# Patient Record
Sex: MALE | Race: WHITE | URBAN - NONMETROPOLITAN AREA
[De-identification: names, ages, dates, MRNs, and addresses within clinical notes are randomized per-mention and may not be internally consistent; named-entity substitution may affect disease eponyms.]

---

## 2017-01-05 ENCOUNTER — OFFICE VISIT (OUTPATIENT)
Dept: FAMILY MEDICINE CLINIC | Age: 37
End: 2017-01-05

## 2017-01-05 ENCOUNTER — TELEPHONE (OUTPATIENT)
Dept: FAMILY MEDICINE CLINIC | Age: 37
End: 2017-01-05

## 2017-01-05 VITALS
DIASTOLIC BLOOD PRESSURE: 84 MMHG | WEIGHT: 191.6 LBS | HEART RATE: 104 BPM | SYSTOLIC BLOOD PRESSURE: 136 MMHG | OXYGEN SATURATION: 97 % | HEIGHT: 68 IN | BODY MASS INDEX: 29.04 KG/M2

## 2017-01-05 DIAGNOSIS — Q07.00 ARNOLD-CHIARI MALFORMATION (HCC): ICD-10-CM

## 2017-01-05 DIAGNOSIS — Z00.00 WELL ADULT EXAM: Primary | ICD-10-CM

## 2017-01-05 DIAGNOSIS — M91.11: ICD-10-CM

## 2017-01-05 PROCEDURE — 99395 PREV VISIT EST AGE 18-39: CPT | Performed by: FAMILY MEDICINE

## 2017-01-05 ASSESSMENT — PATIENT HEALTH QUESTIONNAIRE - PHQ9
1. LITTLE INTEREST OR PLEASURE IN DOING THINGS: 0
2. FEELING DOWN, DEPRESSED OR HOPELESS: 0
SUM OF ALL RESPONSES TO PHQ9 QUESTIONS 1 & 2: 0
SUM OF ALL RESPONSES TO PHQ QUESTIONS 1-9: 0

## 2017-01-05 ASSESSMENT — ENCOUNTER SYMPTOMS
CONSTIPATION: 0
SHORTNESS OF BREATH: 0
DIARRHEA: 0

## 2017-01-20 ENCOUNTER — TELEPHONE (OUTPATIENT)
Dept: FAMILY MEDICINE CLINIC | Age: 37
End: 2017-01-20

## 2017-03-28 DIAGNOSIS — E03.9 UNSPECIFIED HYPOTHYROIDISM: ICD-10-CM

## 2017-03-28 RX ORDER — LEVOTHYROXINE SODIUM 0.07 MG/1
75 TABLET ORAL DAILY
Qty: 30 TABLET | Refills: 3 | Status: SHIPPED | OUTPATIENT
Start: 2017-03-28 | End: 2017-04-13 | Stop reason: DRUGHIGH

## 2017-03-31 RX ORDER — MELOXICAM 15 MG/1
15 TABLET ORAL DAILY
Qty: 30 TABLET | Refills: 3 | Status: SHIPPED | OUTPATIENT
Start: 2017-03-31 | End: 2017-05-24 | Stop reason: SINTOL

## 2017-04-13 ENCOUNTER — OFFICE VISIT (OUTPATIENT)
Dept: FAMILY MEDICINE CLINIC | Age: 37
End: 2017-04-13

## 2017-04-13 VITALS
SYSTOLIC BLOOD PRESSURE: 140 MMHG | HEIGHT: 68 IN | WEIGHT: 199.2 LBS | HEART RATE: 98 BPM | DIASTOLIC BLOOD PRESSURE: 86 MMHG | BODY MASS INDEX: 30.19 KG/M2 | OXYGEN SATURATION: 97 %

## 2017-04-13 DIAGNOSIS — Q07.00 ARNOLD-CHIARI MALFORMATION (HCC): ICD-10-CM

## 2017-04-13 DIAGNOSIS — M91.11: ICD-10-CM

## 2017-04-13 DIAGNOSIS — E03.9 UNSPECIFIED HYPOTHYROIDISM: Primary | ICD-10-CM

## 2017-04-13 PROCEDURE — 99213 OFFICE O/P EST LOW 20 MIN: CPT | Performed by: FAMILY MEDICINE

## 2017-04-13 RX ORDER — LEVOTHYROXINE SODIUM 0.1 MG/1
100 TABLET ORAL DAILY
Qty: 30 TABLET | Refills: 0 | Status: SHIPPED | OUTPATIENT
Start: 2017-04-13 | End: 2017-05-24 | Stop reason: ALTCHOICE

## 2017-04-13 RX ORDER — LEVOTHYROXINE SODIUM 112 UG/1
112 TABLET ORAL DAILY
Qty: 30 TABLET | Refills: 3 | Status: SHIPPED | OUTPATIENT
Start: 2017-04-13 | End: 2018-01-07 | Stop reason: SDUPTHER

## 2017-04-13 ASSESSMENT — ENCOUNTER SYMPTOMS: SHORTNESS OF BREATH: 0

## 2017-05-22 ENCOUNTER — TELEPHONE (OUTPATIENT)
Dept: FAMILY MEDICINE CLINIC | Age: 37
End: 2017-05-22

## 2017-05-24 ENCOUNTER — TELEPHONE (OUTPATIENT)
Dept: FAMILY MEDICINE CLINIC | Age: 37
End: 2017-05-24

## 2017-05-25 ENCOUNTER — OFFICE VISIT (OUTPATIENT)
Dept: FAMILY MEDICINE CLINIC | Age: 37
End: 2017-05-25

## 2017-05-25 VITALS
HEART RATE: 89 BPM | DIASTOLIC BLOOD PRESSURE: 84 MMHG | WEIGHT: 199 LBS | OXYGEN SATURATION: 98 % | BODY MASS INDEX: 30.26 KG/M2 | SYSTOLIC BLOOD PRESSURE: 130 MMHG

## 2017-05-25 DIAGNOSIS — K21.00 GASTROESOPHAGEAL REFLUX DISEASE WITH ESOPHAGITIS: Primary | ICD-10-CM

## 2017-05-25 DIAGNOSIS — K92.0 HEMATEMESIS, PRESENCE OF NAUSEA NOT SPECIFIED: ICD-10-CM

## 2017-05-25 DIAGNOSIS — R13.10 DYSPHAGIA, UNSPECIFIED TYPE: ICD-10-CM

## 2017-05-25 DIAGNOSIS — M91.11: ICD-10-CM

## 2017-05-25 PROCEDURE — 99214 OFFICE O/P EST MOD 30 MIN: CPT | Performed by: FAMILY MEDICINE

## 2017-05-25 RX ORDER — DEXLANSOPRAZOLE 60 MG/1
60 CAPSULE, DELAYED RELEASE ORAL DAILY
Qty: 20 CAPSULE | Refills: 0 | COMMUNITY
Start: 2017-05-25 | End: 2018-01-25 | Stop reason: ALTCHOICE

## 2017-05-25 ASSESSMENT — ENCOUNTER SYMPTOMS
NAUSEA: 1
HEARTBURN: 1
ABDOMINAL PAIN: 0
TROUBLE SWALLOWING: 1
GLOBUS SENSATION: 1
VOMITING: 1

## 2017-06-06 ENCOUNTER — TELEPHONE (OUTPATIENT)
Dept: FAMILY MEDICINE CLINIC | Age: 37
End: 2017-06-06

## 2017-06-07 RX ORDER — RANITIDINE 150 MG/1
150 TABLET ORAL 2 TIMES DAILY
Qty: 60 TABLET | Refills: 3 | Status: SHIPPED | OUTPATIENT
Start: 2017-06-07 | End: 2018-01-25 | Stop reason: ALTCHOICE

## 2017-06-12 ENCOUNTER — HOSPITAL ENCOUNTER (OUTPATIENT)
Dept: OTHER | Age: 37
Discharge: HOME | End: 2017-06-12
Attending: INTERNAL MEDICINE | Admitting: INTERNAL MEDICINE

## 2017-06-12 VITALS
HEIGHT: 68 IN | WEIGHT: 176 LBS | OXYGEN SATURATION: 98 % | RESPIRATION RATE: 16 BRPM | BODY MASS INDEX: 26.67 KG/M2 | DIASTOLIC BLOOD PRESSURE: 73 MMHG | TEMPERATURE: 97.9 F | SYSTOLIC BLOOD PRESSURE: 137 MMHG | HEART RATE: 73 BPM

## 2017-06-12 RX ORDER — SODIUM CHLORIDE, SODIUM LACTATE, POTASSIUM CHLORIDE, CALCIUM CHLORIDE 600; 310; 30; 20 MG/100ML; MG/100ML; MG/100ML; MG/100ML
INJECTION, SOLUTION INTRAVENOUS CONTINUOUS
Status: DISCONTINUED | OUTPATIENT
Start: 2017-06-12 | End: 2017-06-13 | Stop reason: HOSPADM

## 2017-06-12 RX ADMIN — SODIUM CHLORIDE, SODIUM LACTATE, POTASSIUM CHLORIDE, CALCIUM CHLORIDE: 600; 310; 30; 20 INJECTION, SOLUTION INTRAVENOUS at 12:54

## 2017-06-12 ASSESSMENT — PAIN - FUNCTIONAL ASSESSMENT: PAIN_FUNCTIONAL_ASSESSMENT: 0-10

## 2017-10-03 ENCOUNTER — TELEPHONE (OUTPATIENT)
Dept: FAMILY MEDICINE CLINIC | Age: 37
End: 2017-10-03

## 2017-10-03 NOTE — TELEPHONE ENCOUNTER
Pt calling requesting something to break up sinus congestion, he had dental work so its extra irritating to him, also wanting amoxicillin, uses wmwu, sxs x3 days

## 2017-10-04 RX ORDER — AMOXICILLIN 500 MG/1
500 CAPSULE ORAL 3 TIMES DAILY
Qty: 30 CAPSULE | Refills: 0 | Status: SHIPPED | OUTPATIENT
Start: 2017-10-04 | End: 2017-10-14

## 2018-01-07 DIAGNOSIS — E03.9 HYPOTHYROIDISM: ICD-10-CM

## 2018-01-08 RX ORDER — LEVOTHYROXINE SODIUM 112 UG/1
TABLET ORAL
Qty: 30 TABLET | Refills: 3 | Status: SHIPPED | OUTPATIENT
Start: 2018-01-08 | End: 2018-01-26 | Stop reason: DRUGHIGH

## 2018-01-09 DIAGNOSIS — K21.00 GASTROESOPHAGEAL REFLUX DISEASE WITH ESOPHAGITIS: ICD-10-CM

## 2018-01-09 RX ORDER — DEXLANSOPRAZOLE 60 MG/1
60 CAPSULE, DELAYED RELEASE ORAL DAILY
Qty: 20 CAPSULE | Refills: 0 | Status: CANCELLED | OUTPATIENT
Start: 2018-01-09

## 2018-01-09 RX ORDER — PANTOPRAZOLE SODIUM 40 MG/1
40 TABLET, DELAYED RELEASE ORAL 2 TIMES DAILY
Qty: 60 TABLET | Refills: 3 | Status: SHIPPED | OUTPATIENT
Start: 2018-01-09 | End: 2018-01-25 | Stop reason: ALTCHOICE

## 2018-01-18 ENCOUNTER — TELEPHONE (OUTPATIENT)
Dept: FAMILY MEDICINE CLINIC | Age: 38
End: 2018-01-18

## 2018-01-18 PROBLEM — E03.9 HYPOTHYROIDISM: Status: ACTIVE | Noted: 2018-01-18

## 2018-01-18 NOTE — TELEPHONE ENCOUNTER
Attempted to contact patient on 1/18/2018. Result: left message on the patient's voicemail asking patient to return my call. Pre-Visit planning not completed.        Cora Mcnally  Patient Services Specialist

## 2018-01-25 ENCOUNTER — OFFICE VISIT (OUTPATIENT)
Dept: FAMILY MEDICINE CLINIC | Age: 38
End: 2018-01-25

## 2018-01-25 VITALS
SYSTOLIC BLOOD PRESSURE: 136 MMHG | BODY MASS INDEX: 30.49 KG/M2 | HEIGHT: 68 IN | HEART RATE: 96 BPM | DIASTOLIC BLOOD PRESSURE: 88 MMHG | OXYGEN SATURATION: 97 % | WEIGHT: 201.2 LBS

## 2018-01-25 DIAGNOSIS — E03.9 HYPOTHYROIDISM, UNSPECIFIED TYPE: ICD-10-CM

## 2018-01-25 DIAGNOSIS — K21.00 GASTROESOPHAGEAL REFLUX DISEASE WITH ESOPHAGITIS: Primary | ICD-10-CM

## 2018-01-25 PROCEDURE — 36415 COLL VENOUS BLD VENIPUNCTURE: CPT | Performed by: FAMILY MEDICINE

## 2018-01-25 PROCEDURE — 99214 OFFICE O/P EST MOD 30 MIN: CPT | Performed by: FAMILY MEDICINE

## 2018-01-25 RX ORDER — DEXLANSOPRAZOLE 60 MG/1
60 CAPSULE, DELAYED RELEASE ORAL DAILY
Qty: 15 CAPSULE | Refills: 0 | COMMUNITY
Start: 2018-01-25 | End: 2018-02-06 | Stop reason: ALTCHOICE

## 2018-01-25 ASSESSMENT — ENCOUNTER SYMPTOMS
DIARRHEA: 0
COUGH: 0
CONSTIPATION: 0
NAUSEA: 1
HEARTBURN: 1
SORE THROAT: 1

## 2018-01-26 LAB
T4 FREE: 1.1 NG/DL (ref 0.9–1.8)
TSH SERPL DL<=0.05 MIU/L-ACNC: 7.54 UIU/ML (ref 0.27–4.2)

## 2018-01-26 RX ORDER — LEVOTHYROXINE SODIUM 0.12 MG/1
125 TABLET ORAL DAILY
Qty: 30 TABLET | Refills: 2 | Status: SHIPPED | OUTPATIENT
Start: 2018-01-26 | End: 2018-07-09 | Stop reason: SDUPTHER

## 2018-02-06 ENCOUNTER — TELEPHONE (OUTPATIENT)
Dept: FAMILY MEDICINE CLINIC | Age: 38
End: 2018-02-06

## 2018-02-06 RX ORDER — PANTOPRAZOLE SODIUM 40 MG/1
40 TABLET, DELAYED RELEASE ORAL DAILY
Qty: 30 TABLET | Refills: 3 | Status: SHIPPED | OUTPATIENT
Start: 2018-02-06 | End: 2019-01-25 | Stop reason: SDUPTHER

## 2018-02-06 NOTE — TELEPHONE ENCOUNTER
Since patient started the Solavei, he has had S/E of cough,congestion,fever ie: cold symptoms. Those are all Dexilant side effects. He is wanting to switch back to the Protonix and see if his sxs subside. He knows the Protonix wasn't working that well, but he doesn't want the S/E that he's having either b/c he has to work.     /MARCOS

## 2018-06-04 ENCOUNTER — TELEPHONE (OUTPATIENT)
Dept: FAMILY MEDICINE CLINIC | Age: 38
End: 2018-06-04

## 2018-06-04 RX ORDER — BENZONATATE 200 MG/1
200 CAPSULE ORAL 3 TIMES DAILY PRN
Qty: 30 CAPSULE | Refills: 0 | Status: SHIPPED | OUTPATIENT
Start: 2018-06-04 | End: 2019-01-25

## 2018-06-04 RX ORDER — AMOXICILLIN AND CLAVULANATE POTASSIUM 875; 125 MG/1; MG/1
1 TABLET, FILM COATED ORAL 2 TIMES DAILY
Qty: 20 TABLET | Refills: 0 | Status: SHIPPED | OUTPATIENT
Start: 2018-06-04 | End: 2018-06-06

## 2018-06-06 ENCOUNTER — TELEPHONE (OUTPATIENT)
Dept: FAMILY MEDICINE CLINIC | Age: 38
End: 2018-06-06

## 2018-06-06 RX ORDER — METHYLPREDNISOLONE 4 MG/1
TABLET ORAL
Qty: 1 KIT | Refills: 0 | Status: SHIPPED | OUTPATIENT
Start: 2018-06-06 | End: 2018-06-12

## 2018-07-09 RX ORDER — LEVOTHYROXINE SODIUM 0.12 MG/1
TABLET ORAL
Qty: 30 TABLET | Refills: 2 | Status: SHIPPED | OUTPATIENT
Start: 2018-07-09 | End: 2018-10-08 | Stop reason: SDUPTHER

## 2018-09-04 ENCOUNTER — TELEPHONE (OUTPATIENT)
Dept: FAMILY MEDICINE CLINIC | Age: 38
End: 2018-09-04

## 2018-09-04 RX ORDER — RANITIDINE 150 MG/1
150 TABLET ORAL 2 TIMES DAILY
Qty: 60 TABLET | Refills: 1 | Status: SHIPPED | OUTPATIENT
Start: 2018-09-04 | End: 2019-01-25 | Stop reason: ALTCHOICE

## 2018-10-08 RX ORDER — LEVOTHYROXINE SODIUM 0.12 MG/1
125 TABLET ORAL DAILY
Qty: 90 TABLET | Refills: 2 | Status: SHIPPED | OUTPATIENT
Start: 2018-10-08 | End: 2019-01-25 | Stop reason: SDUPTHER

## 2019-01-25 ENCOUNTER — OFFICE VISIT (OUTPATIENT)
Dept: FAMILY MEDICINE CLINIC | Age: 39
End: 2019-01-25
Payer: COMMERCIAL

## 2019-01-25 VITALS
SYSTOLIC BLOOD PRESSURE: 132 MMHG | OXYGEN SATURATION: 98 % | WEIGHT: 192.4 LBS | DIASTOLIC BLOOD PRESSURE: 86 MMHG | BODY MASS INDEX: 29.25 KG/M2 | HEART RATE: 103 BPM

## 2019-01-25 DIAGNOSIS — M91.11 JUVENILE OSTEOCHONDROSIS OF HEAD OF RIGHT FEMUR: ICD-10-CM

## 2019-01-25 DIAGNOSIS — E03.9 HYPOTHYROIDISM, UNSPECIFIED TYPE: ICD-10-CM

## 2019-01-25 DIAGNOSIS — M25.551 RIGHT HIP PAIN: Primary | ICD-10-CM

## 2019-01-25 DIAGNOSIS — K21.9 GASTROESOPHAGEAL REFLUX DISEASE WITHOUT ESOPHAGITIS: ICD-10-CM

## 2019-01-25 DIAGNOSIS — M25.551 PAIN OF RIGHT HIP JOINT: Primary | ICD-10-CM

## 2019-01-25 PROCEDURE — 99214 OFFICE O/P EST MOD 30 MIN: CPT | Performed by: NURSE PRACTITIONER

## 2019-01-25 RX ORDER — PANTOPRAZOLE SODIUM 40 MG/1
40 TABLET, DELAYED RELEASE ORAL DAILY
Qty: 30 TABLET | Refills: 3 | Status: SHIPPED | OUTPATIENT
Start: 2019-01-25

## 2019-01-25 RX ORDER — LEVOTHYROXINE SODIUM 0.12 MG/1
125 TABLET ORAL DAILY
Qty: 90 TABLET | Refills: 2 | Status: SHIPPED | OUTPATIENT
Start: 2019-01-25 | End: 2019-04-25

## 2019-01-25 ASSESSMENT — ENCOUNTER SYMPTOMS
STRIDOR: 0
CHOKING: 0
ABDOMINAL PAIN: 0
EYE REDNESS: 0
DIARRHEA: 0
WHEEZING: 0
EYE PAIN: 0
CHEST TIGHTNESS: 0
BACK PAIN: 0
VOMITING: 0
EYE DISCHARGE: 0
SORE THROAT: 0
EYE ITCHING: 0
SINUS PRESSURE: 0
NAUSEA: 0
RHINORRHEA: 0
COLOR CHANGE: 0
SHORTNESS OF BREATH: 0
VOICE CHANGE: 0
CONSTIPATION: 0
TROUBLE SWALLOWING: 0
PHOTOPHOBIA: 0
SINUS PAIN: 0
BLOOD IN STOOL: 0
COUGH: 0

## 2019-01-25 ASSESSMENT — PATIENT HEALTH QUESTIONNAIRE - PHQ9
1. LITTLE INTEREST OR PLEASURE IN DOING THINGS: 0
2. FEELING DOWN, DEPRESSED OR HOPELESS: 1
SUM OF ALL RESPONSES TO PHQ QUESTIONS 1-9: 1
SUM OF ALL RESPONSES TO PHQ QUESTIONS 1-9: 1
SUM OF ALL RESPONSES TO PHQ9 QUESTIONS 1 & 2: 1

## 2019-01-28 ENCOUNTER — TRANSCRIBE ORDERS (OUTPATIENT)
Dept: ADMINISTRATIVE | Facility: HOSPITAL | Age: 39
End: 2019-01-28

## 2019-01-28 ENCOUNTER — HOSPITAL ENCOUNTER (OUTPATIENT)
Dept: GENERAL RADIOLOGY | Facility: HOSPITAL | Age: 39
Discharge: HOME | End: 2019-01-28
Admitting: NURSE PRACTITIONER

## 2019-01-28 DIAGNOSIS — Z86.79 PERSONAL HISTORY OF CARDIOVASCULAR DISORDER: Primary | ICD-10-CM

## 2019-01-28 DIAGNOSIS — Z86.79 PERSONAL HISTORY OF CARDIOVASCULAR DISORDER: ICD-10-CM

## 2019-01-28 PROCEDURE — 71046 X-RAY EXAM CHEST 2 VIEWS: CPT

## 2019-01-28 PROCEDURE — 71046 X-RAY EXAM CHEST 2 VIEWS: CPT | Performed by: RADIOLOGY

## 2019-02-01 ENCOUNTER — OFFICE VISIT (OUTPATIENT)
Dept: CARDIOLOGY | Facility: CLINIC | Age: 39
End: 2019-02-01

## 2019-02-01 VITALS
DIASTOLIC BLOOD PRESSURE: 80 MMHG | SYSTOLIC BLOOD PRESSURE: 113 MMHG | HEART RATE: 87 BPM | BODY MASS INDEX: 29.25 KG/M2 | RESPIRATION RATE: 16 BRPM | HEIGHT: 68 IN | WEIGHT: 193 LBS

## 2019-02-01 DIAGNOSIS — Z01.810 PREOPERATIVE CARDIOVASCULAR EXAMINATION: Primary | ICD-10-CM

## 2019-02-01 PROCEDURE — 99204 OFFICE O/P NEW MOD 45 MIN: CPT | Performed by: INTERNAL MEDICINE

## 2019-02-01 PROCEDURE — 93000 ELECTROCARDIOGRAM COMPLETE: CPT | Performed by: INTERNAL MEDICINE

## 2019-02-01 RX ORDER — IBUPROFEN 600 MG/1
600 TABLET ORAL EVERY 6 HOURS PRN
COMMUNITY
End: 2020-08-03

## 2019-02-01 RX ORDER — CETIRIZINE HYDROCHLORIDE 10 MG/1
10 TABLET ORAL DAILY
COMMUNITY
End: 2020-06-25 | Stop reason: ALTCHOICE

## 2019-02-01 RX ORDER — PANTOPRAZOLE SODIUM 20 MG/1
40 TABLET, DELAYED RELEASE ORAL DAILY
COMMUNITY
End: 2020-09-04 | Stop reason: SDUPTHER

## 2019-02-01 RX ORDER — LEVOTHYROXINE SODIUM 0.12 MG/1
125 TABLET ORAL DAILY
COMMUNITY
End: 2020-09-04 | Stop reason: SDUPTHER

## 2019-02-01 NOTE — PROGRESS NOTES
Jailyn Conroy, EMMETT  Nick Christian  1980 02/01/2019    Patient Active Problem List   Diagnosis   • Preoperative cardiovascular examination       Dear Jailyn Conroy, EMMETT:    Siomara Christian is a 38 y.o. male with the problems as listed above, presents    Chief complaint: Preoperative cardiovascular evaluation.    History of Present Illness: Mr. Crockett is a pleasant 38-year-old  male with known history of known coronary artery disease but has a remote history of heart failure in 2008 after viral illness.  At that time apparently he developed some cardiomyopathy which reportedly has improved and resolved over a period of time.  He has not had any more episodes of heart failure in the last few years.  He is now referred for preoperative cardiac evaluation and cardiac clearance prior to undergoing hip surgery.  He has history of Perthes disease.  He denies any recent history of chest pain or discomfort or shortness of breath, PND or orthopnea.  He denies any palpitations, dizziness or syncope.    Allergies   Allergen Reactions   • Morphine Mental Status Change   :      Current Outpatient Medications:   •  cetirizine (zyrTEC) 10 MG tablet, Take 10 mg by mouth Daily., Disp: , Rfl:   •  ibuprofen (ADVIL,MOTRIN) 600 MG tablet, Take 600 mg by mouth Every 6 (Six) Hours As Needed for Mild Pain ., Disp: , Rfl:   •  levothyroxine (SYNTHROID, LEVOTHROID) 125 MCG tablet, Take 125 mcg by mouth Daily., Disp: , Rfl:   •  pantoprazole (PROTONIX) 20 MG EC tablet, Take 40 mg by mouth Daily., Disp: , Rfl:     Past Medical History:   Diagnosis Date   • Arnold-Chiari deformity (CMS/HCC)    • Coronary artery disease    • Perthes disease    • Sleep apnea    • Thyroid disease      Past Surgical History:   Procedure Laterality Date   • GALLBLADDER SURGERY     • HERNIA REPAIR       Family History   Problem Relation Age of Onset   • Heart disease Maternal Grandfather    • Heart disease Paternal Grandfather   "    Social History     Tobacco Use   • Smoking status: Former Smoker     Packs/day: 1.00     Types: Cigarettes     Last attempt to quit: 2015     Years since quittin.0   • Smokeless tobacco: Former User     Types: Chew   Substance Use Topics   • Alcohol use: Yes     Comment: occas   • Drug use: No       Review of Systems   Constitution: Negative for chills, diaphoresis and fever.   Cardiovascular: Negative for chest pain, leg swelling, orthopnea, palpitations and paroxysmal nocturnal dyspnea.   Respiratory: Negative for cough, hemoptysis and shortness of breath.    Endocrine: Negative for cold intolerance and heat intolerance.   Hematologic/Lymphatic: Does not bruise/bleed easily.   Skin: Positive for dry skin and rash.   Musculoskeletal: Positive for back pain, joint pain, joint swelling, muscle cramps, muscle weakness and stiffness. Negative for myalgias.   Gastrointestinal: Negative for abdominal pain, constipation, diarrhea, nausea and vomiting.   Genitourinary: Negative for dysuria and hematuria.   Neurological: Positive for headaches, numbness and paresthesias. Negative for dizziness and focal weakness.        Stroke   Psychiatric/Behavioral: Positive for memory loss.       Objective   Blood pressure 113/80, pulse 87, resp. rate 16, height 172.7 cm (68\"), weight 87.5 kg (193 lb).  Body mass index is 29.35 kg/m².        Physical Exam   Constitutional: He is oriented to person, place, and time. He appears well-developed and well-nourished.   HENT:   Mouth/Throat: Oropharynx is clear and moist.   Eyes: EOM are normal. Pupils are equal, round, and reactive to light.   Neck: Neck supple. No JVD present. No tracheal deviation present. No thyromegaly present.   Cardiovascular: Normal rate, regular rhythm, S1 normal and S2 normal. Exam reveals no gallop and no friction rub.   No murmur heard.  Pulmonary/Chest: Effort normal and breath sounds normal.   Abdominal: Soft. Bowel sounds are normal. He exhibits no mass. " There is no tenderness.   Musculoskeletal: Normal range of motion. He exhibits no edema.   Has painful right hip movements.   Lymphadenopathy:     He has no cervical adenopathy.   Neurological: He is alert and oriented to person, place, and time.   Skin: Skin is warm and dry. No rash noted.   Psychiatric: He has a normal mood and affect.         ECG 12 Lead  Date/Time: 2/1/2019 12:13 PM  Performed by: Darrin Reyes MD  Authorized by: Darrin Reyes MD   Rhythm: sinus rhythm  BPM: 93  Conduction: conduction normal  ST Segments: ST segments normal  Other: no other findings  Clinical impression: normal ECG              Assessment/Plan :  1. Preoperative cardiovascular examination          Recommendations:  Since his cardiac status appears to be stable at this time, we'll go ahead and clear him for the hip surgery with an acceptable cardiac risk.    No Follow-up on file.    As always, Jailyn Conroy APRN  I appreciate very much the opportunity to participate in the cardiovascular care of your patients. Please do not hesitate to call me with any questions with regards to Nick Grooms evaluation and management.       With Best Regards,        Darrin Reyes MD, [facility]    Dragon disclaimer:  Much of this encounter note is an electronic transcription/translation of spoken language to printed text. The electronic translation of spoken language may permit erroneous, or at times, nonsensical words or phrases to be inadvertently transcribed; Although I have reviewed the note for such errors, some may still exist.

## 2019-02-13 ENCOUNTER — OFFICE VISIT (OUTPATIENT)
Dept: ORTHOPEDIC SURGERY | Facility: CLINIC | Age: 39
End: 2019-02-13

## 2019-02-13 VITALS
HEIGHT: 68 IN | WEIGHT: 191 LBS | HEART RATE: 91 BPM | DIASTOLIC BLOOD PRESSURE: 77 MMHG | SYSTOLIC BLOOD PRESSURE: 124 MMHG | BODY MASS INDEX: 28.95 KG/M2

## 2019-02-13 DIAGNOSIS — M91.11 LEGG-PERTHES DISEASE, RIGHT: Primary | ICD-10-CM

## 2019-02-13 DIAGNOSIS — M70.61 TROCHANTERIC BURSITIS OF RIGHT HIP: ICD-10-CM

## 2019-02-13 PROCEDURE — 20610 DRAIN/INJ JOINT/BURSA W/O US: CPT | Performed by: ORTHOPAEDIC SURGERY

## 2019-02-13 PROCEDURE — 99203 OFFICE O/P NEW LOW 30 MIN: CPT | Performed by: ORTHOPAEDIC SURGERY

## 2019-02-13 RX ADMIN — LIDOCAINE HYDROCHLORIDE 2 ML: 20 INJECTION, SOLUTION INFILTRATION; PERINEURAL at 12:00

## 2019-02-13 RX ADMIN — METHYLPREDNISOLONE ACETATE 40 MG: 40 INJECTION, SUSPENSION INTRA-ARTICULAR; INTRALESIONAL; INTRAMUSCULAR; SOFT TISSUE at 12:00

## 2019-02-13 NOTE — PROGRESS NOTES
New Patient Visit      Patient: Nick Christian  YOB: 1980  Date of Encounter: 02/13/2019        Chief Complaint:   Chief Complaint   Patient presents with   • Right Hip - Pain       HPI:   Nick Christian, 38 y.o. male, referred by Jailyn Conroy APRN presents today for evaluation of incapacitating right hip pain.  He has history of Perthes disease that he was not aware of untill his mid 20s.  He did wear a built-up shoe but discontinued this because it was too high.  He moved to [place] and he was employed at the [employer].  After working 3 months he developed significant limp and has progressed to the point that he is unable to sit or lie without significant pain.  As a [employer] employee, he was initially referred to [facility].  He was unable to make the trip for whatever reason and then his insurance was terminated.  He then visited a surgeon in [facility] and was scheduled for right total hip replacement.  He reports that he did not make that trip because he cannot stand to ride in a car that long.  He denies significant back pain, denies weakness or numbness in his leg.  He localizes pain to the posterior and lateral aspects of his hip and acknowledges very little pain in his groin.    Active Problem List:  Patient Active Problem List   Diagnosis   • Arnold-Chiari malformation (CMS/HCC)   • Hypothyroidism   • Juvenile osteochondrosis of head of femur   • Legg-Perthes disease, right       Past Medical History:  Past Medical History:   Diagnosis Date   • Arnold-Chiari deformity (CMS/HCC)    • Coronary artery disease    • Perthes disease    • Sleep apnea    • Thyroid disease        Past Surgical History:  Past Surgical History:   Procedure Laterality Date   • GALLBLADDER SURGERY     • HERNIA REPAIR         Family History:  Family History   Problem Relation Age of Onset   • Heart disease Maternal Grandfather    • Diabetes Maternal Grandfather    • Heart disease Paternal  Grandfather    • Rheumatologic disease Mother    • Diabetes Mother    • Hypertension Mother    • Diabetes Father    • Cancer Father    • Hypertension Maternal Grandmother        Social History:  Social History     Socioeconomic History   • Marital status: Single     Spouse name: Not on file   • Number of children: Not on file   • Years of education: Not on file   • Highest education level: Not on file   Social Needs   • Financial resource strain: Not on file   • Food insecurity - worry: Not on file   • Food insecurity - inability: Not on file   • Transportation needs - medical: Not on file   • Transportation needs - non-medical: Not on file   Occupational History   • Not on file   Tobacco Use   • Smoking status: Former Smoker     Packs/day: 1.00     Types: Cigarettes     Last attempt to quit: 2015     Years since quittin.1   • Smokeless tobacco: Former User     Types: Chew   Substance and Sexual Activity   • Alcohol use: Yes     Comment: occas   • Drug use: No   • Sexual activity: Defer   Other Topics Concern   • Not on file   Social History Narrative   • Not on file     Patient's Body mass index is 29.04 kg/m². BMI is above normal parameters. Recommendations include: educational material.      Medications:  Current Outpatient Medications   Medication Sig Dispense Refill   • cetirizine (zyrTEC) 10 MG tablet Take 10 mg by mouth Daily.     • ibuprofen (ADVIL,MOTRIN) 600 MG tablet Take 600 mg by mouth Every 6 (Six) Hours As Needed for Mild Pain .     • levothyroxine (SYNTHROID, LEVOTHROID) 125 MCG tablet Take 125 mcg by mouth Daily.     • pantoprazole (PROTONIX) 20 MG EC tablet Take 40 mg by mouth Daily.       No current facility-administered medications for this visit.        Allergies:  Allergies   Allergen Reactions   • Morphine Mental Status Change       Review of Systems:   Review of Systems   Constitutional: Positive for activity change and fatigue.   HENT: Positive for hearing loss and sinus pain.    Eyes:  "Negative.    Respiratory: Positive for apnea.    Cardiovascular: Negative.    Gastrointestinal: Negative.    Endocrine: Negative.    Genitourinary: Negative.    Musculoskeletal: Positive for arthralgias, back pain, joint swelling and myalgias.   Skin: Negative.    Allergic/Immunologic: Negative.    Neurological: Positive for numbness.   Hematological: Negative.    Psychiatric/Behavioral: Negative.        Physical Exam:   Physical Exam  GENERAL: 38 y.o. male, alert and oriented X 3 in no acute distress.   Visit Vitals  /77   Pulse 91   Ht 172.7 cm (68\")   Wt 86.6 kg (191 lb)   BMI 29.04 kg/m²     Musculoskeletal:   Right hip evaluation is very difficult due to disease. He sits with his hip flexed and lies on his left side, complains of significant pain with attempted motion.  Upon palpation he has exquisite tenderness over the greater trochanteric bursal region.  Neurovascular is intact to his right leg.     Radiology/Labs:     Radiographs reviewed of right hip show typical flattening of the femoral head with Perthes disease.  No other abnormality noted.  He also had available x-rays of his lumbar spine and those were reviewed and negative.    Assessment & Plan:   38 y.o. male presents with nearly incapacitating pain right hip that he localizes posterior and lateral right hip.  His presentation is somewhat consistent with bursitis.  Today he was given Depo-Medrol 40 mg lidocaine block directly over the right greater trochanteric bursa he reported very little relief.  With further questioning he states that his surgeon in [place] also treated him with steroid injection which did not help.  He is very interested in having his right hip replaced was initially referred to [facility] orthopedic [facility].  He reports there was a financial situation he was unable to attend but he now has Medicaid.  He was offered referral to the [facility].  He states that he would prefer to return to the [facility].  He is referred to Dr. Vásquez' for possible right hip replacement with Perthes disease.  We will follow-up here as needed.      ICD-10-CM ICD-9-CM   1. Legg-Perthes disease, right M91.11 732.1     Large Joint Arthrocentesis: R greater trochanteric bursa  Date/Time: 2/13/2019 12:00 PM  Consent given by: patient  Site marked: site marked  Supporting Documentation  Indications: pain   Procedure Details  Location: hip - R greater trochanteric bursa  Preparation: Patient was prepped and draped in the usual sterile fashion  Needle size: 25 G  Approach: lateral  Medications administered: 40 mg methylPREDNISolone acetate 40 MG/ML; 2 mL lidocaine 2%  Patient tolerance: patient tolerated the procedure well with no immediate complications                  Cc:   Jailyn Conroy APRN          Scribed for Rahat Salazar MD by Hazel Salazar RN.9:27 AM 02/13/2019

## 2019-02-15 PROBLEM — Z01.810 PREOPERATIVE CARDIOVASCULAR EXAMINATION: Status: RESOLVED | Noted: 2019-02-01 | Resolved: 2019-02-15

## 2019-02-15 PROBLEM — E03.9 HYPOTHYROIDISM: Status: ACTIVE | Noted: 2018-01-18

## 2019-02-15 PROBLEM — Q07.00 ARNOLD-CHIARI MALFORMATION: Status: ACTIVE | Noted: 2019-02-15

## 2019-02-15 PROBLEM — M91.10 JUVENILE OSTEOCHONDROSIS OF HEAD OF FEMUR: Status: ACTIVE | Noted: 2019-02-15

## 2019-02-19 ENCOUNTER — TELEPHONE (OUTPATIENT)
Dept: ORTHOPEDIC SURGERY | Facility: CLINIC | Age: 39
End: 2019-02-19

## 2019-02-19 NOTE — TELEPHONE ENCOUNTER
Patient was notified concerning referral he requested to Dr. Vásquez in [place], he states that he has already been taken care of and scheduled for surgery @ [facility] in [place].   Dr. Vásquez's office called and appointment cancelled 2-19-19.

## 2019-02-20 RX ORDER — METHYLPREDNISOLONE ACETATE 40 MG/ML
40 INJECTION, SUSPENSION INTRA-ARTICULAR; INTRALESIONAL; INTRAMUSCULAR; SOFT TISSUE
Status: COMPLETED | OUTPATIENT
Start: 2019-02-13 | End: 2019-02-13

## 2019-02-20 RX ORDER — LIDOCAINE HYDROCHLORIDE 20 MG/ML
2 INJECTION, SOLUTION INFILTRATION; PERINEURAL
Status: COMPLETED | OUTPATIENT
Start: 2019-02-13 | End: 2019-02-13

## 2020-04-07 ENCOUNTER — TRANSCRIBE ORDERS (OUTPATIENT)
Dept: ADMINISTRATIVE | Facility: HOSPITAL | Age: 40
End: 2020-04-07

## 2020-04-07 DIAGNOSIS — M25.551 PAIN OF RIGHT HIP JOINT: Primary | ICD-10-CM

## 2020-05-07 ENCOUNTER — HOSPITAL ENCOUNTER (OUTPATIENT)
Dept: NUCLEAR MEDICINE | Facility: HOSPITAL | Age: 40
Discharge: HOME | End: 2020-05-07

## 2020-05-07 DIAGNOSIS — M25.551 PAIN OF RIGHT HIP JOINT: ICD-10-CM

## 2020-05-07 PROCEDURE — 0 TECHNETIUM OXIDRONATE KIT: Performed by: ORTHOPAEDIC SURGERY

## 2020-05-07 PROCEDURE — A9561 TC99M OXIDRONATE: HCPCS | Performed by: ORTHOPAEDIC SURGERY

## 2020-05-07 PROCEDURE — 78315 BONE IMAGING 3 PHASE: CPT | Performed by: RADIOLOGY

## 2020-05-07 PROCEDURE — 78315 BONE IMAGING 3 PHASE: CPT

## 2020-05-07 RX ADMIN — TECHNETIUM TC 99M OXIDRONATE 1 DOSE: 3.15 INJECTION, POWDER, LYOPHILIZED, FOR SOLUTION INTRAVENOUS at 08:53

## 2020-06-22 ENCOUNTER — TRANSCRIBE ORDERS (OUTPATIENT)
Dept: ADMINISTRATIVE | Facility: HOSPITAL | Age: 40
End: 2020-06-22

## 2020-06-22 DIAGNOSIS — Q07.00 ARNOLD-CHIARI SYNDROME (HCC): Primary | ICD-10-CM

## 2020-06-24 ENCOUNTER — HOSPITAL ENCOUNTER (OUTPATIENT)
Dept: MRI IMAGING | Facility: HOSPITAL | Age: 40
Discharge: HOME | End: 2020-06-24
Admitting: FAMILY MEDICINE

## 2020-06-24 DIAGNOSIS — Q07.00 ARNOLD-CHIARI SYNDROME (HCC): ICD-10-CM

## 2020-06-24 PROCEDURE — 70551 MRI BRAIN STEM W/O DYE: CPT

## 2020-06-24 PROCEDURE — 70551 MRI BRAIN STEM W/O DYE: CPT | Performed by: RADIOLOGY

## 2020-06-25 ENCOUNTER — OFFICE VISIT (OUTPATIENT)
Dept: CARDIOLOGY | Facility: CLINIC | Age: 40
End: 2020-06-25

## 2020-06-25 VITALS
HEART RATE: 103 BPM | WEIGHT: 202.8 LBS | HEIGHT: 69 IN | SYSTOLIC BLOOD PRESSURE: 124 MMHG | DIASTOLIC BLOOD PRESSURE: 80 MMHG | BODY MASS INDEX: 30.04 KG/M2 | OXYGEN SATURATION: 98 % | TEMPERATURE: 99 F

## 2020-06-25 DIAGNOSIS — R06.09 DYSPNEA ON EXERTION: Primary | ICD-10-CM

## 2020-06-25 PROCEDURE — 93000 ELECTROCARDIOGRAM COMPLETE: CPT | Performed by: INTERNAL MEDICINE

## 2020-06-25 PROCEDURE — 99213 OFFICE O/P EST LOW 20 MIN: CPT | Performed by: INTERNAL MEDICINE

## 2020-06-25 RX ORDER — FENOFIBRATE 145 MG/1
145 TABLET, COATED ORAL DAILY
COMMUNITY
End: 2020-08-20

## 2020-06-25 NOTE — PROGRESS NOTES
Nita Goss DO  Nick Christian  1980 06/25/2020    Patient Active Problem List   Diagnosis   • Arnold-Chiari malformation (CMS/HCC)   • Hypothyroidism   • Juvenile osteochondrosis of head of femur   • Legg-Perthes disease, right       Dear Nita Goss DO:    Siomara Christian is a 40 y.o. male with the problems as listed above, presents    Chief Complaint: Shortness of breath for the last several months     History of Present Illness:  is a pleasant 40-year-old  male with a no history of known heart disease or coronary artery disease, nonhypertensive, nondiabetic, non-smoker, presents with complaints of mainly having shortness of breath over the last several months.  He says he gets short of breath sometimes just sitting down and sometimes with mild exertion.  He denies any PND, orthopnea pedal edema.  Denies any chest pains.  Denies any history of lung problems.  He also states that he was recently diagnosed to have hypertriglyceridemia.  I do not have the pertinent lipid panel report.      Allergies   Allergen Reactions   • Morphine Mental Status Change   :      Current Outpatient Medications:   •  fenofibrate (TRICOR) 145 MG tablet, Take 145 mg by mouth Daily., Disp: , Rfl:   •  ibuprofen (ADVIL,MOTRIN) 600 MG tablet, Take 600 mg by mouth Every 6 (Six) Hours As Needed for Mild Pain ., Disp: , Rfl:   •  levothyroxine (SYNTHROID, LEVOTHROID) 125 MCG tablet, Take 125 mcg by mouth Daily., Disp: , Rfl:   •  pantoprazole (PROTONIX) 20 MG EC tablet, Take 40 mg by mouth Daily., Disp: , Rfl:       The following portions of the patient's history were reviewed and updated as appropriate: allergies, current medications, past family history, past medical history, past social history, past surgical history and problem list.    Social History     Tobacco Use   • Smoking status: Former Smoker     Packs/day: 1.00     Types: Cigarettes     Last attempt to quit: 2015     Years since  "quittin.4   • Smokeless tobacco: Former User     Types: Chew   Substance Use Topics   • Alcohol use: Yes     Comment: occas   • Drug use: No       Review of Systems   Cardiovascular: Negative for chest pain, leg swelling and syncope.   Respiratory: Negative for shortness of breath.    Neurological: Negative for dizziness.       Objective   Vitals:    20 1305   BP: 124/80   BP Location: Right arm   Patient Position: Sitting   Cuff Size: Adult   Pulse: 103   Temp: 99 °F (37.2 °C)   TempSrc: Infrared   SpO2: 98%   Weight: 92 kg (202 lb 12.8 oz)   Height: 175.3 cm (69\")     Body mass index is 29.95 kg/m².        Physical Exam   Constitutional: He is oriented to person, place, and time. He appears well-developed and well-nourished.   HENT:   Mouth/Throat: Oropharynx is clear and moist.   Eyes: Pupils are equal, round, and reactive to light. EOM are normal.   Neck: Neck supple. No JVD present. No tracheal deviation present. No thyromegaly present.   Cardiovascular: Normal rate, regular rhythm, S1 normal and S2 normal. Exam reveals no gallop and no friction rub.   No murmur heard.  Pulmonary/Chest: Effort normal and breath sounds normal.   Abdominal: Soft. Bowel sounds are normal. He exhibits no mass. There is no tenderness.   Musculoskeletal: Normal range of motion. He exhibits no edema.   Lymphadenopathy:     He has no cervical adenopathy.   Neurological: He is alert and oriented to person, place, and time.   Skin: Skin is warm and dry. No rash noted.   Psychiatric: He has a normal mood and affect.         ECG 12 Lead  Date/Time: 2020 12:58 PM  Performed by: Darrin Reyes MD  Authorized by: Darrin Reyes MD   Comparison: compared with previous ECG from 2019  Similar to previous ECG  Rhythm: sinus rhythm  BPM: 90  Conduction: conduction normal  ST Segments: ST segments normal  T Waves: T waves normal    Clinical impression: normal ECG              Assessment/Plan :   Diagnosis Plan    Dyspnea on " exertion (NYHA class III) Adult Transthoracic Echo Complete       Recommendations:  We will obtain echo Doppler study to evaluate his LV function valve function and tailor further therapy accordingly.  We will get his recent lipid panel report from his PCPs office.    Return in about 5 weeks (around 7/30/2020).    As always, Dr. Goss  I appreciate very much the opportunity to participate in the cardiovascular care of your patients. Please do not hesitate to call me with any questions with regards to Nick Christian's evaluation and management.     With Best Regards,        Darrin Reyes MD, FACC    Please note that portions of this note were completed with a voice recognition program.

## 2020-06-26 ENCOUNTER — TELEPHONE (OUTPATIENT)
Dept: CARDIOLOGY | Facility: CLINIC | Age: 40
End: 2020-06-26

## 2020-07-07 ENCOUNTER — APPOINTMENT (OUTPATIENT)
Dept: CARDIOLOGY | Facility: HOSPITAL | Age: 40
End: 2020-07-07

## 2020-07-10 ENCOUNTER — TELEPHONE (OUTPATIENT)
Dept: FAMILY MEDICINE CLINIC | Age: 40
End: 2020-07-10

## 2020-07-15 ENCOUNTER — TRANSCRIBE ORDERS (OUTPATIENT)
Dept: ADMINISTRATIVE | Facility: HOSPITAL | Age: 40
End: 2020-07-15

## 2020-07-15 DIAGNOSIS — M25.551 RIGHT HIP PAIN: Primary | ICD-10-CM

## 2020-07-17 ENCOUNTER — HOSPITAL ENCOUNTER (OUTPATIENT)
Dept: CT IMAGING | Facility: HOSPITAL | Age: 40
Discharge: HOME | End: 2020-07-17
Admitting: ORTHOPAEDIC SURGERY

## 2020-07-17 DIAGNOSIS — M25.551 RIGHT HIP PAIN: ICD-10-CM

## 2020-07-17 PROCEDURE — 73700 CT LOWER EXTREMITY W/O DYE: CPT | Performed by: RADIOLOGY

## 2020-07-17 PROCEDURE — 73700 CT LOWER EXTREMITY W/O DYE: CPT

## 2020-08-03 ENCOUNTER — OFFICE VISIT (OUTPATIENT)
Dept: NEUROSURGERY | Facility: CLINIC | Age: 40
End: 2020-08-03

## 2020-08-03 VITALS
BODY MASS INDEX: 31.22 KG/M2 | TEMPERATURE: 98.9 F | DIASTOLIC BLOOD PRESSURE: 80 MMHG | SYSTOLIC BLOOD PRESSURE: 112 MMHG | WEIGHT: 206 LBS | HEIGHT: 68 IN

## 2020-08-03 DIAGNOSIS — G44.89 OTHER HEADACHE SYNDROME: ICD-10-CM

## 2020-08-03 DIAGNOSIS — Q07.00 ARNOLD-CHIARI MALFORMATION (HCC): Primary | ICD-10-CM

## 2020-08-03 PROCEDURE — 99203 OFFICE O/P NEW LOW 30 MIN: CPT | Performed by: NEUROLOGICAL SURGERY

## 2020-08-03 NOTE — PROGRESS NOTES
Nick Christian  1980  [id number]      Chief Complaint   Patient presents with   • Dizziness   • Blurred Vision   • Headache       HISTORY OF PRESENT ILLNESS: This a 40-year-old male has a long history of intermittent headache which are located primarily suboccipital rating to the vertex and bitemporal.  These are occasionally associated with blurred vision although he may have blurred vision without headaches.  In addition he has issues with hearing loss which also seemed to be unrelated to headache.  He has no issues with balance ataxia or swallowing.  He has had his symptoms for some time which have gotten progressively worse although they tend to fluctuate.  He has been known to have Arnold-Chiari and mild ventriculomegaly for years.  He has been to the [facility] and has been followed elsewhere.  He is now relocated from [place] to [place].  Cervical MRI was performed and is referred for neurosurgical consultation.    Past Medical History:   Diagnosis Date   • Arnold-Chiari deformity (CMS/HCC)    • Coronary artery disease    • Perthes disease    • Sleep apnea    • Thyroid disease        Past Surgical History:   Procedure Laterality Date   • GALLBLADDER SURGERY     • HERNIA REPAIR     • HIP BIPOLAR REPLACEMENT         Family History   Problem Relation Age of Onset   • Heart disease Maternal Grandfather    • Diabetes Maternal Grandfather    • Heart disease Paternal Grandfather    • Rheumatologic disease Mother    • Diabetes Mother    • Hypertension Mother    • Diabetes Father    • Cancer Father    • Hypertension Maternal Grandmother        Social History     Socioeconomic History   • Marital status:      Spouse name: Not on file   • Number of children: Not on file   • Years of education: Not on file   • Highest education level: Not on file   Tobacco Use   • Smoking status: Former Smoker     Packs/day: 1.00     Types: Cigarettes     Last attempt to quit: 2015     Years since quittin.5   •  Smokeless tobacco: Former User     Types: Chew   Substance and Sexual Activity   • Alcohol use: Yes     Comment: occas   • Drug use: No   • Sexual activity: Defer       Allergies   Allergen Reactions   • Morphine Mental Status Change         Current Outpatient Medications:   •  fenofibrate (TRICOR) 145 MG tablet, Take 145 mg by mouth Daily., Disp: , Rfl:   •  levothyroxine (SYNTHROID, LEVOTHROID) 125 MCG tablet, Take 125 mcg by mouth Daily., Disp: , Rfl:   •  pantoprazole (PROTONIX) 20 MG EC tablet, Take 40 mg by mouth Daily., Disp: , Rfl:     Review of Systems   Constitutional: Positive for appetite change, fatigue and unexpected weight change. Negative for chills, diaphoresis and fever.   HENT: Positive for hearing loss, sinus pressure and trouble swallowing. Negative for congestion, dental problem, drooling, ear discharge, ear pain, facial swelling, mouth sores, nosebleeds, postnasal drip, rhinorrhea, sneezing, sore throat, tinnitus and voice change.    Eyes: Positive for visual disturbance. Negative for photophobia, pain, discharge, redness and itching.   Respiratory: Positive for cough, choking, chest tightness, shortness of breath and wheezing. Negative for apnea and stridor.    Cardiovascular: Positive for chest pain. Negative for palpitations and leg swelling.   Gastrointestinal: Negative for abdominal distention, abdominal pain, anal bleeding, blood in stool, constipation, diarrhea, nausea, rectal pain and vomiting.   Endocrine: Negative for cold intolerance, heat intolerance, polydipsia, polyphagia and polyuria.   Genitourinary: Negative for decreased urine volume, difficulty urinating, dysuria, enuresis, flank pain, frequency, genital sores, hematuria and urgency.   Musculoskeletal: Positive for arthralgias and back pain. Negative for gait problem, joint swelling, myalgias, neck pain and neck stiffness.   Skin: Negative for color change, pallor, rash and wound.   Allergic/Immunologic: Positive for  "environmental allergies. Negative for food allergies and immunocompromised state.   Neurological: Positive for dizziness, speech difficulty, light-headedness, numbness and headaches. Negative for tremors, seizures, syncope, facial asymmetry and weakness.   Hematological: Negative for adenopathy. Does not bruise/bleed easily.   Psychiatric/Behavioral: Positive for confusion and sleep disturbance. Negative for agitation, behavioral problems, decreased concentration, dysphoric mood, hallucinations, self-injury and suicidal ideas. The patient is not nervous/anxious and is not hyperactive.    All other systems reviewed and are negative.      Vitals:    08/03/20 1541   BP: 112/80   BP Location: Right arm   Patient Position: Sitting   Temp: 98.9 °F (37.2 °C)   TempSrc: Infrared   Weight: 93.4 kg (206 lb)   Height: 172.7 cm (68\")       Neurological Examination:  Mental status/speech: The patient is alert and oriented.  Speech is clear without aphysia or dysarthria.  No overt cognitive deficits.    Cranial nerve examination:    Olfaction: Smell is intact.  Vision: Vision is intact without visual field abnormalities.  Funduscopic examination is normal.  No pupillary irregularity.  Ocular motor examination: The extraocular muscles are intact.  There is no diplopia.  The pupil is round and reactive to both light and accommodation.  There is no nystagmus.  Facial movement/sensation: There is no facial weakness.  Sensation is intact in the first, second, and third divisions of the trigeminal nerve.  The corneal reflex is intact.  Auditory: Hearing is intact to finger rub bilaterally.  Cranial nerves IX, X, XI, XII: Phonation is normal.  No dysphagia.  Tongue is protruded in the midline without atrophy.  The gag reflex is intact.  Shoulder shrug is normal.    Musculoligamentous ligamentous examination: BMI 31.3 weight 206 pounds.  He has full and active range of motion of the cervical and lumbar spine.  His strength is excellent " "without weakness sensory loss or reflex asymmetry.  There is no Babinski Amaya or clonus.  His gait is normal.    Medical Decision Making:     Diagnostic Data Set: MRI shows a type I Arnold-Chiari malformation with mild ventricular enlargement.  The fourth ventricle appears to be intact.  There is no significant crowding of the foramen of Flannery.  There is no evidence of a syrinx.  I have compared his recent studies to the previous studies he is had over the past several years it is been no significant change.      Assessment: Arnold-Chiari malformation          Recommendations: I am not convinced that the Arnold-Chiari malformation is the etiology of his symptoms thus it would be very reluctant to recommend decompression as a remedy.  I have asked him to see neurology for their input.  He has headaches which may well be muscle contraction or \"common\" migraine.  He has no signs or symptoms of increased cranial pressure.  The ventricular system has been stable.  At present time I am recommended that he be continued followed and no surgery.  I will continue to do so and keep you informed.        I greatly appreciate the opportunity to see and evaluate this individual.  If you have questions or concerns regarding issues that I may have overlooked please call me at any time: [phone].  Santos Neal M.D.  [facility]  [address]  [facility]  [id number]      "

## 2020-08-03 NOTE — PATIENT INSTRUCTIONS
Call Dr. Neal on a Monday or Tuesday (ask for Portia or Carmela) and leave a message.     Dr. Neal will call you back at the end of the day as soon as he can.     [phone] office     [phone] Carmela Howard    Call after see Dr Be and abisai pitt

## 2020-08-18 ENCOUNTER — OFFICE VISIT (OUTPATIENT)
Dept: NEUROLOGY | Facility: CLINIC | Age: 40
End: 2020-08-18

## 2020-08-18 VITALS
BODY MASS INDEX: 31.37 KG/M2 | HEIGHT: 68 IN | TEMPERATURE: 98 F | DIASTOLIC BLOOD PRESSURE: 84 MMHG | WEIGHT: 207 LBS | SYSTOLIC BLOOD PRESSURE: 138 MMHG | OXYGEN SATURATION: 98 % | HEART RATE: 73 BPM

## 2020-08-18 DIAGNOSIS — G43.C0 PERIODIC HEADACHE SYNDROME, NOT INTRACTABLE: Primary | ICD-10-CM

## 2020-08-18 PROCEDURE — 99244 OFF/OP CNSLTJ NEW/EST MOD 40: CPT | Performed by: PSYCHIATRY & NEUROLOGY

## 2020-08-18 RX ORDER — AMITRIPTYLINE HYDROCHLORIDE 10 MG/1
10-20 TABLET, FILM COATED ORAL NIGHTLY
Qty: 60 TABLET | Refills: 11 | Status: SHIPPED | OUTPATIENT
Start: 2020-08-18 | End: 2021-02-12

## 2020-08-18 NOTE — PROGRESS NOTES
Subjective   Patient ID: Nick Christian is a 40 y.o. male     Chief Complaint   Patient presents with   • Arnold-Chiari Malformation   • Headache        History of Present Illness    40 y.o. male referred by Dr Leobardo Neal for migraines.  Developed HA at 10 yoa.  Located in occiput.  Frequency is daily to qod.  Quality is tingling and stabbing pain.  Lasts for hours.  Worse at night.  No alleviating factors.  Vision is blurry.    Moderate intensity.      MRI Brain, my review of films, 20 cerebellar ectopy 1.1 cm    Reviewed medical records:    Pt has h/o occipital HA radiating to vertex and bitemporal.  Assoc with blurred vision.      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      Past Medical History:   Diagnosis Date   • Arnold-Chiari deformity (CMS/HCC)    • Coronary artery disease    • Perthes disease    • Sleep apnea    • Thyroid disease      Family History   Problem Relation Age of Onset   • Heart disease Maternal Grandfather    • Diabetes Maternal Grandfather    • Heart disease Paternal Grandfather    • Rheumatologic disease Mother    • Diabetes Mother    • Hypertension Mother    • Diabetes Father    • Cancer Father    • Hypertension Maternal Grandmother      Social History     Socioeconomic History   • Marital status:      Spouse name: Not on file   • Number of children: Not on file   • Years of education: Not on file   • Highest education level: Not on file   Tobacco Use   • Smoking status: Former Smoker     Packs/day: 1.00     Types: Cigarettes     Last attempt to quit:      Years since quittin.6   • Smokeless tobacco: Former User     Types: Chew   Substance and Sexual Activity   • Alcohol use: Yes     Comment: occas   • Drug use: No   • Sexual activity: Defer       Review of Systems   Constitutional: Positive for fatigue. Negative for activity change and unexpected weight change.   HENT: Negative for facial swelling,  "trouble swallowing and voice change.    Eyes: Negative for visual disturbance.   Respiratory: Negative for apnea and choking.    Cardiovascular: Negative for chest pain.   Gastrointestinal: Negative for constipation.   Endocrine: Negative for cold intolerance.   Genitourinary: Negative for difficulty urinating, frequency and urgency.   Musculoskeletal: Positive for arthralgias. Negative for gait problem, myalgias and neck stiffness.   Skin: Negative for rash.   Allergic/Immunologic: Negative for immunocompromised state.   Neurological: Positive for numbness and headaches. Negative for tremors, syncope, facial asymmetry, speech difficulty and light-headedness.   Hematological: Negative for adenopathy.   Psychiatric/Behavioral: Negative for confusion, decreased concentration, hallucinations and sleep disturbance. The patient is not nervous/anxious.        Objective     Vitals:    08/18/20 1105   BP: 138/84   Pulse: 73   Temp: 98 °F (36.7 °C)   SpO2: 98%   Weight: 93.9 kg (207 lb)   Height: 172.7 cm (68\")       Neurologic Exam     Mental Status   Oriented to person, place, and time.   Registration: recalls 3 of 3 objects. Recall at 5 minutes: recalls 3 of 3 objects. Follows 3 step commands.   Attention: normal. Concentration: normal.   Speech: speech is normal   Level of consciousness: alert  Knowledge: good and consistent with education.   Able to name object. Able to read. Able to repeat. Able to write. Normal comprehension.     Cranial Nerves     CN II   Visual fields full to confrontation.   Visual acuity: normal  Right visual field deficit: none  Left visual field deficit: none     CN III, IV, VI   Pupils are equal, round, and reactive to light.  Extraocular motions are normal.   Right pupil: Shape: regular. Reactivity: brisk. Consensual response: intact.   Left pupil: Shape: regular. Reactivity: brisk. Consensual response: intact.   Nystagmus: none   Diplopia: none  Ophthalmoparesis: none  Upgaze: " normal  Downgaze: normal  Conjugate gaze: present  Vestibulo-ocular reflex: present    CN V   Facial sensation intact.   Right corneal reflex: normal  Left corneal reflex: normal    CN VII   Right facial weakness: none  Left facial weakness: none    CN VIII   Hearing: intact    CN IX, X   Palate: symmetric  Right gag reflex: normal  Left gag reflex: normal    CN XI   Right sternocleidomastoid strength: normal  Left sternocleidomastoid strength: normal    CN XII   Tongue: not atrophic  Fasciculations: absent  Tongue deviation: none    Motor Exam   Muscle bulk: normal  Overall muscle tone: normal  Right arm tone: normal  Left arm tone: normal  Right leg tone: normal  Left leg tone: normal    Strength   Strength 5/5 throughout.     Sensory Exam   Light touch normal.   Vibration normal.   Proprioception normal.   Pinprick normal.     Gait, Coordination, and Reflexes     Gait  Gait: normal    Coordination   Romberg: negative  Finger to nose coordination: normal  Heel to shin coordination: normal  Tandem walking coordination: normal    Tremor   Resting tremor: absent  Intention tremor: absent  Action tremor: absent    Reflexes   Reflexes 2+ except as noted.       Physical Exam   Constitutional: He is oriented to person, place, and time. Vital signs are normal. He appears well-developed and well-nourished.   HENT:   Head: Normocephalic and atraumatic.   Eyes: Pupils are equal, round, and reactive to light. EOM and lids are normal.   Fundoscopic exam:       The right eye shows no exudate, no hemorrhage and no papilledema. The right eye shows venous pulsations.        The left eye shows no exudate, no hemorrhage and no papilledema. The left eye shows venous pulsations.   Neck: Normal range of motion and phonation normal. Normal carotid pulses present. Carotid bruit is not present. No thyroid mass and no thyromegaly present.   Cardiovascular: Normal rate, regular rhythm and normal heart sounds.   Pulmonary/Chest: Effort  normal.   Neurological: He is oriented to person, place, and time. He has normal strength. He has a normal Finger-Nose-Finger Test, a normal Heel to Shin Test, a normal Romberg Test and a normal Tandem Gait Test. Gait normal.   Skin: Skin is warm and dry.   Psychiatric: He has a normal mood and affect. His speech is normal.   Nursing note and vitals reviewed.      No results found for any previous visit.         Assessment/Plan     Problem List Items Addressed This Visit        Cardiovascular and Mediastinum    Periodic headache syndrome, not intractable - Primary    Current Assessment & Plan     Headaches are newly identified.  Medication changes per orders.     Elavil 10 - 20 mg qhs              Relevant Medications    amitriptyline (ELAVIL) 10 MG tablet             No follow-ups on file.

## 2020-08-20 ENCOUNTER — OFFICE VISIT (OUTPATIENT)
Dept: FAMILY MEDICINE CLINIC | Facility: CLINIC | Age: 40
End: 2020-08-20

## 2020-08-20 VITALS
HEART RATE: 76 BPM | SYSTOLIC BLOOD PRESSURE: 122 MMHG | BODY MASS INDEX: 30.51 KG/M2 | RESPIRATION RATE: 12 BRPM | WEIGHT: 206 LBS | TEMPERATURE: 96.9 F | DIASTOLIC BLOOD PRESSURE: 74 MMHG | HEIGHT: 69 IN | OXYGEN SATURATION: 96 %

## 2020-08-20 DIAGNOSIS — M91.11 LEGG-PERTHES DISEASE, RIGHT: ICD-10-CM

## 2020-08-20 DIAGNOSIS — E03.8 HYPOTHYROIDISM DUE TO HASHIMOTO'S THYROIDITIS: ICD-10-CM

## 2020-08-20 DIAGNOSIS — Q07.00 ARNOLD-CHIARI MALFORMATION (HCC): ICD-10-CM

## 2020-08-20 DIAGNOSIS — E06.3 HYPOTHYROIDISM DUE TO HASHIMOTO'S THYROIDITIS: ICD-10-CM

## 2020-08-20 DIAGNOSIS — E78.2 MIXED HYPERLIPIDEMIA: ICD-10-CM

## 2020-08-20 DIAGNOSIS — Z00.00 HEALTHCARE MAINTENANCE: Primary | ICD-10-CM

## 2020-08-20 DIAGNOSIS — R73.03 PREDIABETES: ICD-10-CM

## 2020-08-20 DIAGNOSIS — G47.33 OSA (OBSTRUCTIVE SLEEP APNEA): ICD-10-CM

## 2020-08-20 PROCEDURE — 99204 OFFICE O/P NEW MOD 45 MIN: CPT | Performed by: GENERAL PRACTICE

## 2020-08-20 NOTE — PROGRESS NOTES
Siomara Christian is a 40 y.o. male.     History of Present Illness     This 40-year-old man presents today to establish care.    Arnold-Chiari Malformation  Type I.  Diagnosed in childhood.  Currently followed by neurology.  He admits to intermittent headaches described as a sharp bilateral occipital ache.  Apart from mild bilateral visual blurring he denies any associated symptoms but admits to longstanding bilateral hearing loss along with numbness of both feet.  He denies any other visual disturbances and has had no problems with his speech, strength, coordination, or bowel/bladder control.    Krwq-Gyjié-Bkwytea Disease  S/P right THR.  He complains of persistent hip pain with weightbearing.  He denies any stiffness or swelling and despite the pain denies any significant limitation of his activities or sleep.    Gastroesophageal Reflux Disease  He is on pantoprazole and denies any heartburn but continues to have intermittent episodes of regurgitation with nocturnal choking.  He denies any difficulty swallowing, nausea, vomiting, or abdominal pain and has had no change in his bowel habits, hematochezia, or melena.  He admits to snoring and states that he has undergone a sleep study in the past with evidence of JEMMA.  He was prescribed CPAP but was unconvinced of the validity of the study and has yet to start on it.    Hypothyroidism  He is currently maintained on levothyroxine.  His daytime energy levels are good but he states he typically develops sudden severe fatigue nightly.  He denies any constipation or dryness of the skin.    Hyperlipidemia  He was started on fenofibrate earlier this year for an elevated triglyceride level.  This has been associated with a low HDL and normal LDL.  He apparently has a more than 10-year history of prediabetes.  He has a strong family history of type 2 diabetes mellitus.    The following portions of the patient's history were reviewed and updated as appropriate:  allergies, current medications, past family history, past medical history, past social history, past surgical history and problem list.    Review of Systems   Constitutional: Positive for fatigue. Negative for appetite change, chills, fever and unexpected weight change.   HENT: Positive for hearing loss. Negative for congestion, ear pain, rhinorrhea, sneezing, sore throat and voice change.    Eyes: Positive for visual disturbance.   Respiratory: Negative for cough, shortness of breath and wheezing.         Snoring   Cardiovascular: Negative for chest pain, palpitations and leg swelling.   Gastrointestinal: Negative for abdominal pain, blood in stool, constipation, diarrhea, nausea and vomiting.        Regurgitation   Endocrine: Negative for polydipsia and polyuria.   Genitourinary: Negative for difficulty urinating, dysuria, frequency, hematuria and urgency.   Musculoskeletal: Positive for arthralgias and back pain. Negative for joint swelling, myalgias and neck pain.   Skin: Negative for rash.   Neurological: Positive for numbness and headaches. Negative for tremors and weakness.   Psychiatric/Behavioral: Positive for sleep disturbance. Negative for dysphoric mood and suicidal ideas. The patient is not nervous/anxious.      Objective   Physical Exam   Constitutional: He is oriented to person, place, and time. He appears well-developed and well-nourished. He is cooperative. He does not have a sickly appearance. No distress.   Bright and in good spirits. No apparent distress. No pallor, jaundice, diaphoresis, or cyanosis.   HENT:   Head: Atraumatic.   Right Ear: External ear and ear canal normal. Tympanic membrane is scarred.   Left Ear: External ear and ear canal normal. Tympanic membrane is scarred.   Eyes: Pupils are equal, round, and reactive to light. EOM are normal. No scleral icterus.   Neck: No JVD present. Carotid bruit is not present. No tracheal deviation present. No thyromegaly present.   Cardiovascular:  Normal rate, regular rhythm, S1 normal, S2 normal, normal heart sounds and intact distal pulses. Exam reveals no gallop.   No murmur heard.  Pulmonary/Chest: Breath sounds normal. He has no wheezes. He has no rales.   Abdominal: Soft. Normal aorta and bowel sounds are normal. He exhibits no abdominal bruit and no mass. There is no hepatosplenomegaly. There is no tenderness. No hernia.   Musculoskeletal: He exhibits no tenderness or deformity.   Lymphadenopathy:        Head (right side): No submandibular adenopathy present.        Head (left side): No submandibular adenopathy present.     He has no cervical adenopathy.   Neurological: He is alert and oriented to person, place, and time. He has normal strength and normal reflexes. He displays normal reflexes. No cranial nerve deficit. He exhibits normal muscle tone. Coordination normal.   Skin: Skin is warm and dry. No rash noted. He is not diaphoretic. No pallor. Nails show no clubbing.   Psychiatric: He has a normal mood and affect. His behavior is normal.     Assessment/Plan   Problems Addressed this Visit        Cardiovascular and Mediastinum    Mixed hyperlipidemia  Encouraged to continue to work on his diet and exercise plan.  If his insurance allows will replace fenofibrate with vascepa  Scheduled for updated labs in 2 to 3 months    Relevant Orders    Comprehensive Metabolic Panel    Lipid Panel       Endocrine    Hypothyroidism due to Hashimoto's thyroiditis  Clinically and bio-chemically euthyroid.  Continue current medication.    Relevant Orders    TSH    Prediabetes  As above.  Reviewed the potential benefits and risks of metformin.  Patient will consider    Relevant Orders    Comprehensive Metabolic Panel    Hemoglobin A1c       Nervous and Auditory    Arnold-Chiari malformation (CMS/HCC)  Follow up with neurology        Musculoskeletal and Integument    Legg-Perthes disease, right  S/P right JULIUS  Appears to be doing well  Encouraged to report if this  should change.       Other    JEMMA  We will arrange a sleep medicine evaluation  Healthcare maintenance   Recommended a flu shot when available    Relevant Orders    CBC & Differential    Hepatitis C Antibody

## 2020-08-21 ENCOUNTER — PRIOR AUTHORIZATION (OUTPATIENT)
Dept: FAMILY MEDICINE CLINIC | Facility: CLINIC | Age: 40
End: 2020-08-21

## 2020-09-04 RX ORDER — LEVOTHYROXINE SODIUM 0.12 MG/1
125 TABLET ORAL DAILY
Qty: 30 TABLET | Refills: 5 | Status: SHIPPED | OUTPATIENT
Start: 2020-09-04 | End: 2020-12-31 | Stop reason: SDUPTHER

## 2020-09-04 RX ORDER — PANTOPRAZOLE SODIUM 20 MG/1
40 TABLET, DELAYED RELEASE ORAL DAILY
Qty: 30 TABLET | Refills: 5 | Status: SHIPPED | OUTPATIENT
Start: 2020-09-04 | End: 2020-09-08

## 2020-09-08 ENCOUNTER — OFFICE VISIT (OUTPATIENT)
Dept: FAMILY MEDICINE CLINIC | Facility: CLINIC | Age: 40
End: 2020-09-08

## 2020-09-08 ENCOUNTER — HOSPITAL ENCOUNTER (EMERGENCY)
Facility: HOSPITAL | Age: 40
Discharge: HOME | End: 2020-09-08
Attending: EMERGENCY MEDICINE | Admitting: EMERGENCY MEDICINE

## 2020-09-08 ENCOUNTER — APPOINTMENT (OUTPATIENT)
Dept: GENERAL RADIOLOGY | Facility: HOSPITAL | Age: 40
End: 2020-09-08

## 2020-09-08 VITALS
RESPIRATION RATE: 18 BRPM | WEIGHT: 195 LBS | HEIGHT: 69 IN | SYSTOLIC BLOOD PRESSURE: 118 MMHG | OXYGEN SATURATION: 98 % | TEMPERATURE: 98.5 F | BODY MASS INDEX: 28.88 KG/M2 | HEART RATE: 98 BPM | DIASTOLIC BLOOD PRESSURE: 76 MMHG

## 2020-09-08 VITALS — TEMPERATURE: 99 F

## 2020-09-08 DIAGNOSIS — J20.8 ACUTE BACTERIAL BRONCHITIS: Primary | ICD-10-CM

## 2020-09-08 DIAGNOSIS — R05.9 COUGH: ICD-10-CM

## 2020-09-08 DIAGNOSIS — B96.89 ACUTE BACTERIAL BRONCHITIS: Primary | ICD-10-CM

## 2020-09-08 DIAGNOSIS — K21.9 GASTROESOPHAGEAL REFLUX DISEASE WITHOUT ESOPHAGITIS: Primary | ICD-10-CM

## 2020-09-08 DIAGNOSIS — Z20.822 SHORTNESS OF BREATH WITH EXPOSURE TO COVID-19 VIRUS: Primary | ICD-10-CM

## 2020-09-08 DIAGNOSIS — R06.02 SHORTNESS OF BREATH WITH EXPOSURE TO COVID-19 VIRUS: Primary | ICD-10-CM

## 2020-09-08 LAB
BASOPHILS # BLD AUTO: 0.1 10*3/MM3 (ref 0–0.2)
BASOPHILS NFR BLD AUTO: 0.9 % (ref 0–1.5)
DEPRECATED RDW RBC AUTO: 39.8 FL (ref 37–54)
EOSINOPHIL # BLD AUTO: 0.31 10*3/MM3 (ref 0–0.4)
EOSINOPHIL NFR BLD AUTO: 2.9 % (ref 0.3–6.2)
ERYTHROCYTE [DISTWIDTH] IN BLOOD BY AUTOMATED COUNT: 12.9 % (ref 12.3–15.4)
HCT VFR BLD AUTO: 45.5 % (ref 37.5–51)
HGB BLD-MCNC: 15.5 G/DL (ref 13–17.7)
IMM GRANULOCYTES # BLD AUTO: 0.14 10*3/MM3 (ref 0–0.05)
IMM GRANULOCYTES NFR BLD AUTO: 1.3 % (ref 0–0.5)
LYMPHOCYTES # BLD AUTO: 3.86 10*3/MM3 (ref 0.7–3.1)
LYMPHOCYTES NFR BLD AUTO: 36.3 % (ref 19.6–45.3)
MCH RBC QN AUTO: 28.9 PG (ref 26.6–33)
MCHC RBC AUTO-ENTMCNC: 34.1 G/DL (ref 31.5–35.7)
MCV RBC AUTO: 84.9 FL (ref 79–97)
MONOCYTES # BLD AUTO: 0.82 10*3/MM3 (ref 0.1–0.9)
MONOCYTES NFR BLD AUTO: 7.7 % (ref 5–12)
NEUTROPHILS NFR BLD AUTO: 5.4 10*3/MM3 (ref 1.7–7)
NEUTROPHILS NFR BLD AUTO: 50.9 % (ref 42.7–76)
NRBC BLD AUTO-RTO: 0 /100 WBC (ref 0–0.2)
PLATELET # BLD AUTO: 344 10*3/MM3 (ref 140–450)
PMV BLD AUTO: 8.2 FL (ref 6–12)
RBC # BLD AUTO: 5.36 10*6/MM3 (ref 4.14–5.8)
SARS-COV-2 RDRP RESP QL NAA+PROBE: NOT DETECTED
WBC # BLD AUTO: 10.63 10*3/MM3 (ref 3.4–10.8)

## 2020-09-08 PROCEDURE — 99284 EMERGENCY DEPT VISIT MOD MDM: CPT

## 2020-09-08 PROCEDURE — 87635 SARS-COV-2 COVID-19 AMP PRB: CPT | Performed by: EMERGENCY MEDICINE

## 2020-09-08 PROCEDURE — 93010 ELECTROCARDIOGRAM REPORT: CPT | Performed by: INTERNAL MEDICINE

## 2020-09-08 PROCEDURE — 93005 ELECTROCARDIOGRAM TRACING: CPT | Performed by: EMERGENCY MEDICINE

## 2020-09-08 PROCEDURE — 71045 X-RAY EXAM CHEST 1 VIEW: CPT

## 2020-09-08 PROCEDURE — 99442 PR PHYS/QHP TELEPHONE EVALUATION 11-20 MIN: CPT | Performed by: NURSE PRACTITIONER

## 2020-09-08 PROCEDURE — 71045 X-RAY EXAM CHEST 1 VIEW: CPT | Performed by: RADIOLOGY

## 2020-09-08 PROCEDURE — 85025 COMPLETE CBC W/AUTO DIFF WBC: CPT | Performed by: EMERGENCY MEDICINE

## 2020-09-08 RX ORDER — FENOFIBRATE 160 MG/1
TABLET ORAL
COMMUNITY
Start: 2020-09-07 | End: 2020-12-31

## 2020-09-08 RX ORDER — SODIUM CHLORIDE 0.9 % (FLUSH) 0.9 %
10 SYRINGE (ML) INJECTION AS NEEDED
Status: DISCONTINUED | OUTPATIENT
Start: 2020-09-08 | End: 2020-09-08 | Stop reason: HOSPADM

## 2020-09-08 RX ORDER — AZITHROMYCIN 500 MG/1
TABLET, FILM COATED ORAL
COMMUNITY
Start: 2020-09-04 | End: 2020-09-09

## 2020-09-08 RX ORDER — PANTOPRAZOLE SODIUM 40 MG/1
40 TABLET, DELAYED RELEASE ORAL DAILY
Qty: 30 TABLET | Refills: 2 | Status: SHIPPED | OUTPATIENT
Start: 2020-09-08 | End: 2020-12-31 | Stop reason: SDUPTHER

## 2020-09-08 NOTE — ED PROVIDER NOTES
Subjective   Patient presents to ER with shortness of breath and cough. He works at [employer] and fears he may have Covid-19      Shortness of Breath   Severity:  Moderate  Onset quality:  Gradual  Duration:  4 days  Timing:  Constant  Progression:  Worsening  Chronicity:  New  Context: activity    Relieved by:  Rest  Worsened by:  Exertion and movement  Ineffective treatments:  None tried      Review of Systems   Constitutional: Positive for appetite change and fatigue.   HENT: Negative.    Eyes: Negative.    Respiratory: Positive for shortness of breath.    Cardiovascular: Negative.    Gastrointestinal: Negative.    Endocrine: Negative.    Genitourinary: Negative.    Musculoskeletal: Negative.    Skin: Negative.    Allergic/Immunologic: Negative.    Neurological: Negative.    Hematological: Negative.    Psychiatric/Behavioral: Negative.        Past Medical History:   Diagnosis Date   • Arnold-Chiari deformity (CMS/HCC)    • Coronary artery disease    • Perthes disease    • Sleep apnea    • Thyroid disease        Allergies   Allergen Reactions   • Morphine Mental Status Change       Past Surgical History:   Procedure Laterality Date   • GALLBLADDER SURGERY     • HERNIA REPAIR     • HIP BIPOLAR REPLACEMENT  2019       Family History   Problem Relation Age of Onset   • Heart disease Maternal Grandfather    • Diabetes Maternal Grandfather    • Heart disease Paternal Grandfather    • Rheumatologic disease Mother    • Diabetes Mother    • Hypertension Mother    • Diabetes Father    • Cancer Father    • Hypertension Maternal Grandmother        Social History     Socioeconomic History   • Marital status:      Spouse name: Not on file   • Number of children: Not on file   • Years of education: Not on file   • Highest education level: Not on file   Tobacco Use   • Smoking status: Former Smoker     Packs/day: 1.00     Types: Cigarettes     Last attempt to quit:      Years since quittin.6    • Smokeless tobacco: Former User     Types: Chew   Substance and Sexual Activity   • Alcohol use: Yes     Comment: occas   • Drug use: No   • Sexual activity: Defer           Objective   Physical Exam   Constitutional: He appears well-developed and well-nourished.   HENT:   Head: Normocephalic and atraumatic.   Mouth/Throat: Oropharynx is clear and moist.   Eyes: Pupils are equal, round, and reactive to light.   Neck: Normal range of motion.   Cardiovascular: Normal rate.   Pulmonary/Chest: No respiratory distress. He has decreased breath sounds in the right lower field and the left lower field. He has wheezes in the right upper field.   Abdominal: Soft.   Musculoskeletal: Normal range of motion.        Right lower leg: Normal.        Left lower leg: Normal.   Neurological: He is alert.   Skin: Skin is warm. Capillary refill takes less than 2 seconds.   Psychiatric: He has a normal mood and affect.   Nursing note and vitals reviewed.      Procedures           ED Course                                           MDM    Final diagnoses:   Acute bacterial bronchitis            Venkatesh Zhang MD  09/08/20 7202

## 2020-09-08 NOTE — PROGRESS NOTES
You have chosen to receive care through a telephone visit. Do you consent to use a telephone visit for your medical care today? Yes  Nick Christian is a 40 y.o. male who is c/o upper respiratory symptoms. Started a couple of weeks ago with cough, shortness of breath and wheezing. He did go to Urgent Care on 09/03/2020 and was tested for COVID which was negative. He was given an steroid and antibiotic injection. Started on Zithromax.He reports also his chest x ray showed he had food in his lungs.  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      URI    The current episode started 1 to 4 weeks ago. The problem has been gradually worsening. Maximum temperature: Intermittent fever. Associated symptoms include congestion, coughing, diarrhea, headaches, rhinorrhea, sinus pain and wheezing. Pertinent negatives include no chest pain, ear pain, nausea or sore throat. Treatments tried: Zithromax and steroid injection. The treatment provided mild relief.      The following portions of the patient's history were reviewed and updated as appropriate: allergies, current medications, past family history, past medical history, past social history, past surgical history and problem list.    Current Outpatient Medications:   •  amitriptyline (ELAVIL) 10 MG tablet, Take 1-2 tablets by mouth Every Night., Disp: 60 tablet, Rfl: 11  •  azithromycin (ZITHROMAX) 500 MG tablet, TK 1 T PO QD FOR 5 DAYS, Disp: , Rfl:   •  levothyroxine (SYNTHROID, LEVOTHROID) 125 MCG tablet, Take 1 tablet by mouth Daily., Disp: 30 tablet, Rfl: 5  •  pantoprazole (PROTONIX) 20 MG EC tablet, Take 2 tablets by mouth Daily., Disp: 30 tablet, Rfl: 5  •  fenofibrate 160 MG tablet, , Disp: , Rfl:     Allergies   Allergen Reactions   • Morphine Mental Status Change     Review of Systems   Constitutional: Positive for activity change, appetite change, chills, fatigue and fever.        Decrease  taste   HENT: Positive for congestion, postnasal drip, rhinorrhea, sinus pressure and sinus pain. Negative for ear pain and sore throat.    Eyes: Positive for pain and redness. Negative for discharge and itching.   Respiratory: Positive for cough, chest tightness, shortness of breath and wheezing.    Cardiovascular: Negative for chest pain, palpitations and leg swelling.   Gastrointestinal: Positive for diarrhea. Negative for nausea.   Neurological: Positive for headaches.   All other systems reviewed and are negative.    Visit Vitals  Temp 99 °F (37.2 °C) (Temporal)     Physical Exam   Constitutional: He is oriented to person, place, and time.   Neurological: He is alert and oriented to person, place, and time.   Psychiatric: His speech is normal and behavior is normal. His mood appears anxious.   Vitals reviewed.  Extremely short of breath.   Assessment/Plan   Diagnoses and all orders for this visit:    Shortness of breath with exposure to COVID-19 virus    Cough      Symptoms discussed with Nick and treatment options. Recommended he seek higher level of care at this time. He does agree to go to [facility] for further evaluation.  This visit has been scheduled as a phone visit to comply with patient safety concerns in accordance with CDC recommendations. Total time of discussion was 15 minutes.    This document has been electronically signed by EMMETT Tracy, FLORA PACE  September 8, 2020 11:07

## 2020-09-09 DIAGNOSIS — J40 BRONCHITIS: Primary | ICD-10-CM

## 2020-09-09 RX ORDER — ALBUTEROL SULFATE 2.5 MG/3ML
2.5 SOLUTION RESPIRATORY (INHALATION) EVERY 6 HOURS PRN
Qty: 150 ML | Refills: 2 | Status: SHIPPED | OUTPATIENT
Start: 2020-09-09 | End: 2021-01-14 | Stop reason: SDUPTHER

## 2020-09-09 RX ORDER — DOXYCYCLINE HYCLATE 100 MG/1
100 CAPSULE ORAL 2 TIMES DAILY
Qty: 20 CAPSULE | Refills: 0 | Status: SHIPPED | OUTPATIENT
Start: 2020-09-09 | End: 2020-09-19

## 2020-09-09 RX ORDER — PREDNISONE 20 MG/1
20 TABLET ORAL 2 TIMES DAILY
Qty: 10 TABLET | Refills: 0 | Status: SHIPPED | OUTPATIENT
Start: 2020-09-09 | End: 2020-09-14

## 2020-10-14 ENCOUNTER — OFFICE VISIT (OUTPATIENT)
Dept: NEUROLOGY | Facility: CLINIC | Age: 40
End: 2020-10-14

## 2020-10-14 VITALS
SYSTOLIC BLOOD PRESSURE: 124 MMHG | BODY MASS INDEX: 31.55 KG/M2 | DIASTOLIC BLOOD PRESSURE: 78 MMHG | TEMPERATURE: 97.1 F | HEART RATE: 85 BPM | WEIGHT: 213 LBS | HEIGHT: 69 IN | OXYGEN SATURATION: 98 %

## 2020-10-14 DIAGNOSIS — G43.C0 PERIODIC HEADACHE SYNDROME, NOT INTRACTABLE: Primary | ICD-10-CM

## 2020-10-14 PROCEDURE — 99213 OFFICE O/P EST LOW 20 MIN: CPT | Performed by: PSYCHIATRY & NEUROLOGY

## 2020-10-14 RX ORDER — TOPIRAMATE 25 MG/1
TABLET ORAL
Qty: 120 TABLET | Refills: 3 | Status: SHIPPED | OUTPATIENT
Start: 2020-10-14 | End: 2022-02-04

## 2020-10-14 NOTE — PROGRESS NOTES
Subjective   Patient ID: Nick Christian is a 40 y.o. male     Chief Complaint   Patient presents with   • Periodic Headache Syndrome     8 week follow up         History of Present Illness    40 y.o. male returns in follow up for migraines.  Last visit on 2020 started Elavil.      MRI Brain, my review of films, 20 cerebellar ectopy 1.1 cm    HA are daily.  Worse with movement.  Located in occiput radiates to sides and down back.  Tingling in fingers. Elavil of no benefit.  Moderate to severe intensity. Assoc sx of blurry vision.      Problem history:    Developed HA at 10 yoa.  Located in occiput.  Frequency is daily to qod.  Quality is tingling and stabbing pain.  Lasts for hours.  Worse at night.  No alleviating factors.  Vision is blurry.    Moderate intensity.      Reviewed medical records:    Pt has h/o occipital HA radiating to vertex and bitemporal.  Assoc with blurred vision.      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      Past Medical History:   Diagnosis Date   • Arnold-Chiari deformity (CMS/HCC)    • Coronary artery disease    • Perthes disease    • Sleep apnea    • Thyroid disease      Family History   Problem Relation Age of Onset   • Heart disease Maternal Grandfather    • Diabetes Maternal Grandfather    • Heart disease Paternal Grandfather    • Rheumatologic disease Mother    • Diabetes Mother    • Hypertension Mother    • Diabetes Father    • Cancer Father    • Hypertension Maternal Grandmother      Social History     Socioeconomic History   • Marital status:      Spouse name: Not on file   • Number of children: Not on file   • Years of education: Not on file   • Highest education level: Not on file   Tobacco Use   • Smoking status: Former Smoker     Packs/day: 1.00     Types: Cigarettes     Quit date:      Years since quittin.7   • Smokeless tobacco: Former User     Types: Chew   Substance and Sexual  "Activity   • Alcohol use: Yes     Comment: occas   • Drug use: No   • Sexual activity: Defer       Review of Systems   Constitutional: Positive for fatigue. Negative for activity change and unexpected weight change.   HENT: Negative for facial swelling, trouble swallowing and voice change.    Eyes: Negative for visual disturbance.   Respiratory: Negative for apnea and choking.    Cardiovascular: Negative for chest pain.   Gastrointestinal: Negative for constipation.   Endocrine: Negative for cold intolerance.   Genitourinary: Negative for difficulty urinating, frequency and urgency.   Musculoskeletal: Positive for arthralgias. Negative for gait problem, myalgias and neck stiffness.   Skin: Negative for rash.   Allergic/Immunologic: Negative for immunocompromised state.   Neurological: Positive for numbness and headaches. Negative for tremors, syncope, facial asymmetry, speech difficulty and light-headedness.   Hematological: Negative for adenopathy.   Psychiatric/Behavioral: Negative for confusion, decreased concentration, hallucinations and sleep disturbance. The patient is not nervous/anxious.        Objective     Vitals:    10/14/20 1115   BP: 124/78   Pulse: 85   Temp: 97.1 °F (36.2 °C)   SpO2: 98%   Weight: 96.6 kg (213 lb)   Height: 175.3 cm (69\")       Neurologic Exam     Mental Status   Registration: recalls 3 of 3 objects. Recall at 5 minutes: recalls 3 of 3 objects. Follows 3 step commands.   Attention: normal. Concentration: normal.   Level of consciousness: alert  Knowledge: good and consistent with education.   Able to name object. Able to read. Able to repeat. Able to write. Normal comprehension.     Cranial Nerves     CN II   Visual fields full to confrontation.   Visual acuity: normal  Right visual field deficit: none  Left visual field deficit: none     CN III, IV, VI   Right pupil: Shape: regular. Reactivity: brisk. Consensual response: intact.   Left pupil: Shape: regular. Reactivity: brisk. " Consensual response: intact.   Nystagmus: none   Diplopia: none  Ophthalmoparesis: none  Upgaze: normal  Downgaze: normal  Conjugate gaze: present  Vestibulo-ocular reflex: present    CN V   Facial sensation intact.   Right corneal reflex: normal  Left corneal reflex: normal    CN VII   Right facial weakness: none  Left facial weakness: none    CN VIII   Hearing: intact    CN IX, X   Palate: symmetric  Right gag reflex: normal  Left gag reflex: normal    CN XI   Right sternocleidomastoid strength: normal  Left sternocleidomastoid strength: normal    CN XII   Tongue: not atrophic  Fasciculations: absent  Tongue deviation: none    Motor Exam   Muscle bulk: normal  Overall muscle tone: normal  Right arm tone: normal  Left arm tone: normal  Right leg tone: normal  Left leg tone: normal    Sensory Exam   Light touch normal.   Vibration normal.   Proprioception normal.   Pinprick normal.     Gait, Coordination, and Reflexes     Tremor   Resting tremor: absent  Intention tremor: absent  Action tremor: absent    Reflexes   Reflexes 2+ except as noted.       Physical Exam   Abdominal: Normal appearance.   Neurological: He is alert.   Nursing note and vitals reviewed.      Admission on 09/08/2020, Discharged on 09/08/2020   Component Date Value Ref Range Status   • WBC 09/08/2020 10.63  3.40 - 10.80 10*3/mm3 Final   • RBC 09/08/2020 5.36  4.14 - 5.80 10*6/mm3 Final   • Hemoglobin 09/08/2020 15.5  13.0 - 17.7 g/dL Final   • Hematocrit 09/08/2020 45.5  37.5 - 51.0 % Final   • MCV 09/08/2020 84.9  79.0 - 97.0 fL Final   • MCH 09/08/2020 28.9  26.6 - 33.0 pg Final   • MCHC 09/08/2020 34.1  31.5 - 35.7 g/dL Final   • RDW 09/08/2020 12.9  12.3 - 15.4 % Final   • RDW-SD 09/08/2020 39.8  37.0 - 54.0 fl Final   • MPV 09/08/2020 8.2  6.0 - 12.0 fL Final   • Platelets 09/08/2020 344  140 - 450 10*3/mm3 Final   • Neutrophil % 09/08/2020 50.9  42.7 - 76.0 % Final   • Lymphocyte % 09/08/2020 36.3  19.6 - 45.3 % Final   • Monocyte %  09/08/2020 7.7  5.0 - 12.0 % Final   • Eosinophil % 09/08/2020 2.9  0.3 - 6.2 % Final   • Basophil % 09/08/2020 0.9  0.0 - 1.5 % Final   • Immature Grans % 09/08/2020 1.3* 0.0 - 0.5 % Final   • Neutrophils, Absolute 09/08/2020 5.40  1.70 - 7.00 10*3/mm3 Final   • Lymphocytes, Absolute 09/08/2020 3.86* 0.70 - 3.10 10*3/mm3 Final   • Monocytes, Absolute 09/08/2020 0.82  0.10 - 0.90 10*3/mm3 Final   • Eosinophils, Absolute 09/08/2020 0.31  0.00 - 0.40 10*3/mm3 Final   • Basophils, Absolute 09/08/2020 0.10  0.00 - 0.20 10*3/mm3 Final   • Immature Grans, Absolute 09/08/2020 0.14* 0.00 - 0.05 10*3/mm3 Final   • nRBC 09/08/2020 0.0  0.0 - 0.2 /100 WBC Final   • COVID19 09/08/2020 Not Detected  Not Detected - Ref. Range Final         Assessment/Plan     Problem List Items Addressed This Visit        Cardiovascular and Mediastinum    Periodic headache syndrome, not intractable - Primary    Current Assessment & Plan     Headaches are worsening.  Medication changes per orders.     Stop Elavil    Start TPM              Relevant Medications    amitriptyline (ELAVIL) 10 MG tablet    topiramate (Topamax) 25 MG tablet             No follow-ups on file.

## 2020-10-22 ENCOUNTER — CONSULT (OUTPATIENT)
Dept: SLEEP MEDICINE | Facility: HOSPITAL | Age: 40
End: 2020-10-22

## 2020-10-22 VITALS
DIASTOLIC BLOOD PRESSURE: 90 MMHG | SYSTOLIC BLOOD PRESSURE: 125 MMHG | HEART RATE: 79 BPM | HEIGHT: 69 IN | OXYGEN SATURATION: 99 % | WEIGHT: 209.6 LBS | BODY MASS INDEX: 31.04 KG/M2

## 2020-10-22 DIAGNOSIS — E66.9 OBESITY (BMI 30-39.9): Primary | ICD-10-CM

## 2020-10-22 DIAGNOSIS — R40.0 SLEEPINESS: ICD-10-CM

## 2020-10-22 DIAGNOSIS — G47.33 OSA (OBSTRUCTIVE SLEEP APNEA): ICD-10-CM

## 2020-10-22 DIAGNOSIS — G43.C0 PERIODIC HEADACHE SYNDROME, NOT INTRACTABLE: ICD-10-CM

## 2020-10-22 PROCEDURE — 99244 OFF/OP CNSLTJ NEW/EST MOD 40: CPT | Performed by: INTERNAL MEDICINE

## 2020-10-22 NOTE — PROGRESS NOTES
Nick Christian is a 40 y.o. male.   Chief Complaint   Patient presents with   • Sleeping Problem       HPI     40 y.o. male seen in consultation at the request of Nita Goss DO for evaluation of the above.     He has approximate 10-year history of snoring and increasing daytime somnolence.  This has gotten to the point to where he will fall asleep he is quiet during the day.  He is a very energetic individual and does not have problems staying awake as long as he is an active.  His sleepiness scale is elevated at 18.    Is also had increasingly severe headaches.  He does have a Chiari malformation and has seen neurosurgery as well as neurology and the etiology of his headaches is unclear but not clearly related to the Chiari malformation.    He underwent a sleep study in [place] which was imperfect quality.  He had a decreased total amount of sleep time but did have about 3 hours of sleep did have an elevation of his AHI in the 6-7 range but it was felt that the AHI was likely underestimated.  I have these records.    Further details are as follows:    [place] Scale is: 18/24    Estimated average amount of sleep per night: 5  Number of times he wakes up at night: Multiple  Difficulty falling back asleep: no  It usually takes 0 minutes to go to sleep.  He feels sleepy upon waking up: yes  Rotating or night shift work: no    Drowsiness/Sleepiness:  He exhibits the following:  excessive daytime sleepiness    Snoring/Breathing:  He exhibits the following:  loud snoring  quits breathing at night  awakens gasping for breath  morning headaches    Reflux:  He describes the following:  takes medication for reflux    Narcolepsy:  He exhibits the following:  none    RLS/PLMs:  He describes the following:  moves or jerks during sleep    Insomnia:  He describes the following:  frequent awakenings  restless sleep    Parasomnia:  He exhibits the following:  sleep walks    Weight:  Weight change in the last year:  gain: 20  "lbs      The patient's relevant past medical, surgical, family, and social history reviewed and updated in Epic as appropriate.    Current medications are:   Current Outpatient Medications:   •  albuterol (PROVENTIL) (2.5 MG/3ML) 0.083% nebulizer solution, Take 2.5 mg by nebulization Every 6 (Six) Hours As Needed for Wheezing., Disp: 150 mL, Rfl: 2  •  amitriptyline (ELAVIL) 10 MG tablet, Take 1-2 tablets by mouth Every Night., Disp: 60 tablet, Rfl: 11  •  fenofibrate 160 MG tablet, , Disp: , Rfl:   •  levothyroxine (SYNTHROID, LEVOTHROID) 125 MCG tablet, Take 1 tablet by mouth Daily., Disp: 30 tablet, Rfl: 5  •  pantoprazole (PROTONIX) 40 MG EC tablet, Take 1 tablet by mouth Daily., Disp: 30 tablet, Rfl: 2  •  topiramate (Topamax) 25 MG tablet, Take one tablet twice a day for one week, then two tablets twice a day, Disp: 120 tablet, Rfl: 3.    Review of Systems    Review of Systems  ROS documented in patient questionnaire ×14 systems.  Reviewed with patient.  Otherwise negative except as noted in HPI.    Physical Exam    Blood pressure 125/90, pulse 79, height 175.3 cm (69\"), weight 95.1 kg (209 lb 9.6 oz), SpO2 99 %. Body mass index is 30.95 kg/m².    Physical Exam  Vitals signs and nursing note reviewed.   Constitutional:       Appearance: Normal appearance. He is well-developed.   HENT:      Head: Normocephalic and atraumatic.      Nose: Nose normal.      Mouth/Throat:      Mouth: Mucous membranes are moist.      Pharynx: Oropharynx is clear. No oropharyngeal exudate.      Comments: Class IV airway  Eyes:      General: No scleral icterus.     Conjunctiva/sclera: Conjunctivae normal.   Neck:      Thyroid: No thyromegaly.      Trachea: No tracheal deviation.   Cardiovascular:      Rate and Rhythm: Normal rate and regular rhythm.      Heart sounds: No murmur. No friction rub. No gallop.    Pulmonary:      Effort: Pulmonary effort is normal. No respiratory distress.      Breath sounds: No wheezing or rales. "   Musculoskeletal: Normal range of motion.         General: No deformity.   Skin:     General: Skin is warm and dry.      Findings: No rash.   Neurological:      Mental Status: He is alert and oriented to person, place, and time.   Psychiatric:         Behavior: Behavior normal.         Thought Content: Thought content normal.         ASSESSMENT:    Problem List Items Addressed This Visit        Pulmonary Problems    JEMMA (obstructive sleep apnea)       Other    Obesity (BMI 30-39.9) - Primary    Sleepiness    Periodic headache syndrome, not intractable          40-year-old male with obstructive sleep apnea proven by overnight polysomnogram.  This was done in [place].  He has excessive daytime somnolence, snoring, witnessed apneas, and severe headaches.  He also has worsening somnambulism.    I think many if not all of his reported symptoms may be related to untreated obstructive sleep apnea.    PLAN:    1. We discussed the results of the sleep study and I described the nature of obstructive sleep apnea, its signs and symptoms, and its long-term effects.  2. He is very interested in getting treated and I discussed treatment options including CPAP therapy, oral appliances, and surgery.  He is interested in starting with CPAP therapy  3. I will order auto CPAP with a range of 8-18 cm H2O with a mask fitting with the DME company of his choice  4. Close sleep center follow-up to assess efficacy and compliance with therapy    I have reviewed the results of my evaluation and impression and discussed my recommendations in detail with the patient.    Level of Risk Moderate due to: mild exacerbation of one chronic illness and undiagnosed new problem    Signed by  Uriah Newman MD    October 22, 2020      CC: Romero Moraes MD Lucas, Elizabeth S, DO

## 2020-11-30 ENCOUNTER — OFFICE VISIT (OUTPATIENT)
Dept: FAMILY MEDICINE CLINIC | Facility: CLINIC | Age: 40
End: 2020-11-30

## 2020-11-30 VITALS
HEIGHT: 69 IN | SYSTOLIC BLOOD PRESSURE: 115 MMHG | HEART RATE: 88 BPM | WEIGHT: 215 LBS | TEMPERATURE: 97.7 F | DIASTOLIC BLOOD PRESSURE: 80 MMHG | OXYGEN SATURATION: 98 % | BODY MASS INDEX: 31.84 KG/M2

## 2020-11-30 DIAGNOSIS — R06.02 SHORTNESS OF BREATH: ICD-10-CM

## 2020-11-30 DIAGNOSIS — R50.9 FEVER, UNSPECIFIED FEVER CAUSE: Primary | ICD-10-CM

## 2020-11-30 PROCEDURE — 99213 OFFICE O/P EST LOW 20 MIN: CPT | Performed by: PHYSICIAN ASSISTANT

## 2020-11-30 PROCEDURE — U0004 COV-19 TEST NON-CDC HGH THRU: HCPCS | Performed by: PHYSICIAN ASSISTANT

## 2020-11-30 RX ORDER — AZITHROMYCIN 250 MG/1
TABLET, FILM COATED ORAL
Qty: 6 TABLET | Refills: 0 | Status: SHIPPED | OUTPATIENT
Start: 2020-11-30 | End: 2020-12-31

## 2020-11-30 RX ORDER — PREDNISONE 20 MG/1
TABLET ORAL
Qty: 10 TABLET | Refills: 0 | Status: SHIPPED | OUTPATIENT
Start: 2020-11-30 | End: 2020-12-10

## 2020-11-30 RX ORDER — ALBUTEROL SULFATE 90 UG/1
AEROSOL, METERED RESPIRATORY (INHALATION)
COMMUNITY
Start: 2020-11-01 | End: 2021-02-10 | Stop reason: SDUPTHER

## 2020-11-30 NOTE — PROGRESS NOTES
Siomara Christian is a 40 y.o. male.       Chief Complaint -fever    History of Present Illness -       Fever-  He complains of fever 104 °F that has been present for the past 5 days.  He states that he last had a fever earlier this morning around 7 AM.  He complains of nausea and abdominal cramping.  He complains of associated shortness of breath (worse over the past 5 days), fatigue and chest congestion.  He also reports that he has lost his sense of taste.  He states that he was exposed to a coworker that was positive for Covid recently.    Shortness of breath-  He also complains of a chronic shortness of breath that has been present for the last 4 months.  He is interested in a pulmonary work-up due to this chronic shortness of breath.    The following portions of the patient's history were reviewed and updated as appropriate: allergies, current medications, past family history, past medical history, past social history, past surgical history and problem list.    Review of Systems   Constitutional: Positive for activity change, appetite change, chills, fatigue and fever.   HENT: Positive for congestion. Negative for ear pain, sinus pressure and sore throat.    Eyes: Negative for pain and visual disturbance.   Respiratory: Positive for cough and shortness of breath.    Cardiovascular: Negative for chest pain and palpitations.   Gastrointestinal: Positive for abdominal pain and nausea. Negative for constipation, diarrhea and vomiting.   Endocrine: Negative for polydipsia and polyuria.   Genitourinary: Negative for dysuria and frequency.   Musculoskeletal: Negative for back pain and myalgias.   Skin: Negative for color change and rash.   Allergic/Immunologic: Negative for food allergies and immunocompromised state.   Neurological: Negative for dizziness, syncope and headaches.   Hematological: Negative for adenopathy. Does not bruise/bleed easily.   Psychiatric/Behavioral: Negative for hallucinations and  "suicidal ideas. The patient is not nervous/anxious.        /80   Pulse 88   Temp 97.7 °F (36.5 °C) (Temporal)   Ht 175.3 cm (69\")   Wt 97.5 kg (215 lb)   SpO2 98%   BMI 31.75 kg/m²     Physical Exam  Vitals signs and nursing note reviewed.   Constitutional:       Appearance: Normal appearance. He is well-developed.   HENT:      Head: Normocephalic and atraumatic.      Right Ear: Tympanic membrane normal.      Left Ear: Tympanic membrane normal.      Nose: Nose normal.      Mouth/Throat:      Mouth: Mucous membranes are moist.      Pharynx: No oropharyngeal exudate or posterior oropharyngeal erythema.   Eyes:      Extraocular Movements: Extraocular movements intact.      Conjunctiva/sclera: Conjunctivae normal.   Neck:      Musculoskeletal: Normal range of motion and neck supple.      Thyroid: No thyromegaly.      Trachea: No tracheal deviation.   Cardiovascular:      Rate and Rhythm: Normal rate and regular rhythm.      Heart sounds: Normal heart sounds. No murmur.   Pulmonary:      Effort: Pulmonary effort is normal. No respiratory distress.      Breath sounds: Wheezing present.      Comments: Short quick inhalations noted  Abdominal:      General: Bowel sounds are normal.      Palpations: Abdomen is soft.      Tenderness: There is no abdominal tenderness. There is no guarding.   Musculoskeletal: Normal range of motion.         General: No tenderness.   Lymphadenopathy:      Cervical: No cervical adenopathy.   Skin:     General: Skin is warm and dry.      Findings: No rash.   Neurological:      General: No focal deficit present.      Mental Status: He is alert and oriented to person, place, and time.   Psychiatric:         Mood and Affect: Mood normal.         Behavior: Behavior normal.         Thought Content: Thought content normal.         Assessment/Plan     Diagnoses and all orders for this visit:    1. Fever, unspecified fever cause (Primary)  Comments:  COVID-19-like symptoms  Test for Covid by " nasal swab today  Symptomatic care advised with fluids, zinc and vitamin C  Start azithromycin, albuterol, prednisone  Orders:  -     COVID-19 PCR, [facility], NP SWAB IN [facility]IA 24-30 HR TAT - Swab, Nasopharynx; Future  -     azithromycin (Zithromax) 250 MG tablet; Take 2 tablets the first day, then 1 tablet daily for 4 days.  Dispense: 6 tablet; Refill: 0  -     predniSONE (DELTASONE) 20 MG tablet; 2 daily  Dispense: 10 tablet; Refill: 0  -     XR Chest 2 View  -     COVID-19 PCR, [facility], NP SWAB IN [facility] MEDIA 24-30 HR TAT - Swab, Nasopharynx    2. Shortness of breath  Comments:  Refer to pulmonology for further evaluation and treatment  We discussed that he likely needs a pulmonary function test to assess for possible asthma versus COPD  Orders:  -     Ambulatory Referral to Pulmonology  -     XR Chest 2 View    Fever  Advised regarding symptomatic treatment.  Suggested OTC remedies.  Antibiotic as per orders.  Encouraged to report if any worse or if any new symptoms.  Call in 5 days if symptoms aren't resolving.  Plan to empirically treat for pneumonia          This document has been electronically signed by:  Tracee Santos PA-C

## 2020-11-30 NOTE — PATIENT INSTRUCTIONS

## 2020-12-01 LAB — SARS-COV-2 RNA RESP QL NAA+PROBE: NOT DETECTED

## 2020-12-28 ENCOUNTER — PRIOR AUTHORIZATION (OUTPATIENT)
Dept: FAMILY MEDICINE CLINIC | Facility: CLINIC | Age: 40
End: 2020-12-28

## 2020-12-31 ENCOUNTER — OFFICE VISIT (OUTPATIENT)
Dept: FAMILY MEDICINE CLINIC | Facility: CLINIC | Age: 40
End: 2020-12-31

## 2020-12-31 VITALS
DIASTOLIC BLOOD PRESSURE: 70 MMHG | OXYGEN SATURATION: 97 % | BODY MASS INDEX: 30.96 KG/M2 | HEIGHT: 69 IN | WEIGHT: 209 LBS | HEART RATE: 78 BPM | SYSTOLIC BLOOD PRESSURE: 105 MMHG | TEMPERATURE: 97.1 F

## 2020-12-31 DIAGNOSIS — R31.9 HEMATURIA, UNSPECIFIED TYPE: Primary | ICD-10-CM

## 2020-12-31 DIAGNOSIS — Z80.0 FAMILY HISTORY OF PANCREATIC CANCER: ICD-10-CM

## 2020-12-31 DIAGNOSIS — R31.0 GROSS HEMATURIA: ICD-10-CM

## 2020-12-31 DIAGNOSIS — K21.9 GASTROESOPHAGEAL REFLUX DISEASE WITHOUT ESOPHAGITIS: ICD-10-CM

## 2020-12-31 DIAGNOSIS — R73.9 HYPERGLYCEMIA: ICD-10-CM

## 2020-12-31 DIAGNOSIS — E06.3 HYPOTHYROIDISM DUE TO HASHIMOTO'S THYROIDITIS: ICD-10-CM

## 2020-12-31 DIAGNOSIS — E03.8 HYPOTHYROIDISM DUE TO HASHIMOTO'S THYROIDITIS: ICD-10-CM

## 2020-12-31 DIAGNOSIS — E78.2 MIXED HYPERLIPIDEMIA: ICD-10-CM

## 2020-12-31 LAB
BILIRUB BLD-MCNC: NEGATIVE MG/DL
CLARITY, POC: ABNORMAL
COLOR UR: YELLOW
GLUCOSE UR STRIP-MCNC: NEGATIVE MG/DL
KETONES UR QL: NEGATIVE
LEUKOCYTE EST, POC: NEGATIVE
NITRITE UR-MCNC: NEGATIVE MG/ML
PH UR: 6 [PH] (ref 5–8)
PROT UR STRIP-MCNC: NEGATIVE MG/DL
RBC # UR STRIP: NEGATIVE /UL
SP GR UR: 1.03 (ref 1–1.03)
UROBILINOGEN UR QL: NORMAL

## 2020-12-31 PROCEDURE — 99214 OFFICE O/P EST MOD 30 MIN: CPT | Performed by: PHYSICIAN ASSISTANT

## 2020-12-31 RX ORDER — LEVOTHYROXINE SODIUM 0.12 MG/1
125 TABLET ORAL DAILY
Qty: 30 TABLET | Refills: 5 | Status: SHIPPED | OUTPATIENT
Start: 2020-12-31 | End: 2021-09-10

## 2020-12-31 RX ORDER — PANTOPRAZOLE SODIUM 40 MG/1
40 TABLET, DELAYED RELEASE ORAL DAILY
Qty: 30 TABLET | Refills: 2 | Status: SHIPPED | OUTPATIENT
Start: 2020-12-31 | End: 2021-02-09

## 2021-01-03 NOTE — PATIENT INSTRUCTIONS
"Fat and Cholesterol Restricted Eating Plan  Getting too much fat and cholesterol in your diet may cause health problems. Choosing the right foods helps keep your fat and cholesterol at normal levels. This can keep you from getting certain diseases.  Your doctor may recommend an eating plan that includes:  · Total fat: ______% or less of total calories a day.  · Saturated fat: ______% or less of total calories a day.  · Cholesterol: less than _________mg a day.  · Fiber: ______g a day.  What are tips for following this plan?  Meal planning  · At meals, divide your plate into four equal parts:  ? Fill one-half of your plate with vegetables and green salads.  ? Fill one-fourth of your plate with whole grains.  ? Fill one-fourth of your plate with low-fat (lean) protein foods.  · Eat fish that is high in omega-3 fats at least two times a week. This includes mackerel, tuna, sardines, and salmon.  · Eat foods that are high in fiber, such as whole grains, beans, apples, broccoli, carrots, peas, and barley.  General tips    · Work with your doctor to lose weight if you need to.  · Avoid:  ? Foods with added sugar.  ? Fried foods.  ? Foods with partially hydrogenated oils.  · Limit alcohol intake to no more than 1 drink a day for nonpregnant women and 2 drinks a day for men. One drink equals 12 oz of beer, 5 oz of wine, or 1½ oz of hard liquor.  Reading food labels  · Check food labels for:  ? Trans fats.  ? Partially hydrogenated oils.  ? Saturated fat (g) in each serving.  ? Cholesterol (mg) in each serving.  ? Fiber (g) in each serving.  · Choose foods with healthy fats, such as:  ? Monounsaturated fats.  ? Polyunsaturated fats.  ? Omega-3 fats.  · Choose grain products that have whole grains. Look for the word \"whole\" as the first word in the ingredient list.  Cooking  · Cook foods using low-fat methods. These include baking, boiling, grilling, and broiling.  · Eat more home-cooked foods. Eat at restaurants and buffets " less often.  · Avoid cooking using saturated fats, such as butter, cream, palm oil, palm kernel oil, and coconut oil.  Recommended foods    Fruits  · All fresh, canned (in natural juice), or frozen fruits.  Vegetables  · Fresh or frozen vegetables (raw, steamed, roasted, or grilled). Green salads.  Grains  · Whole grains, such as whole wheat or whole grain breads, crackers, cereals, and pasta. Unsweetened oatmeal, bulgur, barley, quinoa, or brown rice. Corn or whole wheat flour tortillas.  Meats and other protein foods  · Ground beef (85% or leaner), grass-fed beef, or beef trimmed of fat. Skinless chicken or turkey. Ground chicken or turkey. Pork trimmed of fat. All fish and seafood. Egg whites. Dried beans, peas, or lentils. Unsalted nuts or seeds. Unsalted canned beans. Nut butters without added sugar or oil.  Dairy  · Low-fat or nonfat dairy products, such as skim or 1% milk, 2% or reduced-fat cheeses, low-fat and fat-free ricotta or cottage cheese, or plain low-fat and nonfat yogurt.  Fats and oils  · Tub margarine without trans fats. Light or reduced-fat mayonnaise and salad dressings. Avocado. Olive, canola, sesame, or safflower oils.  The items listed above may not be a complete list of foods and beverages you can eat. Contact a dietitian for more information.  Foods to avoid  Fruits  · Canned fruit in heavy syrup. Fruit in cream or butter sauce. Fried fruit.  Vegetables  · Vegetables cooked in cheese, cream, or butter sauce. Fried vegetables.  Grains  · White bread. White pasta. White rice. Cornbread. Bagels, pastries, and croissants. Crackers and snack foods that contain trans fat and hydrogenated oils.  Meats and other protein foods  · Fatty cuts of meat. Ribs, chicken wings, jean, sausage, bologna, salami, chitterlings, fatback, hot dogs, bratwurst, and packaged lunch meats. Liver and organ meats. Whole eggs and egg yolks. Chicken and turkey with skin. Fried meat.  Dairy  · Whole or 2% milk, cream,  half-and-half, and cream cheese. Whole milk cheeses. Whole-fat or sweetened yogurt. Full-fat cheeses. Nondairy creamers and whipped toppings. Processed cheese, cheese spreads, and cheese curds.  Beverages  · Alcohol. Sugar-sweetened drinks such as sodas, lemonade, and fruit drinks.  Fats and oils  · Butter, stick margarine, lard, shortening, ghee, or jean fat. Coconut, palm kernel, and palm oils.  Sweets and desserts  · Corn syrup, sugars, honey, and molasses. Candy. Jam and jelly. Syrup. Sweetened cereals. Cookies, pies, cakes, donuts, muffins, and ice cream.  The items listed above may not be a complete list of foods and beverages you should avoid. Contact a dietitian for more information.  Summary  · Choosing the right foods helps keep your fat and cholesterol at normal levels. This can keep you from getting certain diseases.  · At meals, fill one-half of your plate with vegetables and green salads.  · Eat high-fiber foods, like whole grains, beans, apples, carrots, peas, and barley.  · Limit added sugar, saturated fats, alcohol, and fried foods.  This information is not intended to replace advice given to you by your health care provider. Make sure you discuss any questions you have with your health care provider.  Document Revised: 08/21/2019 Document Reviewed: 09/04/2018  [employer] Patient Education © 2020 [employer].      Carbohydrate Counting for Diabetes Mellitus, Adult    Carbohydrate counting is a method of keeping track of how many carbohydrates you eat. Eating carbohydrates naturally increases the amount of sugar (glucose) in the blood. Counting how many carbohydrates you eat helps keep your blood glucose within normal limits, which helps you manage your diabetes (diabetes mellitus).  It is important to know how many carbohydrates you can safely have in each meal. This is different for every person. A diet and nutrition specialist (registered dietitian) can help you make a meal plan and calculate how  "many carbohydrates you should have at each meal and snack.  Carbohydrates are found in the following foods:  · Grains, such as breads and cereals.  · Dried beans and soy products.  · Starchy vegetables, such as potatoes, peas, and corn.  · Fruit and fruit juices.  · Milk and yogurt.  · Sweets and snack foods, such as cake, cookies, candy, chips, and soft drinks.  How do I count carbohydrates?  There are two ways to count carbohydrates in food. You can use either of the methods or a combination of both.  Reading \"Nutrition Facts\" on packaged food  The \"Nutrition Facts\" list is included on the labels of almost all packaged foods and beverages in the [place] It includes:  · The serving size.  · Information about nutrients in each serving, including the grams (g) of carbohydrate per serving.  To use the “Nutrition Facts\":  · Decide how many servings you will have.  · Multiply the number of servings by the number of carbohydrates per serving.  · The resulting number is the total amount of carbohydrates that you will be having.  Learning standard serving sizes of other foods  When you eat carbohydrate foods that are not packaged or do not include \"Nutrition Facts\" on the label, you need to measure the servings in order to count the amount of carbohydrates:  · Measure the foods that you will eat with a food scale or measuring cup, if needed.  · Decide how many standard-size servings you will eat.  · Multiply the number of servings by 15. Most carbohydrate-rich foods have about 15 g of carbohydrates per serving.  ? For example, if you eat 8 oz (170 g) of strawberries, you will have eaten 2 servings and 30 g of carbohydrates (2 servings x 15 g = 30 g).  · For foods that have more than one food mixed, such as soups and casseroles, you must count the carbohydrates in each food that is included.  The following list contains standard serving sizes of common carbohydrate-rich foods. Each of these servings has about 15 g of " carbohydrates:  · ½ hamburger bun or ½ English muffin.  · ½ oz (15 mL) syrup.  · ½ oz (14 g) jelly.  · 1 slice of bread.  · 1 six-inch tortilla.  · 3 oz (85 g) cooked rice or pasta.  · 4 oz (113 g) cooked dried beans.  · 4 oz (113 g) starchy vegetable, such as peas, corn, or potatoes.  · 4 oz (113 g) hot cereal.  · 4 oz (113 g) mashed potatoes or ¼ of a large baked potato.  · 4 oz (113 g) canned or frozen fruit.  · 4 oz (120 mL) fruit juice.  · 4-6 crackers.  · 6 chicken nuggets.  · 6 oz (170 g) unsweetened dry cereal.  · 6 oz (170 g) plain fat-free yogurt or yogurt sweetened with artificial sweeteners.  · 8 oz (240 mL) milk.  · 8 oz (170 g) fresh fruit or one small piece of fruit.  · 24 oz (680 g) popped popcorn.  Example of carbohydrate counting  Sample meal  · 3 oz (85 g) chicken breast.  · 6 oz (170 g) brown rice.  · 4 oz (113 g) corn.  · 8 oz (240 mL) milk.  · 8 oz (170 g) strawberries with sugar-free whipped topping.  Carbohydrate calculation  1. Identify the foods that contain carbohydrates:  ? Rice.  ? Corn.  ? Milk.  ? Strawberries.  2. Calculate how many servings you have of each food:  ? 2 servings rice.  ? 1 serving corn.  ? 1 serving milk.  ? 1 serving strawberries.  3. Multiply each number of servings by 15 g:  ? 2 servings rice x 15 g = 30 g.  ? 1 serving corn x 15 g = 15 g.  ? 1 serving milk x 15 g = 15 g.  ? 1 serving strawberries x 15 g = 15 g.  4. Add together all of the amounts to find the total grams of carbohydrates eaten:  ? 30 g + 15 g + 15 g + 15 g = 75 g of carbohydrates total.  Summary  · Carbohydrate counting is a method of keeping track of how many carbohydrates you eat.  · Eating carbohydrates naturally increases the amount of sugar (glucose) in the blood.  · Counting how many carbohydrates you eat helps keep your blood glucose within normal limits, which helps you manage your diabetes.  · A diet and nutrition specialist (registered dietitian) can help you make a meal plan and calculate  how many carbohydrates you should have at each meal and snack.  This information is not intended to replace advice given to you by your health care provider. Make sure you discuss any questions you have with your health care provider.  Document Revised: 07/12/2018 Document Reviewed: 05/31/2017  Elsevier Patient Education © 2020 Elsevier Inc.

## 2021-01-05 ENCOUNTER — TELEPHONE (OUTPATIENT)
Dept: FAMILY MEDICINE CLINIC | Facility: CLINIC | Age: 41
End: 2021-01-05

## 2021-01-05 NOTE — TELEPHONE ENCOUNTER
PATIENT REQUESTING PRESCRIPTION ORDER FOR PREDNISONE.      PATIENT HAVING PAIN RIGHT-SIDE HIP WHERE HIP REPLACEMENT WAS DONE.      PLEASE ADVISE:  [phone]        PHARMACY:     [id number] [address] AT [facility] [address] - [phone]  - [phone]   [phone]

## 2021-01-06 ENCOUNTER — OFFICE VISIT (OUTPATIENT)
Dept: FAMILY MEDICINE CLINIC | Facility: CLINIC | Age: 41
End: 2021-01-06

## 2021-01-06 ENCOUNTER — TELEPHONE (OUTPATIENT)
Dept: FAMILY MEDICINE CLINIC | Facility: CLINIC | Age: 41
End: 2021-01-06

## 2021-01-06 VITALS — BODY MASS INDEX: 30.96 KG/M2 | WEIGHT: 209 LBS | HEIGHT: 69 IN

## 2021-01-06 DIAGNOSIS — J30.9 ALLERGIC RHINITIS, UNSPECIFIED SEASONALITY, UNSPECIFIED TRIGGER: Primary | ICD-10-CM

## 2021-01-06 DIAGNOSIS — R05.9 COUGH: ICD-10-CM

## 2021-01-06 PROCEDURE — 99442 PR PHYS/QHP TELEPHONE EVALUATION 11-20 MIN: CPT | Performed by: NURSE PRACTITIONER

## 2021-01-06 RX ORDER — AZELASTINE 1 MG/ML
SPRAY, METERED NASAL
Qty: 1 EACH | Refills: 2 | Status: SHIPPED | OUTPATIENT
Start: 2021-01-06 | End: 2021-08-12

## 2021-01-06 NOTE — PROGRESS NOTES
You have chosen to receive care through a telephone visit. Do you consent to use a telephone visit for your medical care today? Yes  Nick Christian is a 40 y.o. male who is c/o upper respiratory symptoms.      URI   The current episode started in the past 7 days. The problem has been waxing and waning. There has been no fever. Associated symptoms include congestion, coughing, ear pain, rhinorrhea and wheezing. Pertinent negatives include no chest pain, nausea, rash, sinus pain, sore throat or vomiting. He has tried sleep for the symptoms.      The following portions of the patient's history were reviewed and updated as appropriate: allergies, current medications, past family history, past medical history, past social history, past surgical history and problem list.    Current Outpatient Medications:   •  albuterol (PROVENTIL) (2.5 MG/3ML) 0.083% nebulizer solution, Take 2.5 mg by nebulization Every 6 (Six) Hours As Needed for Wheezing., Disp: 150 mL, Rfl: 2  •  albuterol sulfate  (90 Base) MCG/ACT inhaler, INHALE 2 PUFFS PO QID PRN, Disp: , Rfl:   •  amitriptyline (ELAVIL) 10 MG tablet, Take 1-2 tablets by mouth Every Night., Disp: 60 tablet, Rfl: 11  •  levothyroxine (SYNTHROID, LEVOTHROID) 125 MCG tablet, Take 1 tablet by mouth Daily., Disp: 30 tablet, Rfl: 5  •  pantoprazole (PROTONIX) 40 MG EC tablet, Take 1 tablet by mouth Daily., Disp: 30 tablet, Rfl: 2  •  topiramate (Topamax) 25 MG tablet, Take one tablet twice a day for one week, then two tablets twice a day, Disp: 120 tablet, Rfl: 3  •  azelastine (ASTELIN) 0.1 % nasal spray, 2 sprays both nostrils twice daily as needed, Disp: 1 each, Rfl: 2    Allergies   Allergen Reactions   • Morphine Mental Status Change       Review of Systems   Constitutional: Positive for fatigue. Negative for activity change, appetite change, chills and fever.   HENT: Positive for congestion, ear pain, postnasal drip, rhinorrhea and sinus pressure. Negative for sinus pain,  "sore throat, tinnitus and trouble swallowing.         Loss of taste/smell: Negative   Eyes: Negative for pain, discharge, redness and itching.   Respiratory: Positive for cough and wheezing. Negative for shortness of breath.         Does have an Albuterol inhaler which he uses if needed for wheezing   Cardiovascular: Negative for chest pain and leg swelling.   Gastrointestinal: Negative for nausea and vomiting.   Skin: Negative for color change and rash.   Hematological: Negative for adenopathy.   Psychiatric/Behavioral: Negative for sleep disturbance.     Visit Vitals  Ht 175.3 cm (69\")   Wt 94.8 kg (209 lb)   BMI 30.86 kg/m²     Physical Exam  Constitutional:       General: He is not in acute distress.  Pulmonary:      Effort: No respiratory distress.   Neurological:      Mental Status: He is alert.   Psychiatric:         Mood and Affect: Mood normal.         Speech: Speech normal.         Behavior: Behavior is cooperative.         Cognition and Memory: Cognition normal.       Assessment/Plan   Diagnoses and all orders for this visit:    1. Allergic rhinitis, unspecified seasonality, unspecified trigger (Primary)  -     azelastine (ASTELIN) 0.1 % nasal spray; 2 sprays both nostrils twice daily as needed  Dispense: 1 each; Refill: 2    2. Cough  -     XR Chest 2 View; Future    Symptoms and treatment options discussed with Nick. At this time, he would like to postpone an antibiotic and try Astelin Nasal Spray. Chest xray ordered for cough. He reports he will have that completed tomorrow and results will be discussed with him when available. Counseled regarding supportive care measures. S/S of concern reviewed and he is to seek further medical evaluation if occur or if symptoms worsen or do not improve.   This visit has been scheduled as a phone visit to comply with patient safety concerns in accordance with CDC recommendations. Total time of discussion was 15 minutes.    This document has been electronically " signed by EMMETT Tracy, FNP-BC, CDE  January 6, 2021 15:12 EST

## 2021-01-06 NOTE — TELEPHONE ENCOUNTER
PATIENT CALLED AND SAID HE HAS CONGESTION, LIGHT FEVER, AND COUGHING WHILE LAYING DOWN; HE SAID HE HAS HAD COVD TESTS AND THEY ARE ALL NEGATIVE; I MADE AN APPT FOR Friday WITH [facility] BUT ADVISED THEY MAY CALL HIM TO GET HIM IN SOONER    ANGELINA: [phone]

## 2021-01-07 ENCOUNTER — HOSPITAL ENCOUNTER (OUTPATIENT)
Dept: GENERAL RADIOLOGY | Facility: HOSPITAL | Age: 41
Discharge: HOME | End: 2021-01-07
Admitting: PHYSICIAN ASSISTANT

## 2021-01-07 PROCEDURE — 71046 X-RAY EXAM CHEST 2 VIEWS: CPT | Performed by: RADIOLOGY

## 2021-01-07 PROCEDURE — 71046 X-RAY EXAM CHEST 2 VIEWS: CPT

## 2021-01-11 ENCOUNTER — TELEPHONE (OUTPATIENT)
Dept: FAMILY MEDICINE CLINIC | Facility: CLINIC | Age: 41
End: 2021-01-11

## 2021-01-11 NOTE — TELEPHONE ENCOUNTER
Pt is aware of this information.       ----- Message from EMMETT Hill sent at 1/9/2021  4:20 PM EST -----  Regarding: Chest xray results  Unsure if he received the results of his chest xray which he had done on 01/07. Both Emilia and I had one ordered for him. Please let him know  the IMPRESSION: was  No radiographic evidence of acute cardiac or pulmonary disease.    If he is not doing better he probably needs to have f/u at least by phone

## 2021-01-14 ENCOUNTER — OFFICE VISIT (OUTPATIENT)
Dept: FAMILY MEDICINE CLINIC | Facility: CLINIC | Age: 41
End: 2021-01-14

## 2021-01-14 VITALS — WEIGHT: 209 LBS | HEIGHT: 69 IN | BODY MASS INDEX: 30.96 KG/M2

## 2021-01-14 DIAGNOSIS — Z20.822 EXPOSURE TO COVID-19 VIRUS: ICD-10-CM

## 2021-01-14 DIAGNOSIS — R05.9 COUGH: Primary | ICD-10-CM

## 2021-01-14 DIAGNOSIS — J40 BRONCHITIS: ICD-10-CM

## 2021-01-14 PROCEDURE — 99441 PR PHYS/QHP TELEPHONE EVALUATION 5-10 MIN: CPT | Performed by: PHYSICIAN ASSISTANT

## 2021-01-14 RX ORDER — AZITHROMYCIN 250 MG/1
TABLET, FILM COATED ORAL
Qty: 6 TABLET | Refills: 0 | Status: SHIPPED | OUTPATIENT
Start: 2021-01-14 | End: 2021-02-12

## 2021-01-14 RX ORDER — ALBUTEROL SULFATE 2.5 MG/3ML
2.5 SOLUTION RESPIRATORY (INHALATION) EVERY 6 HOURS PRN
Qty: 150 ML | Refills: 2 | Status: SHIPPED | OUTPATIENT
Start: 2021-01-14 | End: 2022-05-26

## 2021-01-14 RX ORDER — PSEUDOEPHED/ACETAMINOPH/DIPHEN 30MG-500MG
TABLET ORAL
COMMUNITY
Start: 2021-01-11 | End: 2021-02-12

## 2021-01-14 RX ORDER — PREDNISONE 20 MG/1
TABLET ORAL
Qty: 10 TABLET | Refills: 0 | Status: SHIPPED | OUTPATIENT
Start: 2021-01-14 | End: 2021-01-24

## 2021-01-14 NOTE — PATIENT INSTRUCTIONS

## 2021-01-14 NOTE — PROGRESS NOTES
Siomara Christian is a 40 y.o. male.     Telephone Visit    You have chosen to receive care through a telephone visit. Do you consent to use a telephone visit for your medical care today? Yes    This visit has been rescheduled as a phone visit to comply with patient safety concerns in accordance with CDC recommendations. Total time of discussion was 9 minutes.    Chief Complaint -cough    History of Present Illness -      Cough-  He complains of productive cough, chest congestion, shortness of breath, body aches chills fever and cold sweats that began last night.  Patient's wife and both children tested positive for Covid 2 days ago.  Patient has been in quarantine with his family.  Minimal relief with Tylenol.    The following portions of the patient's history were reviewed and updated as appropriate: allergies, current medications, past family history, past medical history, past social history, past surgical history and problem list.    Review of Systems   Constitutional: Positive for activity change, appetite change, chills, fatigue and fever.   HENT: Positive for congestion. Negative for ear pain, sinus pressure and sore throat.    Eyes: Negative for pain and visual disturbance.   Respiratory: Positive for cough and shortness of breath. Negative for chest tightness.    Cardiovascular: Negative for chest pain and palpitations.   Gastrointestinal: Negative for abdominal pain, constipation, diarrhea, nausea and vomiting.   Endocrine: Negative for polydipsia and polyuria.   Genitourinary: Negative for dysuria and frequency.   Musculoskeletal: Negative for back pain and myalgias.   Skin: Negative for color change and rash.   Allergic/Immunologic: Negative for food allergies and immunocompromised state.   Neurological: Negative for dizziness, syncope and headaches.   Hematological: Negative for adenopathy. Does not bruise/bleed easily.   Psychiatric/Behavioral: Negative for hallucinations and suicidal ideas.  "The patient is not nervous/anxious.        Ht 175.3 cm (69\")   Wt 94.8 kg (209 lb) Comment: pt reported all vitals today  BMI 30.86 kg/m²     Physical Exam  Vitals signs and nursing note reviewed.   Neurological:      Mental Status: He is alert and oriented to person, place, and time.   Psychiatric:         Mood and Affect: Mood normal.         Thought Content: Thought content normal.         Assessment/Plan     Diagnoses and all orders for this visit:    1. Cough (Primary)  Comments:  Strong likelihood that this is COVID-19 due to recent exposure  Treat empirically for COVID-19  Orders:  -     azithromycin (Zithromax) 250 MG tablet; 2 tabs on day 1 then 1 tab qd on days 2-5  Dispense: 6 tablet; Refill: 0    2. Exposure to COVID-19 virus  Comments:  Patient declines testing as he has been in close contact with wife and children who are all 3+ for Covid  Orders:  -     predniSONE (DELTASONE) 20 MG tablet; 2 daily  Dispense: 10 tablet; Refill: 0    3. Bronchitis  Comments:  Likely secondary to COVID-19  Symptomatic care advised  Empirically treat with azithromycin, prednisone, and albuterol nebs  Orders:  -     albuterol (PROVENTIL) (2.5 MG/3ML) 0.083% nebulizer solution; Take 2.5 mg by nebulization Every 6 (Six) Hours As Needed for Wheezing.  Dispense: 150 mL; Refill: 2    Patient advised to return for reevaluation within 5 days of symptoms change or worsen.  Patient advised to quarantine for 10 days from 1/13/2021 which was his first day of symptoms.  Patient advised to contact any close contacts within the last few days so that they may quarantine to help prevent spread of Covid.      This document has been electronically signed by:  Tracee Santos PA-C    "

## 2021-02-09 ENCOUNTER — TELEPHONE (OUTPATIENT)
Dept: FAMILY MEDICINE CLINIC | Facility: CLINIC | Age: 41
End: 2021-02-09

## 2021-02-09 RX ORDER — DEXLANSOPRAZOLE 60 MG/1
60 CAPSULE, DELAYED RELEASE ORAL DAILY
Qty: 30 CAPSULE | Refills: 5 | Status: SHIPPED | OUTPATIENT
Start: 2021-02-09 | End: 2021-02-12

## 2021-02-09 NOTE — TELEPHONE ENCOUNTER
CALLER:      ANGELINA MURPHY    CONTACT INFORMATION:    [phone]     REQUESTED ORDER FOR UROLOGIST VISIT TO BE RESET    MEDICATION REORDER FOR:    albuterol sulfate  (90 Base) MCG/ACT inhaler    MEDICATION NOT EFFECTIVE:    pantoprazole (PROTONIX) 40 MG EC tablet  40 mg, Daily       PATIENT INQUIRED ABOUT MEDICINE:    DEXILANT TO REPLACE PANTOPRAZOLE FOR ACID REFLUX    PATIENT IS OUT OF ACID REFLUX MEDICINE    JÚNIOR ROBIN

## 2021-02-10 RX ORDER — ALBUTEROL SULFATE 90 UG/1
2 AEROSOL, METERED RESPIRATORY (INHALATION)
Qty: 18 G | Refills: 0 | Status: SHIPPED | OUTPATIENT
Start: 2021-02-10 | End: 2021-03-05 | Stop reason: SDUPTHER

## 2021-02-12 ENCOUNTER — OFFICE VISIT (OUTPATIENT)
Dept: FAMILY MEDICINE CLINIC | Facility: CLINIC | Age: 41
End: 2021-02-12

## 2021-02-12 ENCOUNTER — OFFICE VISIT (OUTPATIENT)
Dept: UROLOGY | Facility: CLINIC | Age: 41
End: 2021-02-12

## 2021-02-12 VITALS — HEIGHT: 69 IN | WEIGHT: 197.97 LBS | BODY MASS INDEX: 29.32 KG/M2 | TEMPERATURE: 98.3 F

## 2021-02-12 VITALS
OXYGEN SATURATION: 97 % | HEIGHT: 69 IN | SYSTOLIC BLOOD PRESSURE: 130 MMHG | HEART RATE: 121 BPM | TEMPERATURE: 96.9 F | WEIGHT: 198 LBS | BODY MASS INDEX: 29.33 KG/M2 | DIASTOLIC BLOOD PRESSURE: 90 MMHG

## 2021-02-12 DIAGNOSIS — R31.9 HEMATURIA, UNSPECIFIED TYPE: Primary | ICD-10-CM

## 2021-02-12 DIAGNOSIS — N45.1 EPIDIDYMITIS: ICD-10-CM

## 2021-02-12 DIAGNOSIS — R36.1 HEMATOSPERMIA: Primary | ICD-10-CM

## 2021-02-12 DIAGNOSIS — N41.0 ACUTE PROSTATITIS: ICD-10-CM

## 2021-02-12 DIAGNOSIS — R36.1 HEMATOSPERMIA: ICD-10-CM

## 2021-02-12 LAB
BILIRUB BLD-MCNC: NEGATIVE MG/DL
CLARITY, POC: CLEAR
COLOR UR: YELLOW
GLUCOSE UR STRIP-MCNC: NEGATIVE MG/DL
KETONES UR QL: NEGATIVE
LEUKOCYTE EST, POC: ABNORMAL
NITRITE UR-MCNC: POSITIVE MG/ML
PH UR: 6 [PH] (ref 5–8)
PROT UR STRIP-MCNC: NEGATIVE MG/DL
RBC # UR STRIP: NEGATIVE /UL
SP GR UR: 1.01 (ref 1–1.03)
UROBILINOGEN UR QL: NORMAL

## 2021-02-12 PROCEDURE — 99204 OFFICE O/P NEW MOD 45 MIN: CPT | Performed by: UROLOGY

## 2021-02-12 PROCEDURE — 99213 OFFICE O/P EST LOW 20 MIN: CPT | Performed by: PHYSICIAN ASSISTANT

## 2021-02-12 PROCEDURE — 87088 URINE BACTERIA CULTURE: CPT | Performed by: UROLOGY

## 2021-02-12 PROCEDURE — 87186 SC STD MICRODIL/AGAR DIL: CPT | Performed by: UROLOGY

## 2021-02-12 PROCEDURE — 87086 URINE CULTURE/COLONY COUNT: CPT | Performed by: UROLOGY

## 2021-02-12 RX ORDER — SULFAMETHOXAZOLE AND TRIMETHOPRIM 800; 160 MG/1; MG/1
1 TABLET ORAL 2 TIMES DAILY
Qty: 84 TABLET | Refills: 2 | Status: SHIPPED | OUTPATIENT
Start: 2021-02-12 | End: 2021-02-19 | Stop reason: CLARIF

## 2021-02-12 RX ORDER — PROMETHAZINE HYDROCHLORIDE 25 MG/1
25 TABLET ORAL EVERY 6 HOURS PRN
Qty: 21 TABLET | Refills: 2 | Status: SHIPPED | OUTPATIENT
Start: 2021-02-12 | End: 2021-08-12

## 2021-02-12 RX ORDER — HYDROCODONE BITARTRATE AND ACETAMINOPHEN 10; 325 MG/1; MG/1
1 TABLET ORAL EVERY 6 HOURS PRN
Qty: 10 TABLET | Refills: 0 | Status: SHIPPED | OUTPATIENT
Start: 2021-02-12 | End: 2021-06-11

## 2021-02-12 NOTE — PROGRESS NOTES
"Siomara Christian is a 40 y.o. male.       Chief Complaint -bloody ejaculate    History of Present Illness -    ROS    Bloody ejaculate-  Patient complains of bloody ejaculate and moderate to severe throbbing left testicular pain.  Onset approximately 1 week ago.  No known specific injury.    The following portions of the patient's history were reviewed and updated as appropriate: allergies, current medications, past family history, past medical history, past social history, past surgical history and problem list.    Review of Systems   Constitutional: Negative for activity change, appetite change, fatigue and fever.   HENT: Negative for ear pain, sinus pressure and sore throat.    Eyes: Negative for pain and visual disturbance.   Respiratory: Negative for cough and chest tightness.    Cardiovascular: Negative for chest pain and palpitations.   Gastrointestinal: Negative for abdominal pain, constipation, diarrhea, nausea and vomiting.   Endocrine: Negative for polydipsia and polyuria.   Genitourinary: Negative for dysuria and frequency.        Bloody ejaculate and left testicular pain   Musculoskeletal: Negative for back pain and myalgias.   Skin: Negative for color change and rash.   Allergic/Immunologic: Negative for food allergies and immunocompromised state.   Neurological: Negative for dizziness, syncope and headaches.   Hematological: Negative for adenopathy. Does not bruise/bleed easily.   Psychiatric/Behavioral: Negative for hallucinations and suicidal ideas. The patient is not nervous/anxious.        Objective  Vital signs:  /90   Pulse (!) 121   Temp 96.9 °F (36.1 °C) (Tympanic)   Ht 175.3 cm (69\")   Wt 89.8 kg (198 lb)   SpO2 97%   BMI 29.24 kg/m²     Physical Exam  Vitals signs and nursing note reviewed.   Constitutional:       Appearance: Normal appearance. He is well-developed.   Eyes:      Extraocular Movements: Extraocular movements intact.      Conjunctiva/sclera: Conjunctivae " normal.   Cardiovascular:      Rate and Rhythm: Normal rate and regular rhythm.      Heart sounds: Normal heart sounds. No murmur.   Pulmonary:      Effort: Pulmonary effort is normal. No respiratory distress.      Breath sounds: Normal breath sounds. No wheezing.   Musculoskeletal:         General: No tenderness.   Skin:     General: Skin is warm and dry.      Findings: No rash.   Neurological:      Mental Status: He is alert and oriented to person, place, and time.   Psychiatric:         Mood and Affect: Mood normal.         Behavior: Behavior normal.         Thought Content: Thought content normal.         Result Review :              Assessment/Plan     Diagnoses and all orders for this visit:    1. Hematospermia (Primary)  -     Ambulatory Referral to Urology    Discussed potential causes of hematospermia with the patient today including infection.  We have contacted Dr. Prince's office and the patient was directed to leave our office and go directly to the urology appointment for evaluation and treatment.      Follow Up     Patient was given instructions and counseling regarding his condition or for health maintenance advice. Please see specific information pulled into the AVS if appropriate      This document has been electronically signed by:  Tracee Santos PA-C

## 2021-02-12 NOTE — PROGRESS NOTES
"Chief Complaint:          Chief Complaint   Patient presents with   • Blood in Urine   • twisted testicle       HPI:   40 y.o. male referred emergently for a \"twisted testicle and hematuria\".  He has had 2 to 3 weeks of gross blood associated with hematospermia.  It is like he sitting on an egg.  He has some pain on the inner aspect of his thighs and his testes for 3 days.  Last night he is having more pain.  He has no fevers chills no nausea no vomiting no diarrhea he does carry the diagnosis of Arnold-Chiari malformation and has a lot of headaches related to this he also has Legg-Perthes disease and has had 2 right hip replacements anticipating a third he has a left epididymitis.  He has a positive cremasteric reflex bilaterally.  He has a good rectal tone and exquisitely tender prostate this is prostatitis.  His urinalysis was positive for white cells unable to go ahead and culture and empirically start him on Bactrim and Flomax.  I am to get a lower tract investigation.  I will follow up with him based on this.      Past Medical History:        Past Medical History:   Diagnosis Date   • Arnold-Chiari deformity (CMS/HCC)    • Arrhythmia    • Arthritis    • Coronary artery disease    • Perthes disease    • Sleep apnea    • Stroke (CMS/[facility])    • Thyroid disease          Current Meds:     Current Outpatient Medications   Medication Sig Dispense Refill   • albuterol (PROVENTIL) (2.5 MG/3ML) 0.083% nebulizer solution Take 2.5 mg by nebulization Every 6 (Six) Hours As Needed for Wheezing. 150 mL 2   • albuterol sulfate  (90 Base) MCG/ACT inhaler Inhale 2 puffs 4 (Four) Times a Day. 18 g 0   • azelastine (ASTELIN) 0.1 % nasal spray 2 sprays both nostrils twice daily as needed 1 each 2   • levothyroxine (SYNTHROID, LEVOTHROID) 125 MCG tablet Take 1 tablet by mouth Daily. 30 tablet 5   • topiramate (Topamax) 25 MG tablet Take one tablet twice a day for one week, then two tablets twice a day 120 tablet 3     No " current facility-administered medications for this visit.         Allergies:      Allergies   Allergen Reactions   • Morphine Mental Status Change        Past Surgical History:     Past Surgical History:   Procedure Laterality Date   • GALLBLADDER SURGERY     • HERNIA REPAIR     • HIP BIPOLAR REPLACEMENT           Social History:     Social History     Socioeconomic History   • Marital status:      Spouse name: Not on file   • Number of children: Not on file   • Years of education: Not on file   • Highest education level: Not on file   Tobacco Use   • Smoking status: Former Smoker     Packs/day: 1.00     Types: Cigarettes     Quit date:      Years since quittin.1   • Smokeless tobacco: Former User     Types: Chew   Substance and Sexual Activity   • Alcohol use: Yes     Comment: occas   • Drug use: No   • Sexual activity: Defer       Family History:     Family History   Problem Relation Age of Onset   • Heart disease Maternal Grandfather    • Diabetes Maternal Grandfather    • Heart disease Paternal Grandfather    • Stroke Paternal Grandfather    • Rheumatologic disease Mother    • Diabetes Mother    • Hypertension Mother    • Obesity Mother    • Asthma Mother    • Diabetes Father    • Cancer Father         PANCREATIC   • Sleep apnea Father    • Hypersomnolence Father    • Asthma Father    • Hypertension Maternal Grandmother    • Thyroid disease Paternal Grandmother        Review of Systems:     Review of Systems   Constitutional: Negative.    HENT: Negative.    Eyes: Negative.    Respiratory: Negative.    Cardiovascular: Negative.    Gastrointestinal: Negative.    Endocrine: Negative.    Genitourinary: Positive for difficulty urinating, dysuria, hematuria and testicular pain.   Musculoskeletal: Negative.    Allergic/Immunologic: Negative.    Neurological: Negative.    Hematological: Negative.    Psychiatric/Behavioral: Negative.        Physical Exam:     Physical Exam  Vitals signs and nursing  note reviewed.   Constitutional:       Appearance: He is well-developed.   HENT:      Head: Normocephalic and atraumatic.   Eyes:      Conjunctiva/sclera: Conjunctivae normal.      Pupils: Pupils are equal, round, and reactive to light.   Neck:      Musculoskeletal: Normal range of motion.   Cardiovascular:      Rate and Rhythm: Normal rate and regular rhythm.      Heart sounds: Normal heart sounds.   Pulmonary:      Effort: Pulmonary effort is normal.      Breath sounds: Normal breath sounds.   Abdominal:      General: Bowel sounds are normal.      Palpations: Abdomen is soft.   Genitourinary:     Comments: Soft nontender abdomen normal phallus bilaterally descended testes with positive cremasteric reflexes.  Left grade 2 epididymitis.  Good rectal tone and a tender prostate to palpation.  Musculoskeletal: Normal range of motion.   Skin:     General: Skin is warm and dry.   Neurological:      Mental Status: He is alert and oriented to person, place, and time.      Deep Tendon Reflexes: Reflexes are normal and symmetric.   Psychiatric:         Behavior: Behavior normal.         Thought Content: Thought content normal.         Judgment: Judgment normal.         I have reviewed the following portions of the patient's history: allergies, current medications, past family history, past medical history, past social history, past surgical history, problem list and ROS and confirm it's accurate.      Procedure:       Assessment/Plan:   Prostatitis-we discussed the differential diagnosis of prostatitis including the National Institutes of Health classification system I through IV.  I discussed that type I prostatitis is  acute bacterial prostatitis and an associated with high fevers typically hematuria and severe pain with voiding.  We discussed the fact that it necessitates 6 weeks of chronic antibiotics to be sure it doesn't turn into a chronic bacterial prostatitis that can have lifelong ramifications.  We discussed  National Institutes of Health type II chronic bacterial prostatitis.  We discussed the fact that this a chronic bacterial infection of the prostate that often requires suppressive antibiotics.  We discussed type III being related more to nonspecific urethritis as well as tight for being related to finding inflammation and a biopsy in the absence of symptomatology.  I discussed the diagnostic strategies including the VB 1 through 4 urine culture and discussed empiric therapy.  I emphasized that with the use of chronic antibiotics I strongly recommended the concomitant use of probiotics.  Additionally, we discussed the various antibiotics used and the risks and benefits associated with each particularly the use of long-term ciprofloxacin and the effect on joints and collagen in human beings.  Empirically start Septra pending the results of the culture  Epididymitis-we discussed the etiology of epididymitis from lifting heavy objects to the full spectrum of epididymoorchitis.  I discussed the staging and grading system including a stage I epididymitis meaning confined to the epididymis but in a separation between the epididymis and testicle grade 2 being a concretion of both layers were I can feel the difference and finally grade 3 with scrotal fixation of the skin we discussed the recommendation of warm soaks, elevation and antibiotics were indicated.  I discussed the indication for aggressive intervention such as the presence of an abscess in the presence of significant systemic symptomatology such as fevers and chills.  We also discussed the fact that the thickening and swelling may persist beyond the pain and that sometimes a prolonged course of antibiotics may be indicated finally we discussed ultimately there may be significant atrophy of the testicle after the episode of epididymitis and its important to watch closely for that  BPH: Discussed the pathophysiology of BPH and obstruction.  We discussed the static  and dynamic effect effects of BPH as well as using 5 alpha reductase inhibitors versus alpha blockade.  We discussed the indications for transurethral surgery as well.  And/ or other therapeutic options available including all of the newer techniques.  Start Flomax            Patient's Body mass index is 29.22 kg/m². BMI is above normal parameters. Recommendations include: educational material.              This document has been electronically signed by MIKE HERR MD February 12, 2021 12:36 EST

## 2021-02-14 LAB — BACTERIA SPEC AEROBE CULT: ABNORMAL

## 2021-02-15 PROBLEM — N41.0 ACUTE PROSTATITIS: Status: ACTIVE | Noted: 2021-02-15

## 2021-02-15 PROBLEM — N45.1 EPIDIDYMITIS: Status: ACTIVE | Noted: 2021-02-15

## 2021-02-16 ENCOUNTER — HOSPITAL ENCOUNTER (EMERGENCY)
Facility: HOSPITAL | Age: 41
Discharge: HOME | End: 2021-02-16
Attending: EMERGENCY MEDICINE | Admitting: EMERGENCY MEDICINE

## 2021-02-16 ENCOUNTER — APPOINTMENT (OUTPATIENT)
Dept: CT IMAGING | Facility: HOSPITAL | Age: 41
End: 2021-02-16

## 2021-02-16 VITALS
BODY MASS INDEX: 29.03 KG/M2 | HEIGHT: 69 IN | RESPIRATION RATE: 20 BRPM | DIASTOLIC BLOOD PRESSURE: 43 MMHG | SYSTOLIC BLOOD PRESSURE: 56 MMHG | HEART RATE: 137 BPM | WEIGHT: 196 LBS | OXYGEN SATURATION: 95 % | TEMPERATURE: 98.7 F

## 2021-02-16 DIAGNOSIS — N45.1 EPIDIDYMITIS: Primary | ICD-10-CM

## 2021-02-16 DIAGNOSIS — E86.0 DEHYDRATION: ICD-10-CM

## 2021-02-16 DIAGNOSIS — K52.9 ENTERITIS: ICD-10-CM

## 2021-02-16 LAB
6-ACETYL MORPHINE: NEGATIVE
ALBUMIN SERPL-MCNC: 4.36 G/DL (ref 3.5–5.2)
ALBUMIN/GLOB SERPL: 1.1 G/DL
ALP SERPL-CCNC: 79 U/L (ref 39–117)
ALT SERPL W P-5'-P-CCNC: 68 U/L (ref 1–41)
AMPHET+METHAMPHET UR QL: NEGATIVE
ANION GAP SERPL CALCULATED.3IONS-SCNC: 14.8 MMOL/L (ref 5–15)
AST SERPL-CCNC: 48 U/L (ref 1–40)
BACTERIA UR QL AUTO: ABNORMAL /HPF
BARBITURATES UR QL SCN: NEGATIVE
BASOPHILS # BLD AUTO: 0.04 10*3/MM3 (ref 0–0.2)
BASOPHILS NFR BLD AUTO: 0.5 % (ref 0–1.5)
BENZODIAZ UR QL SCN: NEGATIVE
BILIRUB SERPL-MCNC: 0.5 MG/DL (ref 0–1.2)
BILIRUB UR QL STRIP: ABNORMAL
BUN SERPL-MCNC: 18 MG/DL (ref 6–20)
BUN/CREAT SERPL: 12.4 (ref 7–25)
BUPRENORPHINE SERPL-MCNC: NEGATIVE NG/ML
CALCIUM SPEC-SCNC: 9.4 MG/DL (ref 8.6–10.5)
CANNABINOIDS SERPL QL: NEGATIVE
CHLORIDE SERPL-SCNC: 100 MMOL/L (ref 98–107)
CLARITY UR: ABNORMAL
CO2 SERPL-SCNC: 22.2 MMOL/L (ref 22–29)
COCAINE UR QL: NEGATIVE
COLOR UR: ABNORMAL
CREAT SERPL-MCNC: 1.45 MG/DL (ref 0.76–1.27)
CRP SERPL-MCNC: 4.03 MG/DL (ref 0–0.5)
D-LACTATE SERPL-SCNC: 1.3 MMOL/L (ref 0.5–2)
DEPRECATED RDW RBC AUTO: 42.5 FL (ref 37–54)
EOSINOPHIL # BLD AUTO: 0.05 10*3/MM3 (ref 0–0.4)
EOSINOPHIL NFR BLD AUTO: 0.6 % (ref 0.3–6.2)
ERYTHROCYTE [DISTWIDTH] IN BLOOD BY AUTOMATED COUNT: 13.7 % (ref 12.3–15.4)
ETHANOL BLD-MCNC: <10 MG/DL (ref 0–10)
ETHANOL UR QL: <0.01 %
FLUAV RNA RESP QL NAA+PROBE: NOT DETECTED
FLUBV RNA RESP QL NAA+PROBE: NOT DETECTED
GFR SERPL CREATININE-BSD FRML MDRD: 54 ML/MIN/1.73
GLOBULIN UR ELPH-MCNC: 3.9 GM/DL
GLUCOSE SERPL-MCNC: 110 MG/DL (ref 65–99)
GLUCOSE UR STRIP-MCNC: NEGATIVE MG/DL
HCT VFR BLD AUTO: 43 % (ref 37.5–51)
HGB BLD-MCNC: 13.9 G/DL (ref 13–17.7)
HGB UR QL STRIP.AUTO: ABNORMAL
HYALINE CASTS UR QL AUTO: ABNORMAL /LPF
IMM GRANULOCYTES # BLD AUTO: 0.02 10*3/MM3 (ref 0–0.05)
IMM GRANULOCYTES NFR BLD AUTO: 0.2 % (ref 0–0.5)
KETONES UR QL STRIP: NEGATIVE
LEUKOCYTE ESTERASE UR QL STRIP.AUTO: ABNORMAL
LYMPHOCYTES # BLD AUTO: 0.87 10*3/MM3 (ref 0.7–3.1)
LYMPHOCYTES NFR BLD AUTO: 10.6 % (ref 19.6–45.3)
MAGNESIUM SERPL-MCNC: 1.8 MG/DL (ref 1.6–2.6)
MCH RBC QN AUTO: 27.4 PG (ref 26.6–33)
MCHC RBC AUTO-ENTMCNC: 32.3 G/DL (ref 31.5–35.7)
MCV RBC AUTO: 84.8 FL (ref 79–97)
METHADONE UR QL SCN: NEGATIVE
MONOCYTES # BLD AUTO: 0.68 10*3/MM3 (ref 0.1–0.9)
MONOCYTES NFR BLD AUTO: 8.3 % (ref 5–12)
NEUTROPHILS NFR BLD AUTO: 6.58 10*3/MM3 (ref 1.7–7)
NEUTROPHILS NFR BLD AUTO: 79.8 % (ref 42.7–76)
NITRITE UR QL STRIP: NEGATIVE
NRBC BLD AUTO-RTO: 0 /100 WBC (ref 0–0.2)
OPIATES UR QL: NEGATIVE
OXYCODONE UR QL SCN: NEGATIVE
PCP UR QL SCN: NEGATIVE
PH UR STRIP.AUTO: <=5 [PH] (ref 5–8)
PLATELET # BLD AUTO: 255 10*3/MM3 (ref 140–450)
PMV BLD AUTO: 8.4 FL (ref 6–12)
POTASSIUM SERPL-SCNC: 3.8 MMOL/L (ref 3.5–5.2)
PROT SERPL-MCNC: 8.3 G/DL (ref 6–8.5)
PROT UR QL STRIP: ABNORMAL
RBC # BLD AUTO: 5.07 10*6/MM3 (ref 4.14–5.8)
RBC # UR: ABNORMAL /HPF
REF LAB TEST METHOD: ABNORMAL
SARS-COV-2 RNA RESP QL NAA+PROBE: NOT DETECTED
SODIUM SERPL-SCNC: 137 MMOL/L (ref 136–145)
SP GR UR STRIP: 1.03 (ref 1–1.03)
SQUAMOUS #/AREA URNS HPF: ABNORMAL /HPF
UROBILINOGEN UR QL STRIP: ABNORMAL
WBC # BLD AUTO: 8.24 10*3/MM3 (ref 3.4–10.8)
WBC UR QL AUTO: ABNORMAL /HPF

## 2021-02-16 PROCEDURE — 80307 DRUG TEST PRSMV CHEM ANLYZR: CPT | Performed by: EMERGENCY MEDICINE

## 2021-02-16 PROCEDURE — 86140 C-REACTIVE PROTEIN: CPT | Performed by: EMERGENCY MEDICINE

## 2021-02-16 PROCEDURE — 83735 ASSAY OF MAGNESIUM: CPT | Performed by: EMERGENCY MEDICINE

## 2021-02-16 PROCEDURE — 25010000002 CEFTRIAXONE: Performed by: EMERGENCY MEDICINE

## 2021-02-16 PROCEDURE — 83605 ASSAY OF LACTIC ACID: CPT | Performed by: EMERGENCY MEDICINE

## 2021-02-16 PROCEDURE — 74176 CT ABD & PELVIS W/O CONTRAST: CPT

## 2021-02-16 PROCEDURE — 99283 EMERGENCY DEPT VISIT LOW MDM: CPT

## 2021-02-16 PROCEDURE — 99284 EMERGENCY DEPT VISIT MOD MDM: CPT

## 2021-02-16 PROCEDURE — 87040 BLOOD CULTURE FOR BACTERIA: CPT | Performed by: EMERGENCY MEDICINE

## 2021-02-16 PROCEDURE — 82077 ASSAY SPEC XCP UR&BREATH IA: CPT | Performed by: EMERGENCY MEDICINE

## 2021-02-16 PROCEDURE — 74176 CT ABD & PELVIS W/O CONTRAST: CPT | Performed by: RADIOLOGY

## 2021-02-16 PROCEDURE — 96365 THER/PROPH/DIAG IV INF INIT: CPT

## 2021-02-16 PROCEDURE — 81001 URINALYSIS AUTO W/SCOPE: CPT | Performed by: EMERGENCY MEDICINE

## 2021-02-16 PROCEDURE — 80053 COMPREHEN METABOLIC PANEL: CPT | Performed by: EMERGENCY MEDICINE

## 2021-02-16 PROCEDURE — 87636 SARSCOV2 & INF A&B AMP PRB: CPT | Performed by: EMERGENCY MEDICINE

## 2021-02-16 PROCEDURE — 85025 COMPLETE CBC W/AUTO DIFF WBC: CPT | Performed by: EMERGENCY MEDICINE

## 2021-02-16 RX ORDER — PANTOPRAZOLE SODIUM 40 MG/1
40 TABLET, DELAYED RELEASE ORAL ONCE
Status: COMPLETED | OUTPATIENT
Start: 2021-02-16 | End: 2021-02-16

## 2021-02-16 RX ORDER — ACETAMINOPHEN 500 MG
1000 TABLET ORAL ONCE
Status: COMPLETED | OUTPATIENT
Start: 2021-02-16 | End: 2021-02-16

## 2021-02-16 RX ADMIN — ACETAMINOPHEN 1000 MG: 500 TABLET ORAL at 19:31

## 2021-02-16 RX ADMIN — SODIUM CHLORIDE 1000 ML: 9 INJECTION, SOLUTION INTRAVENOUS at 19:34

## 2021-02-16 RX ADMIN — SODIUM CHLORIDE 500 ML: 9 INJECTION, SOLUTION INTRAVENOUS at 21:25

## 2021-02-16 RX ADMIN — PANTOPRAZOLE SODIUM 40 MG: 40 TABLET, DELAYED RELEASE ORAL at 19:49

## 2021-02-16 RX ADMIN — SODIUM CHLORIDE 1000 ML: 9 INJECTION, SOLUTION INTRAVENOUS at 19:33

## 2021-02-16 RX ADMIN — CEFTRIAXONE 1 G: 1 INJECTION, POWDER, FOR SOLUTION INTRAMUSCULAR; INTRAVENOUS at 21:06

## 2021-02-17 NOTE — ED PROVIDER NOTES
Subjective   Patient presents to Er with fever      Fever  Temp source:  Subjective  Severity:  Moderate  Onset quality:  Gradual  Timing:  Constant  Chronicity:  Recurrent  Relieved by:  Acetaminophen  Worsened by:  Nothing  Associated symptoms: chills, nausea and vomiting        Review of Systems   Constitutional: Positive for activity change, chills, fatigue and fever.   HENT: Negative.    Eyes: Negative.    Respiratory: Negative.    Cardiovascular: Negative.    Gastrointestinal: Positive for nausea and vomiting.   Endocrine: Negative.    Genitourinary: Negative.    Musculoskeletal: Negative.    Skin: Negative.    Allergic/Immunologic: Negative.    Neurological: Negative.    Hematological: Negative.    Psychiatric/Behavioral: Negative.        Past Medical History:   Diagnosis Date   • Arnold-Chiari deformity (CMS/HCC)    • Arrhythmia    • Arthritis    • Coronary artery disease    • Perthes disease    • Sleep apnea    • Stroke (CMS/HCC)    • Thyroid disease        Allergies   Allergen Reactions   • Morphine Mental Status Change       Past Surgical History:   Procedure Laterality Date   • GALLBLADDER SURGERY     • HERNIA REPAIR     • HIP BIPOLAR REPLACEMENT  2019       Family History   Problem Relation Age of Onset   • Heart disease Maternal Grandfather    • Diabetes Maternal Grandfather    • Heart disease Paternal Grandfather    • Stroke Paternal Grandfather    • Rheumatologic disease Mother    • Diabetes Mother    • Hypertension Mother    • Obesity Mother    • Asthma Mother    • Diabetes Father    • Cancer Father         PANCREATIC   • Sleep apnea Father    • Hypersomnolence Father    • Asthma Father    • Hypertension Maternal Grandmother    • Thyroid disease Paternal Grandmother        Social History     Socioeconomic History   • Marital status:      Spouse name: Not on file   • Number of children: Not on file   • Years of education: Not on file   • Highest education level: Not on file   Tobacco Use   •  Smoking status: Former Smoker     Packs/day: 1.00     Types: Cigarettes     Quit date:      Years since quittin.1   • Smokeless tobacco: Former User     Types: Chew   Substance and Sexual Activity   • Alcohol use: Yes     Comment: occas   • Drug use: No   • Sexual activity: Defer           Objective   Physical Exam  Vitals signs and nursing note reviewed.   HENT:      Head: Normocephalic.      Right Ear: Tympanic membrane normal.      Left Ear: Tympanic membrane normal.      Nose: Nose normal.      Mouth/Throat:      Mouth: Mucous membranes are moist.   Eyes:      Pupils: Pupils are equal, round, and reactive to light.   Neck:      Musculoskeletal: Normal range of motion.   Cardiovascular:      Rate and Rhythm: Normal rate and regular rhythm.      Pulses: Normal pulses.   Pulmonary:      Effort: Pulmonary effort is normal.   Abdominal:      General: Abdomen is flat.   Musculoskeletal: Normal range of motion.   Skin:     General: Skin is warm.      Capillary Refill: Capillary refill takes less than 2 seconds.   Neurological:      General: No focal deficit present.      Mental Status: He is alert.   Psychiatric:         Mood and Affect: Mood normal.         Procedures           ED Course  ED Course as of 55   Tue  CT abdomen : enteritis, mild fatty liver    [KIM]      ED Course User Index  [KIM] Venkatesh Zhang MD                                           [facility]    Final diagnoses:   Epididymitis   Enteritis   Dehydration            Venkatesh Zhang MD  21

## 2021-02-19 ENCOUNTER — TELEPHONE (OUTPATIENT)
Dept: UROLOGY | Facility: CLINIC | Age: 41
End: 2021-02-19

## 2021-02-19 ENCOUNTER — OFFICE VISIT (OUTPATIENT)
Dept: UROLOGY | Facility: CLINIC | Age: 41
End: 2021-02-19

## 2021-02-19 VITALS — TEMPERATURE: 98.4 F | WEIGHT: 206 LBS | HEIGHT: 69 IN | BODY MASS INDEX: 30.51 KG/M2

## 2021-02-19 DIAGNOSIS — N45.1 EPIDIDYMITIS: Primary | ICD-10-CM

## 2021-02-19 PROCEDURE — 96372 THER/PROPH/DIAG INJ SC/IM: CPT | Performed by: UROLOGY

## 2021-02-19 PROCEDURE — 99024 POSTOP FOLLOW-UP VISIT: CPT | Performed by: UROLOGY

## 2021-02-19 RX ORDER — CIPROFLOXACIN 500 MG/1
1 TABLET, FILM COATED ORAL 2 TIMES DAILY
COMMUNITY
Start: 2021-02-16 | End: 2021-06-09 | Stop reason: SDUPTHER

## 2021-02-19 RX ORDER — DOXYCYCLINE HYCLATE 100 MG/1
100 CAPSULE ORAL 2 TIMES DAILY
Qty: 42 CAPSULE | Refills: 0 | Status: SHIPPED | OUTPATIENT
Start: 2021-02-19 | End: 2021-03-12

## 2021-02-19 RX ORDER — GENTAMICIN SULFATE 40 MG/ML
80 INJECTION, SOLUTION INTRAMUSCULAR; INTRAVENOUS ONCE
Status: COMPLETED | OUTPATIENT
Start: 2021-02-19 | End: 2021-02-19

## 2021-02-19 RX ADMIN — GENTAMICIN SULFATE 80 MG: 40 INJECTION, SOLUTION INTRAMUSCULAR; INTRAVENOUS at 13:50

## 2021-02-19 NOTE — PROGRESS NOTES
"Chief Complaint:          Chief Complaint   Patient presents with   • ER f/u     CT review       HPI:   40 y.o. male referred emergently for a \"twisted testicle and hematuria\".  He has had 2 to 3 weeks of gross blood associated with hematospermia.  It is like he sitting on an egg.  He has some pain on the inner aspect of his thighs and his testes for 3 days.  Last night he is having more pain.  He has no fevers chills no nausea no vomiting no diarrhea he does carry the diagnosis of Arnold-Chiari malformation and has a lot of headaches related to this he also has Legg-Perthes disease and has had 2 right hip replacements anticipating a third he has a left epididymitis.  He has a positive cremasteric reflex bilaterally.  He has a good rectal tone and exquisitely tender prostate this is prostatitis.  His urinalysis was positive for white cells unable to go ahead and culture and empirically start him on Bactrim and Flomax.  I am to get a lower tract investigation.  I will follow up with him based on this.  He returns today he had been to the emergency room on Thursday and at [facility] had a repeat ultrasound showing and confirming epididymitis.  It is actually exacerbated I pushed originally put him on Septra which was resistant and had E. coli in the urine then we switched him to Cipro and he still having symptomatology this is a very recalcitrant left epididymitis is actually progressed on physical exam to more of a grade 2 although there is no specific fixation I recommend continue local therapy and I changed him to doxycycline.  He does not want pain medication.  I will watch him very closely and see him back on Tuesday, February 23.    Past Medical History:        Past Medical History:   Diagnosis Date   • Arnold-Chiari deformity (CMS/HCC)    • Arrhythmia    • Arthritis    • Coronary artery disease    • Perthes disease    • Sleep apnea    • Stroke (CMS/HCC)    • Thyroid disease          Current Meds: "     Current Outpatient Medications   Medication Sig Dispense Refill   • albuterol (PROVENTIL) (2.5 MG/3ML) 0.083% nebulizer solution Take 2.5 mg by nebulization Every 6 (Six) Hours As Needed for Wheezing. 150 mL 2   • albuterol sulfate  (90 Base) MCG/ACT inhaler Inhale 2 puffs 4 (Four) Times a Day. 18 g 0   • azelastine (ASTELIN) 0.1 % nasal spray 2 sprays both nostrils twice daily as needed 1 each 2   • ciprofloxacin (CIPRO) 500 MG tablet Take 1 tablet by mouth 2 (two) times a day.     • HYDROcodone-acetaminophen (Norco)  MG per tablet Take 1 tablet by mouth Every 6 (Six) Hours As Needed for Moderate Pain . 10 tablet 0   • levothyroxine (SYNTHROID, LEVOTHROID) 125 MCG tablet Take 1 tablet by mouth Daily. 30 tablet 5   • promethazine (PHENERGAN) 25 MG tablet Take 1 tablet by mouth Every 6 (Six) Hours As Needed for Nausea or Vomiting. 21 tablet 2   • topiramate (Topamax) 25 MG tablet Take one tablet twice a day for one week, then two tablets twice a day 120 tablet 3     No current facility-administered medications for this visit.         Allergies:      Allergies   Allergen Reactions   • Morphine Mental Status Change        Past Surgical History:     Past Surgical History:   Procedure Laterality Date   • GALLBLADDER SURGERY     • HERNIA REPAIR     • HIP BIPOLAR REPLACEMENT           Social History:     Social History     Socioeconomic History   • Marital status:      Spouse name: Not on file   • Number of children: Not on file   • Years of education: Not on file   • Highest education level: Not on file   Tobacco Use   • Smoking status: Former Smoker     Packs/day: 1.00     Types: Cigarettes     Quit date:      Years since quittin.1   • Smokeless tobacco: Former User     Types: Chew   Substance and Sexual Activity   • Alcohol use: Yes     Comment: occas   • Drug use: No   • Sexual activity: Defer       Family History:     Family History   Problem Relation Age of Onset   • Heart disease  Maternal Grandfather    • Diabetes Maternal Grandfather    • Heart disease Paternal Grandfather    • Stroke Paternal Grandfather    • Rheumatologic disease Mother    • Diabetes Mother    • Hypertension Mother    • Obesity Mother    • Asthma Mother    • Diabetes Father    • Cancer Father         PANCREATIC   • Sleep apnea Father    • Hypersomnolence Father    • Asthma Father    • Hypertension Maternal Grandmother    • Thyroid disease Paternal Grandmother        Review of Systems:     Review of Systems   Constitutional: Negative.    HENT: Negative.    Eyes: Negative.    Respiratory: Negative.    Cardiovascular: Negative.    Gastrointestinal: Negative.    Endocrine: Negative.    Genitourinary: Positive for scrotal swelling and testicular pain.   Musculoskeletal: Negative.    Allergic/Immunologic: Negative.    Neurological: Negative.    Hematological: Negative.    Psychiatric/Behavioral: Negative.        Physical Exam:     Physical Exam  Vitals signs and nursing note reviewed.   Constitutional:       Appearance: He is well-developed.   HENT:      Head: Normocephalic and atraumatic.   Eyes:      Conjunctiva/sclera: Conjunctivae normal.      Pupils: Pupils are equal, round, and reactive to light.   Neck:      Musculoskeletal: Normal range of motion.   Cardiovascular:      Rate and Rhythm: Normal rate and regular rhythm.      Heart sounds: Normal heart sounds.   Pulmonary:      Effort: Pulmonary effort is normal.      Breath sounds: Normal breath sounds.   Abdominal:      General: Bowel sounds are normal.      Palpations: Abdomen is soft.   Genitourinary:     Comments: Progression of his left epididymitis from a grade 1-222-3 although there is no fixation of the scrotal skin he has a positive cremasteric reflex positive Prehn's sign.  Musculoskeletal: Normal range of motion.   Skin:     General: Skin is warm and dry.   Neurological:      Mental Status: He is alert and oriented to person, place, and time.      Deep Tendon  Reflexes: Reflexes are normal and symmetric.   Psychiatric:         Behavior: Behavior normal.         Thought Content: Thought content normal.         Judgment: Judgment normal.         I have reviewed the following portions of the patient's history: allergies, current medications, past family history, past medical history, past social history, past surgical history, problem list and ROS and confirm it's accurate.      Procedure:       Assessment/Plan:   Epididymitis-we discussed the etiology of epididymitis from lifting heavy objects to the full spectrum of epididymoorchitis.  I discussed the staging and grading system including a stage I epididymitis meaning confined to the epididymis but in a separation between the epididymis and testicle grade 2 being a concretion of both layers were I can feel the difference and finally grade 3 with scrotal fixation of the skin we discussed the recommendation of warm soaks, elevation and antibiotics were indicated.  I discussed the indication for aggressive intervention such as the presence of an abscess in the presence of significant systemic symptomatology such as fevers and chills.  We also discussed the fact that the thickening and swelling may persist beyond the pain and that sometimes a prolonged course of antibiotics may be indicated finally we discussed ultimately there may be significant atrophy of the testicle after the episode of epididymitis and its important to watch closely for that this is a resistant infection he originally was placed on Septra but it was is Cipro resistant E. coli and then changed to Cipro and finally to doxycycline            Patient's Body mass index is 30.42 kg/m². BMI is above normal parameters. Recommendations include: educational material.              This document has been electronically signed by MIKE HERR MD February 19, 2021 13:18 EST

## 2021-02-19 NOTE — TELEPHONE ENCOUNTER
I called the pt and left a message that he had an infection of e coli when we got his culture results back. I told him [facility] wanted him to take Macrobib 100 mg BID for 3 weeks--qt 42 no refills.    I called [facility] with the information

## 2021-02-21 LAB
BACTERIA SPEC AEROBE CULT: NORMAL
BACTERIA SPEC AEROBE CULT: NORMAL

## 2021-02-23 ENCOUNTER — OFFICE VISIT (OUTPATIENT)
Dept: UROLOGY | Facility: CLINIC | Age: 41
End: 2021-02-23

## 2021-02-23 VITALS — TEMPERATURE: 97.5 F | BODY MASS INDEX: 30.5 KG/M2 | HEIGHT: 69 IN | WEIGHT: 205.91 LBS

## 2021-02-23 DIAGNOSIS — N45.1 EPIDIDYMITIS: Primary | ICD-10-CM

## 2021-02-23 PROCEDURE — 99213 OFFICE O/P EST LOW 20 MIN: CPT | Performed by: UROLOGY

## 2021-02-23 NOTE — PROGRESS NOTES
Chief Complaint:          Chief Complaint   Patient presents with   • EPIDIDYMITIS       HPI:   41 y.o. male returns today has a history of a left-sided epididymoorchitis he originally was on Septra but he had resistance to.  And we changed him to doxycycline he is doing dramatically better the right testicle is normal left testis is decreased dramatically and there is no more fixation of the scrotal skin overall doing well I will see him back in 3 weeks he will finish out 14 additional days      Past Medical History:        Past Medical History:   Diagnosis Date   • Arnold-Chiari deformity (CMS/[facility])    • Arrhythmia    • Arthritis    • Coronary artery disease    • Perthes disease    • Sleep apnea    • Stroke (CMS/[facility])    • Thyroid disease          Current Meds:     Current Outpatient Medications   Medication Sig Dispense Refill   • albuterol (PROVENTIL) (2.5 MG/3ML) 0.083% nebulizer solution Take 2.5 mg by nebulization Every 6 (Six) Hours As Needed for Wheezing. 150 mL 2   • albuterol sulfate  (90 Base) MCG/ACT inhaler Inhale 2 puffs 4 (Four) Times a Day. 18 g 0   • azelastine (ASTELIN) 0.1 % nasal spray 2 sprays both nostrils twice daily as needed 1 each 2   • ciprofloxacin (CIPRO) 500 MG tablet Take 1 tablet by mouth 2 (two) times a day.     • doxycycline (VIBRAMYCIN) 100 MG capsule Take 1 capsule by mouth 2 (Two) Times a Day for 21 days. 42 capsule 0   • HYDROcodone-acetaminophen (Norco)  MG per tablet Take 1 tablet by mouth Every 6 (Six) Hours As Needed for Moderate Pain . 10 tablet 0   • levothyroxine (SYNTHROID, LEVOTHROID) 125 MCG tablet Take 1 tablet by mouth Daily. 30 tablet 5   • promethazine (PHENERGAN) 25 MG tablet Take 1 tablet by mouth Every 6 (Six) Hours As Needed for Nausea or Vomiting. 21 tablet 2   • topiramate (Topamax) 25 MG tablet Take one tablet twice a day for one week, then two tablets twice a day 120 tablet 3     No current facility-administered medications for this visit.          Allergies:      Allergies   Allergen Reactions   • Morphine Mental Status Change        Past Surgical History:     Past Surgical History:   Procedure Laterality Date   • GALLBLADDER SURGERY     • HERNIA REPAIR     • HIP BIPOLAR REPLACEMENT           Social History:     Social History     Socioeconomic History   • Marital status:      Spouse name: Not on file   • Number of children: Not on file   • Years of education: Not on file   • Highest education level: Not on file   Tobacco Use   • Smoking status: Former Smoker     Packs/day: 1.00     Types: Cigarettes     Quit date:      Years since quittin.1   • Smokeless tobacco: Former User     Types: Chew   Substance and Sexual Activity   • Alcohol use: Yes     Comment: occas   • Drug use: No   • Sexual activity: Defer       Family History:     Family History   Problem Relation Age of Onset   • Heart disease Maternal Grandfather    • Diabetes Maternal Grandfather    • Heart disease Paternal Grandfather    • Stroke Paternal Grandfather    • Rheumatologic disease Mother    • Diabetes Mother    • Hypertension Mother    • Obesity Mother    • Asthma Mother    • Diabetes Father    • Cancer Father         PANCREATIC   • Sleep apnea Father    • Hypersomnolence Father    • Asthma Father    • Hypertension Maternal Grandmother    • Thyroid disease Paternal Grandmother        Review of Systems:     Review of Systems   Constitutional: Negative.    HENT: Negative.    Eyes: Negative.    Respiratory: Negative.    Cardiovascular: Negative.    Gastrointestinal: Negative.    Endocrine: Negative.    Genitourinary: Positive for scrotal swelling and testicular pain.   Musculoskeletal: Negative.    Allergic/Immunologic: Negative.    Neurological: Negative.    Hematological: Negative.    Psychiatric/Behavioral: Negative.        Physical Exam:     Physical Exam  Vitals signs and nursing note reviewed.   Constitutional:       Appearance: He is well-developed.   HENT:      Head:  Normocephalic and atraumatic.   Eyes:      Conjunctiva/sclera: Conjunctivae normal.      Pupils: Pupils are equal, round, and reactive to light.   Neck:      Musculoskeletal: Normal range of motion.   Cardiovascular:      Rate and Rhythm: Normal rate and regular rhythm.      Heart sounds: Normal heart sounds.   Pulmonary:      Effort: Pulmonary effort is normal.      Breath sounds: Normal breath sounds.   Abdominal:      General: Bowel sounds are normal.      Palpations: Abdomen is soft.   Genitourinary:     Comments: Left grade 2 epididymoorchitis  Musculoskeletal: Normal range of motion.   Skin:     General: Skin is warm and dry.   Neurological:      Mental Status: He is alert and oriented to person, place, and time.      Deep Tendon Reflexes: Reflexes are normal and symmetric.   Psychiatric:         Behavior: Behavior normal.         Thought Content: Thought content normal.         Judgment: Judgment normal.         I have reviewed the following portions of the patient's history: allergies, current medications, past family history, past medical history, past social history, past surgical history, problem list and ROS and confirm it's accurate.      Procedure:       Assessment/Plan:   Epididymitis-we discussed the etiology of epididymitis from lifting heavy objects to the full spectrum of epididymoorchitis.  I discussed the staging and grading system including a stage I epididymitis meaning confined to the epididymis but in a separation between the epididymis and testicle grade 2 being a concretion of both layers were I can feel the difference and finally grade 3 with scrotal fixation of the skin we discussed the recommendation of warm soaks, elevation and antibiotics were indicated.  I discussed the indication for aggressive intervention such as the presence of an abscess in the presence of significant systemic symptomatology such as fevers and chills.  We also discussed the fact that the thickening and swelling  may persist beyond the pain and that sometimes a prolonged course of antibiotics may be indicated finally we discussed ultimately there may be significant atrophy of the testicle after the episode of epididymitis and its important to watch closely for that            Patient's Body mass index is 30.39 kg/m². BMI is above normal parameters. Recommendations include: educational material.              This document has been electronically signed by MIKE HERR MD February 23, 2021 13:50 EST

## 2021-03-05 RX ORDER — ALBUTEROL SULFATE 90 UG/1
AEROSOL, METERED RESPIRATORY (INHALATION)
Qty: 18 G | Refills: 0 | Status: SHIPPED | OUTPATIENT
Start: 2021-03-05 | End: 2022-05-26

## 2021-03-10 ENCOUNTER — OFFICE VISIT (OUTPATIENT)
Dept: CARDIOLOGY | Facility: CLINIC | Age: 41
End: 2021-03-10

## 2021-03-10 VITALS
BODY MASS INDEX: 30.36 KG/M2 | WEIGHT: 205 LBS | HEIGHT: 69 IN | DIASTOLIC BLOOD PRESSURE: 83 MMHG | OXYGEN SATURATION: 97 % | HEART RATE: 89 BPM | SYSTOLIC BLOOD PRESSURE: 132 MMHG | TEMPERATURE: 98.8 F

## 2021-03-10 DIAGNOSIS — Z01.818 PREOPERATIVE CLEARANCE: Primary | ICD-10-CM

## 2021-03-10 PROCEDURE — 99213 OFFICE O/P EST LOW 20 MIN: CPT | Performed by: PHYSICIAN ASSISTANT

## 2021-03-10 PROCEDURE — 93000 ELECTROCARDIOGRAM COMPLETE: CPT | Performed by: PHYSICIAN ASSISTANT

## 2021-03-10 NOTE — PROGRESS NOTES
Romero Moraes MD  Nick Christian  1980  03/10/2021    Patient Active Problem List   Diagnosis   • Healthcare maintenance   • Arnold-Chiari malformation (CMS/HCC)   • Hypothyroidism due to Hashimoto's thyroiditis   • Juvenile osteochondrosis of head of femur   • Legg-Perthes disease, right   • Periodic headache syndrome, not intractable   • Mixed hyperlipidemia   • Prediabetes   • JEMMA (obstructive sleep apnea)   • Obesity (BMI 30-39.9)   • Sleepiness   • Hyperglycemia   • Exposure to COVID-19 virus   • Hematospermia   • Acute prostatitis   • Epididymitis   • Preoperative clearance       Dear Romero Mroaes MD:    Subjective     History of Present Illness:    Chief Complaint   Patient presents with   • Med Management     Verbal.    • Surgical Clearance     right hip replacement.        Nick Christian is a pleasant 41 y.o. male with a past medical history significant for no known coronary artery disease or structural heart disease.  He also has nonhypertensive, nondiabetic, and non-smoker.  Patient comes in today for cardiac risk evaluation for total hip replacement due to leg calf Perthes disease.    Patient reports he is doing well clinically from cardiac standpoint has no complaints.  He denies any chest pains, shortness of breath, palpitations, dizziness, or syncope.  He reports he is required to have cardiac clearance prior to procedure due to history of being in what he tells me is acute heart failure roughly 12 years ago although no clinical documents are available to me at this time.  He has had no recent issues in regards to his cardiovascular health.    Allergies   Allergen Reactions   • Morphine Mental Status Change   :      Current Outpatient Medications:   •  albuterol (PROVENTIL) (2.5 MG/3ML) 0.083% nebulizer solution, Take 2.5 mg by nebulization Every 6 (Six) Hours As Needed for Wheezing., Disp: 150 mL, Rfl: 2  •  azelastine (ASTELIN) 0.1 % nasal spray, 2 sprays both nostrils twice  "daily as needed, Disp: 1 each, Rfl: 2  •  ciprofloxacin (CIPRO) 500 MG tablet, Take 1 tablet by mouth 2 (two) times a day., Disp: , Rfl:   •  doxycycline (VIBRAMYCIN) 100 MG capsule, Take 1 capsule by mouth 2 (Two) Times a Day for 21 days., Disp: 42 capsule, Rfl: 0  •  HYDROcodone-acetaminophen (Norco)  MG per tablet, Take 1 tablet by mouth Every 6 (Six) Hours As Needed for Moderate Pain ., Disp: 10 tablet, Rfl: 0  •  levothyroxine (SYNTHROID, LEVOTHROID) 125 MCG tablet, Take 1 tablet by mouth Daily., Disp: 30 tablet, Rfl: 5  •  promethazine (PHENERGAN) 25 MG tablet, Take 1 tablet by mouth Every 6 (Six) Hours As Needed for Nausea or Vomiting., Disp: 21 tablet, Rfl: 2  •  topiramate (Topamax) 25 MG tablet, Take one tablet twice a day for one week, then two tablets twice a day, Disp: 120 tablet, Rfl: 3  •  Ventolin  (90 Base) MCG/ACT inhaler, INHALE 2 PUFFS BY MOUTH FOUR TIMES DAILY, Disp: 18 g, Rfl: 0    The following portions of the patient's history were reviewed and updated as appropriate: allergies, current medications, past family history, past medical history, past social history, past surgical history and problem list.    Social History     Tobacco Use   • Smoking status: Former Smoker     Packs/day: 1.00     Types: Cigarettes     Quit date:      Years since quittin.1   • Smokeless tobacco: Former User     Types: Chew   Substance Use Topics   • Alcohol use: Yes     Comment: occas   • Drug use: No       ROS    Objective   Vitals:    03/10/21 1346   BP: 132/83   BP Location: Right arm   Patient Position: Sitting   Cuff Size: Adult   Pulse: 89   Temp: 98.8 °F (37.1 °C)   TempSrc: Infrared   SpO2: 97%   Weight: 93 kg (205 lb)   Height: 175.3 cm (69\")     Body mass index is 30.27 kg/m².    Constitutional:       General: Not in acute distress.     Appearance: Healthy appearance. Well-developed and not in distress. Not diaphoretic.   Eyes:      Conjunctiva/sclera: Conjunctivae normal.      " Pupils: Pupils are equal, round, and reactive to light.   HENT:      Head: Normocephalic and atraumatic.   Neck:      Vascular: No carotid bruit or JVD.   Pulmonary:      Effort: Pulmonary effort is normal. No respiratory distress.      Breath sounds: Normal breath sounds.   Cardiovascular:      Normal rate. Regular rhythm.   Skin:     General: Skin is cool.   Neurological:      Mental Status: Alert, oriented to person, place, and time and oriented to person, place and time.         Lab Results   Component Value Date     02/16/2021    K 3.8 02/16/2021     02/16/2021    CO2 22.2 02/16/2021    BUN 18 02/16/2021    CREATININE 1.45 (H) 02/16/2021    GLUCOSE 110 (H) 02/16/2021    CALCIUM 9.4 02/16/2021    AST 48 (H) 02/16/2021    ALT 68 (H) 02/16/2021    ALKPHOS 79 02/16/2021     No results found for: CKTOTAL  Lab Results   Component Value Date    WBC 8.24 02/16/2021    HGB 13.9 02/16/2021    HCT 43.0 02/16/2021     02/16/2021     No results found for: INR  Lab Results   Component Value Date    MG 1.8 02/16/2021     Lab Results   Component Value Date    TSH 7.54 (H) 01/25/2018      No results found for: BNP    During this visit the following were done:  Labs Reviewed [x]    Labs Ordered []    Radiology Reports Reviewed [x]    Radiology Ordered []    PCP Records Reviewed []    Referring Provider Records Reviewed []    ER Records Reviewed []    Hospital Records Reviewed []    History Obtained From Family []    Radiology Images Reviewed []    Other Reviewed []    Records Requested []          ECG 12 Lead    Date/Time: 3/10/2021 1:43 PM  Performed by: Sajan Plascencia PA-C  Authorized by: Sajan Plascencia PA-C   Comparison: compared with previous ECG   Similar to previous ECG  Rhythm: sinus rhythm  Conduction: conduction normal  ST Segments: ST segments normal    Clinical impression: normal ECG            Assessment/Plan    Diagnosis Plan   1. Preoperative clearance               Recommendations:  1. Preoperative cardiac risk evaluation   1. Patient does not have any known coronary artery disease or structural heart disease and is completely asymptomatic.  EKG today was within normal limits will discuss with Dr. Reyes with clearing patient for surgery with low cardiovascular risk.      Return in about 1 year (around 3/10/2022).    As always, I appreciate very much the opportunity to participate in the cardiovascular care of your patients.      With Best Regards,    Sajan Plascencia PA-C

## 2021-03-12 ENCOUNTER — LAB (OUTPATIENT)
Dept: FAMILY MEDICINE CLINIC | Facility: CLINIC | Age: 41
End: 2021-03-12

## 2021-03-12 DIAGNOSIS — Z01.818 PRE-OPERATIVE CLEARANCE: ICD-10-CM

## 2021-03-12 DIAGNOSIS — Z01.818 PRE-OPERATIVE CLEARANCE: Primary | ICD-10-CM

## 2021-03-12 PROCEDURE — U0004 COV-19 TEST NON-CDC HGH THRU: HCPCS | Performed by: OBSTETRICS & GYNECOLOGY

## 2021-03-13 LAB — SARS-COV-2 RNA NOSE QL NAA+PROBE: NOT DETECTED

## 2021-03-18 ENCOUNTER — BULK ORDERING (OUTPATIENT)
Dept: CASE MANAGEMENT | Facility: OTHER | Age: 41
End: 2021-03-18

## 2021-03-18 DIAGNOSIS — Z23 IMMUNIZATION DUE: ICD-10-CM

## 2021-06-08 ENCOUNTER — TELEPHONE (OUTPATIENT)
Dept: UROLOGY | Facility: CLINIC | Age: 41
End: 2021-06-08

## 2021-06-08 NOTE — TELEPHONE ENCOUNTER
PATIENT SAYS HE STARTING TO HAVE SWELLING AGAIN HE IS ASKEING IF HE CAN HAVE ANTIBIOTIC SENT IN SINCE IT HELPED LAST TIME

## 2021-06-09 DIAGNOSIS — R36.1 HEMATOSPERMIA: Primary | ICD-10-CM

## 2021-06-09 RX ORDER — CIPROFLOXACIN 500 MG/1
500 TABLET, FILM COATED ORAL 2 TIMES DAILY
Qty: 20 TABLET | Refills: 0 | Status: SHIPPED | OUTPATIENT
Start: 2021-06-09 | End: 2021-08-12

## 2021-06-10 ENCOUNTER — TELEPHONE (OUTPATIENT)
Dept: UROLOGY | Facility: CLINIC | Age: 41
End: 2021-06-10

## 2021-06-10 RX ORDER — SULFAMETHOXAZOLE AND TRIMETHOPRIM 800; 160 MG/1; MG/1
1 TABLET ORAL 2 TIMES DAILY
Qty: 84 TABLET | Refills: 3 | Status: CANCELLED | OUTPATIENT
Start: 2021-06-10

## 2021-06-10 NOTE — TELEPHONE ENCOUNTER
Pts wife called and stated that the last time he has this infection in his testicle, we had to give him 2 antibiotics in order to get the swelling to go down. I explained that I will put this thru to Dr Mcguire to send into his local pharmacy.

## 2021-06-10 NOTE — TELEPHONE ENCOUNTER
Left message for the patient to call us to let him know that theres only suppose to be one antibiotic sent in.

## 2021-06-11 ENCOUNTER — OFFICE VISIT (OUTPATIENT)
Dept: FAMILY MEDICINE CLINIC | Facility: CLINIC | Age: 41
End: 2021-06-11

## 2021-06-11 VITALS
DIASTOLIC BLOOD PRESSURE: 80 MMHG | WEIGHT: 203 LBS | HEART RATE: 95 BPM | HEIGHT: 69 IN | BODY MASS INDEX: 30.07 KG/M2 | OXYGEN SATURATION: 97 % | SYSTOLIC BLOOD PRESSURE: 110 MMHG | TEMPERATURE: 96.8 F

## 2021-06-11 DIAGNOSIS — E78.2 MIXED HYPERLIPIDEMIA: Chronic | ICD-10-CM

## 2021-06-11 DIAGNOSIS — E06.3 HYPOTHYROIDISM DUE TO HASHIMOTO'S THYROIDITIS: Chronic | ICD-10-CM

## 2021-06-11 DIAGNOSIS — R73.03 PREDIABETES: Chronic | ICD-10-CM

## 2021-06-11 DIAGNOSIS — N45.1 EPIDIDYMITIS: ICD-10-CM

## 2021-06-11 DIAGNOSIS — E03.8 HYPOTHYROIDISM DUE TO HASHIMOTO'S THYROIDITIS: Chronic | ICD-10-CM

## 2021-06-11 DIAGNOSIS — R36.1 HEMATOSPERMIA: Primary | ICD-10-CM

## 2021-06-11 DIAGNOSIS — R36.1 HEMATOSPERMIA: ICD-10-CM

## 2021-06-11 PROCEDURE — 99214 OFFICE O/P EST MOD 30 MIN: CPT | Performed by: PHYSICIAN ASSISTANT

## 2021-06-11 RX ORDER — SULFAMETHOXAZOLE AND TRIMETHOPRIM 800; 160 MG/1; MG/1
1 TABLET ORAL 2 TIMES DAILY
COMMUNITY
End: 2021-06-11 | Stop reason: SDUPTHER

## 2021-06-11 RX ORDER — SULFAMETHOXAZOLE AND TRIMETHOPRIM 800; 160 MG/1; MG/1
1 TABLET ORAL 2 TIMES DAILY
Qty: 84 TABLET | Refills: 3 | Status: SHIPPED | OUTPATIENT
Start: 2021-06-11 | End: 2021-08-12

## 2021-06-11 RX ORDER — SULFAMETHOXAZOLE AND TRIMETHOPRIM 800; 160 MG/1; MG/1
1 TABLET ORAL 2 TIMES DAILY
Qty: 20 TABLET | Refills: 0 | Status: SHIPPED | OUTPATIENT
Start: 2021-06-11 | End: 2021-06-11 | Stop reason: SDUPTHER

## 2021-06-11 NOTE — PATIENT INSTRUCTIONS
"Fat and Cholesterol Restricted Eating Plan  Getting too much fat and cholesterol in your diet may cause health problems. Choosing the right foods helps keep your fat and cholesterol at normal levels. This can keep you from getting certain diseases.  Your doctor may recommend an eating plan that includes:  · Total fat: ______% or less of total calories a day.  · Saturated fat: ______% or less of total calories a day.  · Cholesterol: less than _________mg a day.  · Fiber: ______g a day.  What are tips for following this plan?  Meal planning  · At meals, divide your plate into four equal parts:  ? Fill one-half of your plate with vegetables and green salads.  ? Fill one-fourth of your plate with whole grains.  ? Fill one-fourth of your plate with low-fat (lean) protein foods.  · Eat fish that is high in omega-3 fats at least two times a week. This includes mackerel, tuna, sardines, and salmon.  · Eat foods that are high in fiber, such as whole grains, beans, apples, broccoli, carrots, peas, and barley.  General tips    · Work with your doctor to lose weight if you need to.  · Avoid:  ? Foods with added sugar.  ? Fried foods.  ? Foods with partially hydrogenated oils.  · Limit alcohol intake to no more than 1 drink a day for nonpregnant women and 2 drinks a day for men. One drink equals 12 oz of beer, 5 oz of wine, or 1½ oz of hard liquor.  Reading food labels  · Check food labels for:  ? Trans fats.  ? Partially hydrogenated oils.  ? Saturated fat (g) in each serving.  ? Cholesterol (mg) in each serving.  ? Fiber (g) in each serving.  · Choose foods with healthy fats, such as:  ? Monounsaturated fats.  ? Polyunsaturated fats.  ? Omega-3 fats.  · Choose grain products that have whole grains. Look for the word \"whole\" as the first word in the ingredient list.  Cooking  · Cook foods using low-fat methods. These include baking, boiling, grilling, and broiling.  · Eat more home-cooked foods. Eat at restaurants and buffets " less often.  · Avoid cooking using saturated fats, such as butter, cream, palm oil, palm kernel oil, and coconut oil.  Recommended foods    Fruits  · All fresh, canned (in natural juice), or frozen fruits.  Vegetables  · Fresh or frozen vegetables (raw, steamed, roasted, or grilled). Green salads.  Grains  · Whole grains, such as whole wheat or whole grain breads, crackers, cereals, and pasta. Unsweetened oatmeal, bulgur, barley, quinoa, or brown rice. Corn or whole wheat flour tortillas.  Meats and other protein foods  · Ground beef (85% or leaner), grass-fed beef, or beef trimmed of fat. Skinless chicken or turkey. Ground chicken or turkey. Pork trimmed of fat. All fish and seafood. Egg whites. Dried beans, peas, or lentils. Unsalted nuts or seeds. Unsalted canned beans. Nut butters without added sugar or oil.  Dairy  · Low-fat or nonfat dairy products, such as skim or 1% milk, 2% or reduced-fat cheeses, low-fat and fat-free ricotta or cottage cheese, or plain low-fat and nonfat yogurt.  Fats and oils  · Tub margarine without trans fats. Light or reduced-fat mayonnaise and salad dressings. Avocado. Olive, canola, sesame, or safflower oils.  The items listed above may not be a complete list of foods and beverages you can eat. Contact a dietitian for more information.  Foods to avoid  Fruits  · Canned fruit in heavy syrup. Fruit in cream or butter sauce. Fried fruit.  Vegetables  · Vegetables cooked in cheese, cream, or butter sauce. Fried vegetables.  Grains  · White bread. White pasta. White rice. Cornbread. Bagels, pastries, and croissants. Crackers and snack foods that contain trans fat and hydrogenated oils.  Meats and other protein foods  · Fatty cuts of meat. Ribs, chicken wings, jean, sausage, bologna, salami, chitterlings, fatback, hot dogs, bratwurst, and packaged lunch meats. Liver and organ meats. Whole eggs and egg yolks. Chicken and turkey with skin. Fried meat.  Dairy  · Whole or 2% milk, cream,  half-and-half, and cream cheese. Whole milk cheeses. Whole-fat or sweetened yogurt. Full-fat cheeses. Nondairy creamers and whipped toppings. Processed cheese, cheese spreads, and cheese curds.  Beverages  · Alcohol. Sugar-sweetened drinks such as sodas, lemonade, and fruit drinks.  Fats and oils  · Butter, stick margarine, lard, shortening, ghee, or jean fat. Coconut, palm kernel, and palm oils.  Sweets and desserts  · Corn syrup, sugars, honey, and molasses. Candy. Jam and jelly. Syrup. Sweetened cereals. Cookies, pies, cakes, donuts, muffins, and ice cream.  The items listed above may not be a complete list of foods and beverages you should avoid. Contact a dietitian for more information.  Summary  · Choosing the right foods helps keep your fat and cholesterol at normal levels. This can keep you from getting certain diseases.  · At meals, fill one-half of your plate with vegetables and green salads.  · Eat high-fiber foods, like whole grains, beans, apples, carrots, peas, and barley.  · Limit added sugar, saturated fats, alcohol, and fried foods.  This information is not intended to replace advice given to you by your health care provider. Make sure you discuss any questions you have with your health care provider.  Document Revised: 08/21/2019 Document Reviewed: 09/04/2018  [employer] Patient Education © 2021 [employer]      Carbohydrate Counting for Diabetes Mellitus, Adult  Carbohydrate counting is a method of keeping track of how many carbohydrates you eat. Eating carbohydrates naturally increases the amount of sugar (glucose) in the blood. Counting how many carbohydrates you eat improves your blood glucose control, which helps you manage your diabetes.  It is important to know how many carbohydrates you can safely have in each meal. This is different for every person. A dietitian can help you make a meal plan and calculate how many carbohydrates you should have at each meal and snack.  What foods contain  carbohydrates?  Carbohydrates are found in the following foods:  · Grains, such as breads and cereals.  · Dried beans and soy products.  · Starchy vegetables, such as potatoes, peas, and corn.  · Fruit and fruit juices.  · Milk and yogurt.  · Sweets and snack foods, such as cake, cookies, candy, chips, and soft drinks.  How do I count carbohydrates in foods?  There are two ways to count carbohydrates in food. You can read food labels or learn standard serving sizes of foods. You can use either of the methods or a combination of both.  Using the Nutrition Facts label  The Nutrition Facts list is included on the labels of almost all packaged foods and beverages in the [place] It includes:  · The serving size.  · Information about nutrients in each serving, including the grams (g) of carbohydrate per serving.  To use the Nutrition Facts:  · Decide how many servings you will have.  · Multiply the number of servings by the number of carbohydrates per serving.  · The resulting number is the total amount of carbohydrates that you will be having.  Learning the standard serving sizes of foods  When you eat carbohydrate foods that are not packaged or do not include Nutrition Facts on the label, you need to measure the servings in order to count the amount of carbohydrates.  · Measure the foods that you will eat with a food scale or measuring cup, if needed.  · Decide how many standard-size servings you will eat.  · Multiply the number of servings by 15. For foods that contain carbohydrates, one serving equals 15 g of carbohydrates.  ? For example, if you eat 2 cups or 10 oz (300 g) of strawberries, you will have eaten 2 servings and 30 g of carbohydrates (2 servings x 15 g = 30 g).  · For foods that have more than one food mixed, such as soups and casseroles, you must count the carbohydrates in each food that is included.  The following list contains standard serving sizes of common carbohydrate-rich foods. Each of these  servings has about 15 g of carbohydrates:  · 1 slice of bread.  · 1 six-inch (15 cm) tortilla.  · ? cup or 2 oz (53 g) cooked rice or pasta.  · ½ cup or 3 oz (85 g) cooked or canned, drained and rinsed beans or lentils.  · ½ cup or 3 oz (85 g) starchy vegetable, such as peas, corn, or squash.  · ½ cup or 4 oz (120 g) hot cereal.  · ½ cup or 3 oz (85 g) boiled or mashed potatoes, or ¼ or 3 oz (85 g) of a large baked potato.  · ½ cup or 4 fl oz (118 mL) fruit juice.  · 1 cup or 8 fl oz (237 mL) milk.  · 1 small or 4 oz (106 g) apple.  · ½ or 2 oz (63 g) of a medium banana.  · 1 cup or 5 oz (150 g) strawberries.  · 3 cups or 1 oz (24 g) popped popcorn.  What is an example of carbohydrate counting?  To calculate the number of carbohydrates in this sample meal, follow the steps shown below.  Sample meal  · 3 oz (85 g) chicken breast.  · ? cup or 4 oz (106 g) brown rice.  · ½ cup or 3 oz (85 g) corn.  · 1 cup or 8 fl oz (237 mL) milk.  · 1 cup or 5 oz (150 g) strawberries with sugar-free whipped topping.  Carbohydrate calculation  1. Identify the foods that contain carbohydrates:  ? Rice.  ? Corn.  ? Milk.  ? Strawberries.  2. Calculate how many servings you have of each food:  ? 2 servings rice.  ? 1 serving corn.  ? 1 serving milk.  ? 1 serving strawberries.  3. Multiply each number of servings by 15 g:  ? 2 servings rice x 15 g = 30 g.  ? 1 serving corn x 15 g = 15 g.  ? 1 serving milk x 15 g = 15 g.  ? 1 serving strawberries x 15 g = 15 g.  4. Add together all of the amounts to find the total grams of carbohydrates eaten:  ? 30 g + 15 g + 15 g + 15 g = 75 g of carbohydrates total.  What are tips for following this plan?  Shopping  · Develop a meal plan and then make a shopping list.  · Buy fresh and frozen vegetables, fresh and frozen fruit, dairy, eggs, beans, lentils, and whole grains.  · Look at food labels. Choose foods that have more fiber and less sugar.  · Avoid processed foods and foods with added  sugars.  Meal planning  · Aim to have the same amount of carbohydrates at each meal and for each snack time.  · Plan to have regular, balanced meals and snacks.  Where to find more information  · American Diabetes Association: www.diabetes.org  · Centers for Disease Control and Prevention: www.cdc.gov  Summary  · Carbohydrate counting is a method of keeping track of how many carbohydrates you eat.  · Eating carbohydrates naturally increases the amount of sugar (glucose) in the blood.  · Counting how many carbohydrates you eat improves your blood glucose control, which helps you manage your diabetes.  · A dietitian can help you make a meal plan and calculate how many carbohydrates you should have at each meal and snack.  This information is not intended to replace advice given to you by your health care provider. Make sure you discuss any questions you have with your health care provider.  Document Revised: 12/18/2020 Document Reviewed: 12/18/2020  [employer] Patient Education © 2021 [employer]

## 2021-06-11 NOTE — TELEPHONE ENCOUNTER
I called in a verbal but we will send this in thru the computer, per pts chart his primary care just sent thru # 20 but pt will need more in the long run. Routed to [employer] to send via e-scribe

## 2021-06-11 NOTE — PROGRESS NOTES
Siomara Christian is a 41 y.o. male.       Chief Complaint -testicular problem    History of Present Illness -    ROS    He is here today with his wife who is helping with the history.    Testicular issue-  Patient complains of swelling of the left testicle.  He had similar symptoms in the past and was given diagnosis of hematospermia and epididymitis by urologist.  Patient has contacted Dr. Mcguire's office and was sent in prescription for Cipro on 6/9/2021.  Patient states the combination of Cipro and Bactrim worked well in the past but Cipro did not work alone.  Patient is unable to obtain and an office visit with Dr. Martinez's office due to lack of schedule availability.  Patient declines ER visit today.  Some relief with tight clothing as he states he was advised to compress the area in the past to prevent further swelling.  Patient states his father has history of having testicle removed for unknown reasons.    Hyperlipidemia-currently stable with diet    Hypothyroidism-stable with Synthroid 125 mcg daily    Prediabetes-stable with low-carb diet    The following portions of the patient's history were reviewed and updated as appropriate: allergies, current medications, past family history, past medical history, past social history, past surgical history and problem list.    Review of Systems   Constitutional: Negative for activity change, appetite change, fatigue and fever.   HENT: Negative for ear pain, sinus pressure and sore throat.    Eyes: Negative for pain and visual disturbance.   Respiratory: Negative for cough and chest tightness.    Cardiovascular: Negative for chest pain and palpitations.   Gastrointestinal: Negative for abdominal pain, constipation, diarrhea, nausea and vomiting.   Endocrine: Negative for polydipsia and polyuria.   Genitourinary: Positive for scrotal swelling and testicular pain (Mild discomfort associated with the swelling). Negative for dysuria and frequency.  "  Musculoskeletal: Negative for back pain and myalgias.   Skin: Negative for color change and rash.   Allergic/Immunologic: Negative for food allergies and immunocompromised state.   Neurological: Negative for dizziness, syncope and headaches.   Hematological: Negative for adenopathy. Does not bruise/bleed easily.   Psychiatric/Behavioral: Negative for hallucinations and suicidal ideas. The patient is not nervous/anxious.        Objective  Vital signs:  /80   Pulse 95   Temp 96.8 °F (36 °C) (Tympanic)   Ht 175.3 cm (69\")   Wt 92.1 kg (203 lb)   SpO2 97%   BMI 29.98 kg/m²     Physical Exam  Vitals and nursing note reviewed.   Constitutional:       Appearance: Normal appearance. He is well-developed.   HENT:      Head: Normocephalic and atraumatic.      Right Ear: Tympanic membrane normal.      Left Ear: Tympanic membrane normal.      Nose: Nose normal.      Mouth/Throat:      Mouth: Mucous membranes are moist.      Pharynx: No oropharyngeal exudate or posterior oropharyngeal erythema.   Eyes:      Extraocular Movements: Extraocular movements intact.      Conjunctiva/sclera: Conjunctivae normal.   Neck:      Thyroid: No thyromegaly.      Trachea: No tracheal deviation.   Cardiovascular:      Rate and Rhythm: Normal rate and regular rhythm.      Heart sounds: Normal heart sounds. No murmur heard.     Pulmonary:      Effort: Pulmonary effort is normal. No respiratory distress.      Breath sounds: Normal breath sounds. No wheezing.   Abdominal:      General: Bowel sounds are normal.      Palpations: Abdomen is soft.      Tenderness: There is no abdominal tenderness. There is no guarding.   Genitourinary:     Comments: Patient declines genitourinary exam today in office  Musculoskeletal:         General: No tenderness. Normal range of motion.      Cervical back: Normal range of motion and neck supple.   Lymphadenopathy:      Cervical: No cervical adenopathy.   Skin:     General: Skin is warm and dry.      " Findings: No rash.   Neurological:      General: No focal deficit present.      Mental Status: He is alert and oriented to person, place, and time.   Psychiatric:         Mood and Affect: Mood normal.         Behavior: Behavior normal.         Thought Content: Thought content normal.         The following data was reviewed by: JÚNIOR Nicholas on 06/11/2021:  CMP    CMP 2/16/21   Glucose 110 (A)   BUN 18   Creatinine 1.45 (A)   eGFR Non African Am 54 (A)   Sodium 137   Potassium 3.8   Chloride 100   Calcium 9.4   Albumin 4.36   Total Bilirubin 0.5   Alkaline Phosphatase 79   AST (SGOT) 48 (A)   ALT (SGPT) 68 (A)   (A) Abnormal value            CBC w/diff    CBC w/Diff 9/8/20 2/16/21   WBC 10.63 8.24   RBC 5.36 5.07   Hemoglobin 15.5 13.9   Hematocrit 45.5 43.0   MCV 84.9 84.8   MCH 28.9 27.4   MCHC 34.1 32.3   RDW 12.9 13.7   Platelets 344 255   Neutrophil Rel % 50.9 79.8 (A)   Immature Granulocyte Rel % 1.3 (A) 0.2   Lymphocyte Rel % 36.3 10.6 (A)   Monocyte Rel % 7.7 8.3   Eosinophil Rel % 2.9 0.6   Basophil Rel % 0.9 0.5   (A) Abnormal value                               Assessment/Plan     Diagnoses and all orders for this visit:    1. Hematospermia (Primary)  Comments:  Continue Cipro and start Bactrim  RTC if symptoms persist or worsen over the next 5 days  Orders:  -     sulfamethoxazole-trimethoprim (BACTRIM DS,SEPTRA DS) 800-160 MG per tablet; Take 1 tablet by mouth 2 (Two) Times a Day.  Dispense: 20 tablet; Refill: 0  -     Ambulatory Referral to Urology    2. Epididymitis  Comments:  Start Bactrim and advised warm compresses  Advised to go to nearest ER should symptoms persist or worsen  Patient declines ER today    3. Mixed hyperlipidemia  Comments:  Advised low-cholesterol diet    4. Hypothyroidism due to Hashimoto's thyroiditis  Comments:  Continue Synthroid 125 mcg daily    5. Prediabetes  Comments:  Advised a low carbohydrate diet  Continue to monitor    Testicular swelling-  We discussed the  differential diagnosis and due to hematospermia and history of epididymitis patient will be treated accordingly with Bactrim and continuation of Cipro.  He request a second opinion by different urologist in [place] as patient thinks he may get an sooner with the [place] provider.  He was advised that if symptoms persist or worsen he should go to the ER for immediate evaluation.  Patient declines testicular ultrasound today.        Patient was given instructions and counseling regarding his condition or for health maintenance advice. Please see specific information pulled into the AVS if appropriate      This document has been electronically signed by:  Tracee Santos PA-C

## 2021-06-16 ENCOUNTER — TELEPHONE (OUTPATIENT)
Dept: FAMILY MEDICINE CLINIC | Facility: CLINIC | Age: 41
End: 2021-06-16

## 2021-08-12 ENCOUNTER — OFFICE VISIT (OUTPATIENT)
Dept: FAMILY MEDICINE CLINIC | Facility: CLINIC | Age: 41
End: 2021-08-12

## 2021-08-12 VITALS
DIASTOLIC BLOOD PRESSURE: 74 MMHG | HEIGHT: 69 IN | BODY MASS INDEX: 30.51 KG/M2 | WEIGHT: 206 LBS | TEMPERATURE: 96.8 F | OXYGEN SATURATION: 98 % | HEART RATE: 80 BPM | SYSTOLIC BLOOD PRESSURE: 120 MMHG

## 2021-08-12 DIAGNOSIS — R05.9 COUGH: ICD-10-CM

## 2021-08-12 DIAGNOSIS — E03.8 HYPOTHYROIDISM DUE TO HASHIMOTO'S THYROIDITIS: Chronic | ICD-10-CM

## 2021-08-12 DIAGNOSIS — E06.3 HYPOTHYROIDISM DUE TO HASHIMOTO'S THYROIDITIS: Chronic | ICD-10-CM

## 2021-08-12 DIAGNOSIS — K21.00 GASTROESOPHAGEAL REFLUX DISEASE WITH ESOPHAGITIS WITHOUT HEMORRHAGE: Chronic | ICD-10-CM

## 2021-08-12 DIAGNOSIS — Z13.220 SCREENING FOR HYPERLIPIDEMIA: ICD-10-CM

## 2021-08-12 DIAGNOSIS — R07.1 CHEST PAIN ON BREATHING: Primary | Chronic | ICD-10-CM

## 2021-08-12 PROBLEM — Z86.79 HISTORY OF CHF (CONGESTIVE HEART FAILURE): Status: ACTIVE | Noted: 2021-08-12

## 2021-08-12 PROCEDURE — 93000 ELECTROCARDIOGRAM COMPLETE: CPT | Performed by: PHYSICIAN ASSISTANT

## 2021-08-12 PROCEDURE — 84439 ASSAY OF FREE THYROXINE: CPT | Performed by: PHYSICIAN ASSISTANT

## 2021-08-12 PROCEDURE — 85025 COMPLETE CBC W/AUTO DIFF WBC: CPT | Performed by: PHYSICIAN ASSISTANT

## 2021-08-12 PROCEDURE — 84481 FREE ASSAY (FT-3): CPT | Performed by: PHYSICIAN ASSISTANT

## 2021-08-12 PROCEDURE — 80053 COMPREHEN METABOLIC PANEL: CPT | Performed by: PHYSICIAN ASSISTANT

## 2021-08-12 PROCEDURE — 96372 THER/PROPH/DIAG INJ SC/IM: CPT | Performed by: PHYSICIAN ASSISTANT

## 2021-08-12 PROCEDURE — 84443 ASSAY THYROID STIM HORMONE: CPT | Performed by: PHYSICIAN ASSISTANT

## 2021-08-12 PROCEDURE — 99214 OFFICE O/P EST MOD 30 MIN: CPT | Performed by: PHYSICIAN ASSISTANT

## 2021-08-12 PROCEDURE — 80061 LIPID PANEL: CPT | Performed by: PHYSICIAN ASSISTANT

## 2021-08-12 RX ORDER — PANTOPRAZOLE SODIUM 40 MG/1
40 TABLET, DELAYED RELEASE ORAL DAILY
COMMUNITY
End: 2021-11-22

## 2021-08-12 RX ORDER — SUCRALFATE 1 G/1
1 TABLET ORAL 4 TIMES DAILY
Qty: 120 TABLET | Refills: 0 | Status: SHIPPED | OUTPATIENT
Start: 2021-08-12 | End: 2021-09-10

## 2021-08-12 RX ORDER — METHYLPREDNISOLONE ACETATE 80 MG/ML
80 INJECTION, SUSPENSION INTRA-ARTICULAR; INTRALESIONAL; INTRAMUSCULAR; SOFT TISSUE ONCE
Status: COMPLETED | OUTPATIENT
Start: 2021-08-12 | End: 2021-08-12

## 2021-08-12 RX ADMIN — METHYLPREDNISOLONE ACETATE 80 MG: 80 INJECTION, SUSPENSION INTRA-ARTICULAR; INTRALESIONAL; INTRAMUSCULAR; SOFT TISSUE at 11:39

## 2021-08-12 NOTE — PROGRESS NOTES
Siomara Christian is a 41 y.o. male.       Chief Complaint -chest pain    History of Present Illness -    ROS    Chest pain-  Patient complains of substernal burning sharp moderate chest pain that is worse at rest for the past 2 weeks.  No association with exertion or eating.  He reports that pain is sharp when he takes a deep breath.  He denies any associated palpitations or diaphoresis.  He has previously been referred to pulmonology but had to reschedule that appointment for October 2021.    Gastroesophageal reflux disease-  Not at goal with Protonix.  We discussed that he is substernal chest issue could be esophagitis or H. pylori.    Hypothyroidism-stable with Synthroid 125 mcg daily    The following portions of the patient's history were reviewed and updated as appropriate: allergies, current medications, past family history, past medical history, past social history, past surgical history and problem list.    Review of Systems   Constitutional: Negative for activity change, appetite change, fatigue and fever.   HENT: Negative for ear pain, sinus pressure and sore throat.    Eyes: Negative for pain and visual disturbance.   Respiratory: Negative for cough, chest tightness and shortness of breath (Sharp chest pain with taking a deep breath but no prolonged shortness of breath reported).    Cardiovascular: Positive for chest pain. Negative for palpitations.   Gastrointestinal: Negative for abdominal pain, constipation, diarrhea, nausea and vomiting.        Heartburn   Endocrine: Negative for polydipsia and polyuria.   Genitourinary: Negative for dysuria and frequency.   Musculoskeletal: Negative for back pain and myalgias.   Skin: Negative for color change and rash.   Allergic/Immunologic: Negative for food allergies and immunocompromised state.   Neurological: Negative for dizziness, syncope and headaches.   Hematological: Negative for adenopathy. Does not bruise/bleed easily.   Psychiatric/Behavioral:  "Negative for hallucinations and suicidal ideas. The patient is not nervous/anxious.        Objective  Vital signs:  /74   Pulse 80   Temp 96.8 °F (36 °C) (Temporal)   Ht 175.3 cm (69\")   Wt 93.4 kg (206 lb)   SpO2 98%   BMI 30.42 kg/m²     Physical Exam  Vitals and nursing note reviewed.   Constitutional:       Appearance: Normal appearance. He is well-developed.   HENT:      Head: Normocephalic and atraumatic.      Right Ear: Tympanic membrane normal.      Left Ear: Tympanic membrane normal.      Nose: Nose normal.      Mouth/Throat:      Mouth: Mucous membranes are moist.      Pharynx: No oropharyngeal exudate or posterior oropharyngeal erythema.   Eyes:      Extraocular Movements: Extraocular movements intact.      Conjunctiva/sclera: Conjunctivae normal.   Neck:      Thyroid: No thyromegaly.      Trachea: No tracheal deviation.   Cardiovascular:      Rate and Rhythm: Normal rate and regular rhythm.      Heart sounds: Normal heart sounds. No murmur heard.     Pulmonary:      Effort: Pulmonary effort is normal. No respiratory distress.      Breath sounds: Normal breath sounds. No wheezing.   Abdominal:      General: Bowel sounds are normal.      Palpations: Abdomen is soft.      Tenderness: There is no abdominal tenderness. There is no guarding.   Musculoskeletal:         General: No tenderness. Normal range of motion.      Cervical back: Normal range of motion and neck supple.   Lymphadenopathy:      Cervical: No cervical adenopathy.   Skin:     General: Skin is warm and dry.      Findings: No rash.   Neurological:      General: No focal deficit present.      Mental Status: He is alert and oriented to person, place, and time.   Psychiatric:         Mood and Affect: Mood normal.         Behavior: Behavior normal.         Thought Content: Thought content normal.         The following data was reviewed by: JÚNIOR Nicholas on 08/12/2021:  CMP    CMP 2/16/21   Glucose 110 (A)   BUN 18   Creatinine " 1.45 (A)   eGFR Non African Am 54 (A)   Sodium 137   Potassium 3.8   Chloride 100   Calcium 9.4   Albumin 4.36   Total Bilirubin 0.5   Alkaline Phosphatase 79   AST (SGOT) 48 (A)   ALT (SGPT) 68 (A)   (A) Abnormal value            CBC w/diff    CBC w/Diff 9/8/20 2/16/21   WBC 10.63 8.24   RBC 5.36 5.07   Hemoglobin 15.5 13.9   Hematocrit 45.5 43.0   MCV 84.9 84.8   MCH 28.9 27.4   MCHC 34.1 32.3   RDW 12.9 13.7   Platelets 344 255   Neutrophil Rel % 50.9 79.8 (A)   Immature Granulocyte Rel % 1.3 (A) 0.2   Lymphocyte Rel % 36.3 10.6 (A)   Monocyte Rel % 7.7 8.3   Eosinophil Rel % 2.9 0.6   Basophil Rel % 0.9 0.5   (A) Abnormal value                    Data reviewed: EKG today and previous EKG      Assessment/Plan     Diagnoses and all orders for this visit:    1. Chest pain on breathing (Primary)  Comments:  Advised patient if chest pain persists or worsens he should go to the ER for immediate assistance.  Orders:  -     ECG 12 Lead  -     XR Chest PA & Lateral; Future  -     methylPREDNISolone acetate (DEPO-medrol) injection 80 mg  -     Helicobacter Pylori, IgA IgG IgM  -     Comprehensive Metabolic Panel  -     CBC & Differential  -     TSH    2. Gastroesophageal reflux disease with esophagitis without hemorrhage  Comments:  Discussed differential diagnosis including possible PUD, esophagitis, or H. pylori  Start Carafate  Discussed possible referral to GI if needed  Orders:  -     sucralfate (Carafate) 1 g tablet; Take 1 tablet by mouth 4 (Four) Times a Day.  Dispense: 120 tablet; Refill: 0    3. Screening for hyperlipidemia  -     Lipid Panel    4. Hypothyroidism due to Hashimoto's thyroiditis  Comments:  Continue Synthroid 125 mcg daily  Orders:  -     T4, Free  -     T3, Free      8/12/2021 at 11:31 AM  EKG  Performed by: Gemma Mixon CMA  Interpreted by: Tracee Santos PA-C  Indication: Chest pain  Comparison: Compared with EKG 3/10/2021 with no significant changes appreciated.  Rhythm: Normal sinus  rhythm  Rate: Normal at 85 bpm  MS interval: 151  Conduction: Normal conduction  ST segments: ST segments normal  Interpretation: Sinus rhythm: Normal EKG          Patient was given instructions and counseling regarding his condition or for health maintenance advice. Please see specific information pulled into the AVS if appropriate      This document has been electronically signed by:  Tracee Santos PA-C

## 2021-08-12 NOTE — PATIENT INSTRUCTIONS

## 2021-08-13 LAB
ALBUMIN SERPL-MCNC: 4.9 G/DL (ref 3.5–5.2)
ALBUMIN/GLOB SERPL: 1.5 G/DL
ALP SERPL-CCNC: 67 U/L (ref 39–117)
ALT SERPL W P-5'-P-CCNC: 31 U/L (ref 1–41)
ANION GAP SERPL CALCULATED.3IONS-SCNC: 13.8 MMOL/L (ref 5–15)
AST SERPL-CCNC: 27 U/L (ref 1–40)
BASOPHILS # BLD AUTO: 0.07 10*3/MM3 (ref 0–0.2)
BASOPHILS NFR BLD AUTO: 0.9 % (ref 0–1.5)
BILIRUB SERPL-MCNC: 0.6 MG/DL (ref 0–1.2)
BUN SERPL-MCNC: 14 MG/DL (ref 6–20)
BUN/CREAT SERPL: 13.7 (ref 7–25)
CALCIUM SPEC-SCNC: 9.6 MG/DL (ref 8.6–10.5)
CHLORIDE SERPL-SCNC: 100 MMOL/L (ref 98–107)
CHOLEST SERPL-MCNC: 180 MG/DL (ref 0–200)
CO2 SERPL-SCNC: 21.2 MMOL/L (ref 22–29)
CREAT SERPL-MCNC: 1.02 MG/DL (ref 0.76–1.27)
DEPRECATED RDW RBC AUTO: 47.1 FL (ref 37–54)
EOSINOPHIL # BLD AUTO: 0.22 10*3/MM3 (ref 0–0.4)
EOSINOPHIL NFR BLD AUTO: 2.9 % (ref 0.3–6.2)
ERYTHROCYTE [DISTWIDTH] IN BLOOD BY AUTOMATED COUNT: 15.1 % (ref 12.3–15.4)
GFR SERPL CREATININE-BSD FRML MDRD: 80 ML/MIN/1.73
GLOBULIN UR ELPH-MCNC: 3.2 GM/DL
GLUCOSE SERPL-MCNC: 86 MG/DL (ref 65–99)
HCT VFR BLD AUTO: 50.4 % (ref 37.5–51)
HDLC SERPL-MCNC: 34 MG/DL (ref 40–60)
HGB BLD-MCNC: 15.9 G/DL (ref 13–17.7)
IMM GRANULOCYTES # BLD AUTO: 0.08 10*3/MM3 (ref 0–0.05)
IMM GRANULOCYTES NFR BLD AUTO: 1.1 % (ref 0–0.5)
LDLC SERPL CALC-MCNC: 103 MG/DL (ref 0–100)
LDLC/HDLC SERPL: 2.81 {RATIO}
LYMPHOCYTES # BLD AUTO: 2.74 10*3/MM3 (ref 0.7–3.1)
LYMPHOCYTES NFR BLD AUTO: 36.4 % (ref 19.6–45.3)
MCH RBC QN AUTO: 26.9 PG (ref 26.6–33)
MCHC RBC AUTO-ENTMCNC: 31.5 G/DL (ref 31.5–35.7)
MCV RBC AUTO: 85.1 FL (ref 79–97)
MONOCYTES # BLD AUTO: 0.64 10*3/MM3 (ref 0.1–0.9)
MONOCYTES NFR BLD AUTO: 8.5 % (ref 5–12)
NEUTROPHILS NFR BLD AUTO: 3.77 10*3/MM3 (ref 1.7–7)
NEUTROPHILS NFR BLD AUTO: 50.2 % (ref 42.7–76)
NRBC BLD AUTO-RTO: 0 /100 WBC (ref 0–0.2)
PLATELET # BLD AUTO: 348 10*3/MM3 (ref 140–450)
PMV BLD AUTO: 8.5 FL (ref 6–12)
POTASSIUM SERPL-SCNC: 4.7 MMOL/L (ref 3.5–5.2)
PROT SERPL-MCNC: 8.1 G/DL (ref 6–8.5)
RBC # BLD AUTO: 5.92 10*6/MM3 (ref 4.14–5.8)
SODIUM SERPL-SCNC: 135 MMOL/L (ref 136–145)
T3FREE SERPL-MCNC: 4.14 PG/ML (ref 2–4.4)
T4 FREE SERPL-MCNC: 1.28 NG/DL (ref 0.93–1.7)
TRIGL SERPL-MCNC: 253 MG/DL (ref 0–150)
TSH SERPL DL<=0.05 MIU/L-ACNC: 2.89 UIU/ML (ref 0.27–4.2)
VLDLC SERPL-MCNC: 43 MG/DL (ref 5–40)
WBC # BLD AUTO: 7.52 10*3/MM3 (ref 3.4–10.8)

## 2021-09-09 DIAGNOSIS — E03.8 HYPOTHYROIDISM DUE TO HASHIMOTO'S THYROIDITIS: ICD-10-CM

## 2021-09-09 DIAGNOSIS — K21.00 GASTROESOPHAGEAL REFLUX DISEASE WITH ESOPHAGITIS WITHOUT HEMORRHAGE: Chronic | ICD-10-CM

## 2021-09-09 DIAGNOSIS — E06.3 HYPOTHYROIDISM DUE TO HASHIMOTO'S THYROIDITIS: ICD-10-CM

## 2021-09-10 RX ORDER — LEVOTHYROXINE SODIUM 0.12 MG/1
125 TABLET ORAL DAILY
Qty: 30 TABLET | Refills: 5 | Status: SHIPPED | OUTPATIENT
Start: 2021-09-10 | End: 2022-03-30

## 2021-09-10 RX ORDER — SUCRALFATE 1 G/1
TABLET ORAL
Qty: 120 TABLET | Refills: 0 | Status: SHIPPED | OUTPATIENT
Start: 2021-09-10 | End: 2021-11-29

## 2021-11-22 RX ORDER — PANTOPRAZOLE SODIUM 40 MG/1
TABLET, DELAYED RELEASE ORAL
Qty: 30 TABLET | Refills: 5 | Status: SHIPPED | OUTPATIENT
Start: 2021-11-22 | End: 2022-05-26 | Stop reason: SDUPTHER

## 2021-11-28 DIAGNOSIS — K21.00 GASTROESOPHAGEAL REFLUX DISEASE WITH ESOPHAGITIS WITHOUT HEMORRHAGE: Chronic | ICD-10-CM

## 2021-11-29 RX ORDER — SUCRALFATE 1 G/1
TABLET ORAL
Qty: 120 TABLET | Refills: 0 | Status: SHIPPED | OUTPATIENT
Start: 2021-11-29 | End: 2022-02-04

## 2022-02-04 ENCOUNTER — OFFICE VISIT (OUTPATIENT)
Dept: FAMILY MEDICINE CLINIC | Facility: CLINIC | Age: 42
End: 2022-02-04

## 2022-02-04 VITALS — WEIGHT: 200 LBS | BODY MASS INDEX: 29.62 KG/M2 | HEIGHT: 69 IN

## 2022-02-04 DIAGNOSIS — Z86.79 HISTORY OF CHF (CONGESTIVE HEART FAILURE): Chronic | ICD-10-CM

## 2022-02-04 DIAGNOSIS — U07.1 COVID-19: Primary | ICD-10-CM

## 2022-02-04 DIAGNOSIS — K21.00 GASTROESOPHAGEAL REFLUX DISEASE WITH ESOPHAGITIS WITHOUT HEMORRHAGE: Chronic | ICD-10-CM

## 2022-02-04 DIAGNOSIS — E03.8 HYPOTHYROIDISM DUE TO HASHIMOTO'S THYROIDITIS: Chronic | ICD-10-CM

## 2022-02-04 DIAGNOSIS — E06.3 HYPOTHYROIDISM DUE TO HASHIMOTO'S THYROIDITIS: Chronic | ICD-10-CM

## 2022-02-04 PROCEDURE — 99214 OFFICE O/P EST MOD 30 MIN: CPT | Performed by: PHYSICIAN ASSISTANT

## 2022-02-04 RX ORDER — SUCRALFATE ORAL 1 G/10ML
1 SUSPENSION ORAL
Qty: 420 ML | Refills: 0 | Status: SHIPPED | OUTPATIENT
Start: 2022-02-04 | End: 2022-05-19 | Stop reason: SDUPTHER

## 2022-02-04 NOTE — PROGRESS NOTES
"Siomara Christian is a 41 y.o. male.     Telehealth visit    You have chosen to receive care through a telephone visit. Do you consent to use a telephone visit for your medical care today? Yes      Chief Complaint -fever    History of Present Illness -    ROS    Fever-  Patient complains of fever as high as 103.5 °F, runny nose, nausea, vomiting and dry cough.  He complains of mildly were short of breath within the past 2 days.  Onset yesterday.  Some relief with Tylenol or Motrin.    Gastroesophageal reflux disease-  Patient reports improvement of esophagitis with the use of Carafate but is having trouble swallowing the large pills.  Minimal relief of symptoms with pantoprazole alone.    Congestive heart failure-  Stable with conservative measures    Hypothyroidism-stable with Synthroid 125 mcg daily    The following portions of the patient's history were reviewed and updated as appropriate: allergies, current medications, past family history, past medical history, past social history, past surgical history and problem list.    Review of Systems    Objective  Vital signs:  Ht 175.3 cm (69\")   Wt 90.7 kg (200 lb)   BMI 29.53 kg/m²     Physical Exam  Vitals and nursing note reviewed.   Constitutional:       Appearance: Normal appearance. He is ill-appearing.   HENT:      Head: Normocephalic and atraumatic.   Eyes:      Extraocular Movements: Extraocular movements intact.      Conjunctiva/sclera: Conjunctivae normal.   Pulmonary:      Effort: Pulmonary effort is normal.      Breath sounds: Normal breath sounds. No wheezing.      Comments: Breath sounds appear normal without use of accessory muscles or wheezing appreciated today  Musculoskeletal:      Cervical back: Normal range of motion and neck supple.   Skin:     General: Skin is dry.      Findings: No erythema or rash.   Neurological:      General: No focal deficit present.      Mental Status: He is alert and oriented to person, place, and time. "   Psychiatric:         Mood and Affect: Mood normal.         Thought Content: Thought content normal.         The following data was reviewed by: JÚNIOR Nichoals on 02/04/2022:  CMP    CMP 2/16/21 8/12/21   Glucose 110 (A) 86   BUN 18 14   Creatinine 1.45 (A) 1.02   eGFR Non African Am 54 (A) 80   Sodium 137 135 (A)   Potassium 3.8 4.7   Chloride 100 100   Calcium 9.4 9.6   Albumin 4.36 4.90   Total Bilirubin 0.5 0.6   Alkaline Phosphatase 79 67   AST (SGOT) 48 (A) 27   ALT (SGPT) 68 (A) 31   (A) Abnormal value            CBC w/diff    CBC w/Diff 2/16/21 8/12/21   WBC 8.24 7.52   RBC 5.07 5.92 (A)   Hemoglobin 13.9 15.9   Hematocrit 43.0 50.4   MCV 84.8 85.1   MCH 27.4 26.9   MCHC 32.3 31.5   RDW 13.7 15.1   Platelets 255 348   Neutrophil Rel % 79.8 (A) 50.2   Immature Granulocyte Rel % 0.2 1.1 (A)   Lymphocyte Rel % 10.6 (A) 36.4   Monocyte Rel % 8.3 8.5   Eosinophil Rel % 0.6 2.9   Basophil Rel % 0.5 0.9   (A) Abnormal value            Lipid Panel    Lipid Panel 8/12/21   Total Cholesterol 180   Triglycerides 253 (A)   HDL Cholesterol 34 (A)   VLDL Cholesterol 43 (A)   LDL Cholesterol  103 (A)   LDL/HDL Ratio 2.81   (A) Abnormal value            TSH    TSH 8/12/21   TSH 2.890                  Assessment/Plan     Diagnoses and all orders for this visit:    1. COVID-19 (Primary)  Comments:  Symptomatic care was advised  Start antiviral therapy given comorbidities and increased risk of hospitalization  RTC if not improved within 1 week  Orders:  -     Molnupiravir (LAGEVRIO) 200 MG capsule; Take 4 capsules by mouth Every 12 (Twelve) Hours for 5 days.  Dispense: 40 capsule; Refill: 0    2. Gastroesophageal reflux disease with esophagitis without hemorrhage  Comments:  Change Carafate to liquid formulation for easier consumption  Continue pantoprazole  Advised to avoid known trigger foods  Orders:  -     sucralfate (Carafate) 1 GM/10ML suspension; Take 10 mL by mouth 4 (Four) Times a Day With Meals & at Bedtime.   Dispense: 420 mL; Refill: 0    3. History of CHF (congestive heart failure)  Comments:  Discussed congestive heart failure and comorbidities that make him an increased risk of hospitalization with Covid type symptoms    4. Hypothyroidism due to Hashimoto's thyroiditis  Comments:  Continue Synthroid 125 mcg daily            Patient was given instructions and counseling regarding his condition or for health maintenance advice. Please see specific information pulled into the AVS if appropriate      This document has been electronically signed by:  Tracee Santos PA-C

## 2022-02-04 NOTE — PATIENT INSTRUCTIONS

## 2022-02-10 ENCOUNTER — OFFICE VISIT (OUTPATIENT)
Dept: FAMILY MEDICINE CLINIC | Facility: CLINIC | Age: 42
End: 2022-02-10

## 2022-02-10 VITALS — HEIGHT: 69 IN | BODY MASS INDEX: 29.62 KG/M2 | WEIGHT: 200 LBS

## 2022-02-10 DIAGNOSIS — E78.2 MIXED HYPERLIPIDEMIA: Primary | Chronic | ICD-10-CM

## 2022-02-10 DIAGNOSIS — K21.9 GASTROESOPHAGEAL REFLUX DISEASE WITHOUT ESOPHAGITIS: Chronic | ICD-10-CM

## 2022-02-10 DIAGNOSIS — E06.3 HYPOTHYROIDISM DUE TO HASHIMOTO'S THYROIDITIS: Chronic | ICD-10-CM

## 2022-02-10 DIAGNOSIS — E03.8 HYPOTHYROIDISM DUE TO HASHIMOTO'S THYROIDITIS: Chronic | ICD-10-CM

## 2022-02-10 DIAGNOSIS — R73.03 PREDIABETES: Chronic | ICD-10-CM

## 2022-02-10 PROCEDURE — 99214 OFFICE O/P EST MOD 30 MIN: CPT | Performed by: PHYSICIAN ASSISTANT

## 2022-02-13 NOTE — PROGRESS NOTES
"Siomara Christian is a 41 y.o. male.     Video visit     You have chosen to receive care through a telehealth visit.  Do you consent to use a video/audio connection for your medical care today? Yes    Chief Complaint -hyperlipidemia    History of Present Illness -    ROS    Hyperlipidemia-  Stable with low-cholesterol diet    Hypothyroidism-stable Synthroid 125 mcg daily    Gastroesophageal reflux disease-stable with pantoprazole    Prediabetes-  Patient does complain of some burning in his feet that is worse with prolonged standing especially at his work for hours at a time.    The following portions of the patient's history were reviewed and updated as appropriate: allergies, current medications, past family history, past medical history, past social history, past surgical history and problem list.    Review of Systems    Objective  Vital signs:  Ht 175.3 cm (69.02\")   Wt 90.7 kg (200 lb)   BMI 29.52 kg/m²     Physical Exam  Vitals and nursing note reviewed.   Constitutional:       Appearance: Normal appearance. He is well-developed.   Eyes:      Extraocular Movements: Extraocular movements intact.      Conjunctiva/sclera: Conjunctivae normal.   Cardiovascular:      Rate and Rhythm: Normal rate and regular rhythm.      Heart sounds: Normal heart sounds. No murmur heard.      Pulmonary:      Effort: Pulmonary effort is normal. No respiratory distress.      Breath sounds: Normal breath sounds. No wheezing.   Musculoskeletal:         General: No tenderness.   Skin:     General: Skin is warm and dry.      Findings: No rash.   Neurological:      Mental Status: He is alert and oriented to person, place, and time.   Psychiatric:         Mood and Affect: Mood normal.         Behavior: Behavior normal.         Thought Content: Thought content normal.         The following data was reviewed by: JÚNIOR Nicholas on 02/10/2022:  CMP    CMP 8/12/21   Glucose 86   BUN 14   Creatinine 1.02   eGFR Non African Am 80 "   Sodium 135 (A)   Potassium 4.7   Chloride 100   Calcium 9.6   Albumin 4.90   Total Bilirubin 0.6   Alkaline Phosphatase 67   AST (SGOT) 27   ALT (SGPT) 31   (A) Abnormal value            CBC w/diff    CBC w/Diff 8/12/21   WBC 7.52   RBC 5.92 (A)   Hemoglobin 15.9   Hematocrit 50.4   MCV 85.1   MCH 26.9   MCHC 31.5   RDW 15.1   Platelets 348   Neutrophil Rel % 50.2   Immature Granulocyte Rel % 1.1 (A)   Lymphocyte Rel % 36.4   Monocyte Rel % 8.5   Eosinophil Rel % 2.9   Basophil Rel % 0.9   (A) Abnormal value            Lipid Panel    Lipid Panel 8/12/21   Total Cholesterol 180   Triglycerides 253 (A)   HDL Cholesterol 34 (A)   VLDL Cholesterol 43 (A)   LDL Cholesterol  103 (A)   LDL/HDL Ratio 2.81   (A) Abnormal value            TSH    TSH 8/12/21   TSH 2.890                      Assessment/Plan     Diagnoses and all orders for this visit:    1. Mixed hyperlipidemia (Primary)  Comments:  Advised low-cholesterol diet  Lab work ordered for further evaluation  Orders:  -     Comprehensive Metabolic Panel; Future  -     Lipid Panel; Future  -     Hemoglobin A1c; Future  -     CBC & Differential; Future    2. Hypothyroidism due to Hashimoto's thyroiditis  Comments:  Continue Synthroid 125 mcg daily  Orders:  -     TSH; Future  -     T4, Free; Future  -     CBC & Differential; Future    3. Prediabetes  Comments:  With possible associated peripheral neuropathy  Advised good shoes and low carbohydrate diet  Orders:  -     Hemoglobin A1c; Future  -     CBC & Differential; Future    4. Gastroesophageal reflux disease without esophagitis  Comments:  Continue Protonix  Advised to avoid known trigger foods            Patient was given instructions and counseling regarding his condition or for health maintenance advice. Please see specific information pulled into the AVS if appropriate      This document has been electronically signed by:  Tracee Santos PA-C

## 2022-02-13 NOTE — PATIENT INSTRUCTIONS
"Fat and Cholesterol Restricted Eating Plan  Getting too much fat and cholesterol in your diet may cause health problems. Choosing the right foods helps keep your fat and cholesterol at normal levels. This can keep you from getting certain diseases.  Your doctor may recommend an eating plan that includes:  · Total fat: ______% or less of total calories a day.  · Saturated fat: ______% or less of total calories a day.  · Cholesterol: less than _________mg a day.  · Fiber: ______g a day.  What are tips for following this plan?  Meal planning  · At meals, divide your plate into four equal parts:  ? Fill one-half of your plate with vegetables and green salads.  ? Fill one-fourth of your plate with whole grains.  ? Fill one-fourth of your plate with low-fat (lean) protein foods.  · Eat fish that is high in omega-3 fats at least two times a week. This includes mackerel, tuna, sardines, and salmon.  · Eat foods that are high in fiber, such as whole grains, beans, apples, broccoli, carrots, peas, and barley.  General tips    · Work with your doctor to lose weight if you need to.  · Avoid:  ? Foods with added sugar.  ? Fried foods.  ? Foods with partially hydrogenated oils.  · Limit alcohol intake to no more than 1 drink a day for nonpregnant women and 2 drinks a day for men. One drink equals 12 oz of beer, 5 oz of wine, or 1½ oz of hard liquor.    Reading food labels  · Check food labels for:  ? Trans fats.  ? Partially hydrogenated oils.  ? Saturated fat (g) in each serving.  ? Cholesterol (mg) in each serving.  ? Fiber (g) in each serving.  · Choose foods with healthy fats, such as:  ? Monounsaturated fats.  ? Polyunsaturated fats.  ? Omega-3 fats.  · Choose grain products that have whole grains. Look for the word \"whole\" as the first word in the ingredient list.  Cooking  · Cook foods using low-fat methods. These include baking, boiling, grilling, and broiling.  · Eat more home-cooked foods. Eat at restaurants and buffets " less often.  · Avoid cooking using saturated fats, such as butter, cream, palm oil, palm kernel oil, and coconut oil.  Recommended foods    Fruits  · All fresh, canned (in natural juice), or frozen fruits.  Vegetables  · Fresh or frozen vegetables (raw, steamed, roasted, or grilled). Green salads.  Grains  · Whole grains, such as whole wheat or whole grain breads, crackers, cereals, and pasta. Unsweetened oatmeal, bulgur, barley, quinoa, or brown rice. Corn or whole wheat flour tortillas.  Meats and other protein foods  · Ground beef (85% or leaner), grass-fed beef, or beef trimmed of fat. Skinless chicken or turkey. Ground chicken or turkey. Pork trimmed of fat. All fish and seafood. Egg whites. Dried beans, peas, or lentils. Unsalted nuts or seeds. Unsalted canned beans. Nut butters without added sugar or oil.  Dairy  · Low-fat or nonfat dairy products, such as skim or 1% milk, 2% or reduced-fat cheeses, low-fat and fat-free ricotta or cottage cheese, or plain low-fat and nonfat yogurt.  Fats and oils  · Tub margarine without trans fats. Light or reduced-fat mayonnaise and salad dressings. Avocado. Olive, canola, sesame, or safflower oils.  The items listed above may not be a complete list of foods and beverages you can eat. Contact a dietitian for more information.  Foods to avoid  Fruits  · Canned fruit in heavy syrup. Fruit in cream or butter sauce. Fried fruit.  Vegetables  · Vegetables cooked in cheese, cream, or butter sauce. Fried vegetables.  Grains  · White bread. White pasta. White rice. Cornbread. Bagels, pastries, and croissants. Crackers and snack foods that contain trans fat and hydrogenated oils.  Meats and other protein foods  · Fatty cuts of meat. Ribs, chicken wings, jean, sausage, bologna, salami, chitterlings, fatback, hot dogs, bratwurst, and packaged lunch meats. Liver and organ meats. Whole eggs and egg yolks. Chicken and turkey with skin. Fried meat.  Dairy  · Whole or 2% milk, cream,  half-and-half, and cream cheese. Whole milk cheeses. Whole-fat or sweetened yogurt. Full-fat cheeses. Nondairy creamers and whipped toppings. Processed cheese, cheese spreads, and cheese curds.  Beverages  · Alcohol. Sugar-sweetened drinks such as sodas, lemonade, and fruit drinks.  Fats and oils  · Butter, stick margarine, lard, shortening, ghee, or jean fat. Coconut, palm kernel, and palm oils.  Sweets and desserts  · Corn syrup, sugars, honey, and molasses. Candy. Jam and jelly. Syrup. Sweetened cereals. Cookies, pies, cakes, donuts, muffins, and ice cream.  The items listed above may not be a complete list of foods and beverages you should avoid. Contact a dietitian for more information.  Summary  · Choosing the right foods helps keep your fat and cholesterol at normal levels. This can keep you from getting certain diseases.  · At meals, fill one-half of your plate with vegetables and green salads.  · Eat high-fiber foods, like whole grains, beans, apples, carrots, peas, and barley.  · Limit added sugar, saturated fats, alcohol, and fried foods.  This information is not intended to replace advice given to you by your health care provider. Make sure you discuss any questions you have with your health care provider.  Document Revised: 04/21/2021 Document Reviewed: 04/21/2021  [employer] Patient Education © 2021 [employer]      Fall Prevention in the Home, Adult  Falls can cause injuries. They can happen to people of all ages. There are many things you can do to make your home safe and to help prevent falls. Ask for help when making these changes, if needed.  What actions can I take to prevent falls?  General Instructions  · Use good lighting in all rooms. Replace any light bulbs that burn out.  · Turn on the lights when you go into a dark area. Use night-lights.  · Keep items that you use often in easy-to-reach places. Lower the shelves around your home if necessary.  · Set up your furniture so you have a clear  path. Avoid moving your furniture around.  · Do not have throw rugs and other things on the floor that can make you trip.  · Avoid walking on wet floors.  · If any of your floors are uneven, fix them.  · Add color or contrast paint or tape to clearly mehnaz and help you see:  ? Any grab bars or handrails.  ? First and last steps of stairways.  ? Where the edge of each step is.  · If you use a stepladder:  ? Make sure that it is fully opened. Do not climb a closed stepladder.  ? Make sure that both sides of the stepladder are locked into place.  ? Ask someone to hold the stepladder for you while you use it.  · If there are any pets around you, be aware of where they are.  What can I do in the bathroom?         · Keep the floor dry. Clean up any water that spills onto the floor as soon as it happens.  · Remove soap buildup in the tub or shower regularly.  · Use non-skid mats or decals on the floor of the tub or shower.  · Attach bath mats securely with double-sided, non-slip rug tape.  · If you need to sit down in the shower, use a plastic, non-slip stool.  · Install grab bars by the toilet and in the tub and shower. Do not use towel bars as grab bars.  What can I do in the bedroom?  · Make sure that you have a light by your bed that is easy to reach.  · Do not use any sheets or blankets that are too big for your bed. They should not hang down onto the floor.  · Have a firm chair that has side arms. You can use this for support while you get dressed.  What can I do in the kitchen?  · Clean up any spills right away.  · If you need to reach something above you, use a strong step stool that has a grab bar.  · Keep electrical cords out of the way.  · Do not use floor polish or wax that makes floors slippery. If you must use wax, use non-skid floor wax.  What can I do with my stairs?  · Do not leave any items on the stairs.  · Make sure that you have a light switch at the top of the stairs and the bottom of the stairs. If  you do not have them, ask someone to add them for you.  · Make sure that there are handrails on both sides of the stairs, and use them. Fix handrails that are broken or loose. Make sure that handrails are as long as the stairways.  · Install non-slip stair treads on all stairs in your home.  · Avoid having throw rugs at the top or bottom of the stairs. If you do have throw rugs, attach them to the floor with carpet tape.  · Choose a carpet that does not hide the edge of the steps on the stairway.  · Check any carpeting to make sure that it is firmly attached to the stairs. Fix any carpet that is loose or worn.  What can I do on the outside of my home?  · Use bright outdoor lighting.  · Regularly fix the edges of walkways and driveways and fix any cracks.  · Remove anything that might make you trip as you walk through a door, such as a raised step or threshold.  · Trim any bushes or trees on the path to your home.  · Regularly check to see if handrails are loose or broken. Make sure that both sides of any steps have handrails.  · Install guardrails along the edges of any raised decks and porches.  · Clear walking paths of anything that might make someone trip, such as tools or rocks.  · Have any leaves, snow, or ice cleared regularly.  · Use sand or salt on walking paths during winter.  · Clean up any spills in your garage right away. This includes grease or oil spills.  What other actions can I take?  · Wear shoes that:  ? Have a low heel. Do not wear high heels.  ? Have rubber bottoms.  ? Are comfortable and fit you well.  ? Are closed at the toe. Do not wear open-toe sandals.  · Use tools that help you move around (mobility aids) if they are needed. These include:  ? Canes.  ? Walkers.  ? Scooters.  ? Crutches.  · Review your medicines with your doctor. Some medicines can make you feel dizzy. This can increase your chance of falling.  Ask your doctor what other things you can do to help prevent falls.  Where to  find more information  · Centers for Disease Control and Prevention, [employer]: https://cdc.gov  · National [place] [employer] Aging: https://rd2hrqx.jazmín.nih.gov  Contact a doctor if:  · You are afraid of falling at home.  · You feel weak, drowsy, or dizzy at home.  · You fall at home.  Summary  · There are many simple things that you can do to make your home safe and to help prevent falls.  · Ways to make your home safe include removing tripping hazards and installing grab bars in the bathroom.  · Ask for help when making these changes in your home.  This information is not intended to replace advice given to you by your health care provider. Make sure you discuss any questions you have with your health care provider.  Document Revised: 04/09/2020 Document Reviewed: 08/02/2018  [employer] Patient Education © 2021 [employer].

## 2022-02-24 ENCOUNTER — OFFICE VISIT (OUTPATIENT)
Dept: FAMILY MEDICINE CLINIC | Facility: CLINIC | Age: 42
End: 2022-02-24

## 2022-02-24 VITALS — BODY MASS INDEX: 29.62 KG/M2 | WEIGHT: 200 LBS | HEIGHT: 69 IN

## 2022-02-24 DIAGNOSIS — Z86.79 HISTORY OF CHF (CONGESTIVE HEART FAILURE): Chronic | ICD-10-CM

## 2022-02-24 DIAGNOSIS — E06.3 HYPOTHYROIDISM DUE TO HASHIMOTO'S THYROIDITIS: Chronic | ICD-10-CM

## 2022-02-24 DIAGNOSIS — E03.8 HYPOTHYROIDISM DUE TO HASHIMOTO'S THYROIDITIS: Chronic | ICD-10-CM

## 2022-02-24 DIAGNOSIS — E78.2 MIXED HYPERLIPIDEMIA: Chronic | ICD-10-CM

## 2022-02-24 DIAGNOSIS — J40 BRONCHITIS: Primary | ICD-10-CM

## 2022-02-24 PROCEDURE — 99214 OFFICE O/P EST MOD 30 MIN: CPT | Performed by: PHYSICIAN ASSISTANT

## 2022-02-24 RX ORDER — AZITHROMYCIN 250 MG/1
TABLET, FILM COATED ORAL
Qty: 6 TABLET | Refills: 0 | Status: SHIPPED | OUTPATIENT
Start: 2022-02-24 | End: 2022-05-26

## 2022-02-24 RX ORDER — DEXTROMETHORPHAN HYDROBROMIDE AND PROMETHAZINE HYDROCHLORIDE 15; 6.25 MG/5ML; MG/5ML
5 SYRUP ORAL 4 TIMES DAILY PRN
Qty: 180 ML | Refills: 0 | Status: SHIPPED | OUTPATIENT
Start: 2022-02-24 | End: 2022-05-26

## 2022-02-24 NOTE — PROGRESS NOTES
"Siomara Christian is a 42 y.o. male.     Video Visit    You have chosen to receive care through a telehealth visit.  Do you consent to use a video/audio connection for your medical care today? Yes    Chief Complaint -cough    History of Present Illness -    ROS    Cough-  Patient complains of productive cough with persistent chest congestion and nasal congestion that began approximately 7 days ago.  He did have Covid symptoms, was treated aggressively with Mulnupirvavir, and states his generalized symptoms and shortness of breath have improved.  He does have history of congestive heart failure.    Hyperlipidemia-  Stable with a low-cholesterol diet    Hypothyroidism-stable with Synthroid    The following portions of the patient's history were reviewed and updated as appropriate: allergies, current medications, past family history, past medical history, past social history, past surgical history and problem list.    Review of Systems    Objective  Vital signs:  Ht 175.3 cm (69.02\")   Wt 90.7 kg (200 lb)   BMI 29.52 kg/m²     Physical Exam  Vitals and nursing note reviewed.   Constitutional:       Appearance: Normal appearance.   HENT:      Head: Normocephalic and atraumatic.   Eyes:      Extraocular Movements: Extraocular movements intact.      Conjunctiva/sclera: Conjunctivae normal.   Pulmonary:      Effort: Pulmonary effort is normal.      Breath sounds: No wheezing.      Comments: Patient is coughing during today's visit  Musculoskeletal:      Cervical back: Normal range of motion and neck supple.   Skin:     General: Skin is dry.      Findings: No erythema or rash.   Neurological:      General: No focal deficit present.      Mental Status: He is alert and oriented to person, place, and time.   Psychiatric:         Mood and Affect: Mood normal.         Thought Content: Thought content normal.         The following data was reviewed by: JÚNIOR Nicholas on 02/24/2022:  CMP    CMP 8/12/21   Glucose " 86   BUN 14   Creatinine 1.02   eGFR Non African Am 80   Sodium 135 (A)   Potassium 4.7   Chloride 100   Calcium 9.6   Albumin 4.90   Total Bilirubin 0.6   Alkaline Phosphatase 67   AST (SGOT) 27   ALT (SGPT) 31   (A) Abnormal value            CBC w/diff    CBC w/Diff 8/12/21   WBC 7.52   RBC 5.92 (A)   Hemoglobin 15.9   Hematocrit 50.4   MCV 85.1   MCH 26.9   MCHC 31.5   RDW 15.1   Platelets 348   Neutrophil Rel % 50.2   Immature Granulocyte Rel % 1.1 (A)   Lymphocyte Rel % 36.4   Monocyte Rel % 8.5   Eosinophil Rel % 2.9   Basophil Rel % 0.9   (A) Abnormal value            TSH    TSH 8/12/21   TSH 2.890           Lab Results   Component Value Date    CHOL 180 08/12/2021     Lab Results   Component Value Date    TRIG 253 (H) 08/12/2021     Lab Results   Component Value Date    HDL 34 (L) 08/12/2021     Lab Results   Component Value Date     (H) 08/12/2021              Assessment/Plan     Diagnoses and all orders for this visit:    1. Bronchitis (Primary)  Comments:  Start azithromycin and Promethazine DM cough syrup  Conservative measures advised  Finish antiviral therapy  RTC if symptoms not improving in 5 days  Orders:  -     azithromycin (Zithromax) 250 MG tablet; Take 2 tablets the first day, then 1 tablet daily for 4 days.  Dispense: 6 tablet; Refill: 0  -     promethazine-dextromethorphan (PROMETHAZINE-DM) 6.25-15 MG/5ML syrup; Take 5 mL by mouth 4 (Four) Times a Day As Needed for Cough.  Dispense: 180 mL; Refill: 0    2. History of CHF (congestive heart failure)  Comments:  Continue to monitor and treat symptomatically    3. Mixed hyperlipidemia  Comments:  Advised low-cholesterol diet  Continue to monitor    4. Hypothyroidism due to Hashimoto's thyroiditis  Comments:  Continue Synthroid  Continue to monitor            Patient was given instructions and counseling regarding his condition or for health maintenance advice. Please see specific information pulled into the AVS if appropriate      This  document has been electronically signed by:  Tracee Santos PA-C

## 2022-02-27 NOTE — PATIENT INSTRUCTIONS
Acute Bronchitis, Adult    Acute bronchitis is when air tubes in the lungs (bronchi) suddenly get swollen. The condition can make it hard for you to breathe. In adults, acute bronchitis usually goes away within 2 weeks. A cough caused by bronchitis may last up to 3 weeks. Smoking, allergies, and asthma can make the condition worse.  What are the causes?  This condition is caused by:  · Cold and flu viruses. The most common cause of this condition is the virus that causes the common cold.  · Bacteria.  · Substances that irritate the lungs, including:  ? Smoke from cigarettes and other types of tobacco.  ? Dust and pollen.  ? Fumes from chemicals, gases, or burned fuel.  ? Other materials that pollute indoor or outdoor air.  · Close contact with someone who has acute bronchitis.  What increases the risk?  The following factors may make you more likely to develop this condition:  · A weak body's defense system. This is also called the immune system.  · Any condition that affects your lungs and breathing, such as asthma.  What are the signs or symptoms?  Symptoms of this condition include:  · A cough.  · Coughing up clear, yellow, or green mucus.  · Wheezing.  · Chest congestion.  · Shortness of breath.  · A fever.  · Body aches.  · Chills.  · A sore throat.  How is this treated?  Acute bronchitis may go away over time without treatment. Your doctor may recommend:  · Drinking more fluids.  · Taking a medicine for a fever or cough.  · Using a device that gets medicine into your lungs (inhaler).  · Using a vaporizer or a humidifier. These are machines that add water or moisture in the air to help with coughing and poor breathing.  Follow these instructions at home:    Activity  · Get a lot of rest.  · Avoid places where there are fumes from chemicals.  · Return to your normal activities as told by your doctor. Ask your doctor what activities are safe for you.  Lifestyle  · Drink enough fluids to keep your pee (urine) pale  yellow.  · Do not drink alcohol.  · Do not use any products that contain nicotine or tobacco, such as cigarettes, e-cigarettes, and chewing tobacco. If you need help quitting, ask your doctor. Be aware that:  ? Your bronchitis will get worse if you smoke or breathe in other people's smoke (secondhand smoke).  ? Your lungs will heal faster if you quit smoking.  General instructions  · Take over-the-counter and prescription medicines only as told by your doctor.  · Use an inhaler, cool mist vaporizer, or humidifier as told by your doctor.  · Rinse your mouth often with salt water. To make salt water, dissolve ½-1 tsp (3-6 g) of salt in 1 cup (237 mL) of warm water.  · Keep all follow-up visits as told by your doctor. This is important.  How is this prevented?  To lower your risk of getting this condition again:  · Wash your hands often with soap and water. If soap and water are not available, use hand .  · Avoid contact with people who have cold symptoms.  · Try not to touch your mouth, nose, or eyes with your hands.  · Make sure to get the flu shot every year.  Contact a doctor if:  · Your symptoms do not get better in 2 weeks.  · You vomit more than once or twice.  · You have symptoms of loss of fluid from your body (dehydration). These include:  ? Dark urine.  ? Dry skin or eyes.  ? Increased thirst.  ? Headaches.  ? Confusion.  ? Muscle cramps.  Get help right away if:  · You cough up blood.  · You have chest pain.  · You have very bad shortness of breath.  · You become dehydrated.  · You faint or keep feeling like you are going to faint.  · You keep vomiting.  · You have a very bad headache.  · Your fever or chills get worse.  These symptoms may be an emergency. Do not wait to see if the symptoms will go away. Get medical help right away. Call your local emergency services (911 in the [place] Do not drive yourself to the hospital.  Summary  · Acute bronchitis is when air tubes in the lungs (bronchi)  suddenly get swollen. In adults, acute bronchitis usually goes away within 2 weeks.  · Take over-the-counter and prescription medicines only as told by your doctor.  · Drink enough fluid to keep your pee (urine) pale yellow.  · Contact a doctor if your symptoms do not improve after 2 weeks of treatment.  · Get help right away if you cough up blood, faint, or have chest pain or shortness of breath.  This information is not intended to replace advice given to you by your health care provider. Make sure you discuss any questions you have with your health care provider.  Document Revised: 07/10/2020 Document Reviewed: 07/10/2020  [employer] Patient Education © 2021 [employer]

## 2022-03-24 NOTE — PROGRESS NOTES
"Chief Complaint  Hematuria    Subjective    History of Present Illness      Nick Christian presents to [facility] for hematuria  He is a 40-year-old male who presents for hematuria.    History of Present Illness     Hematuria-  He complains of gross hematuria 2 days ago.  Patient states that yesterday he had left blood in his urine.  Urinalysis today was reviewed and negative for blood.  The patient has a strong family history of cancer including pancreatic and bladder cancer in multiple relatives including his father, grandfather aunts and uncles.      Hyperlipidemia-not at goal with fenofibrate.  He states that he has not been taking the fenofibrate with his do not have a refill on this medication    Hypothyroidism-stable with Synthroid 125 mcg daily  Lab Results   Component Value Date    TSH 7.54 (H) 01/25/2018     Hypoglycemia-  He does have a history of prediabetes.  He reports that he has not tested his sugar recently at home.  This is due to be rechecked.    Gastroesophageal reflux disease-stable with pantoprazole    Objective   Vital Signs:   /70   Pulse 78   Temp 97.1 °F (36.2 °C) (Temporal)   Ht 175.3 cm (69\")   Wt 94.8 kg (209 lb)   SpO2 97%   BMI 30.86 kg/m²     Physical Exam   Result Review :                               Assessment and Plan    Problem List Items Addressed This Visit        Cardiac and Vasculature    Mixed hyperlipidemia    Overview     Discontinue fenofibrate since he has not taken it in several months anyway  Plan to recheck lipids and see if he needs medication  Advise low-cholesterol low carbohydrate diet         Relevant Orders    Comprehensive Metabolic Panel    Lipid Panel       Endocrine and Metabolic    Hypothyroidism due to Hashimoto's thyroiditis    Relevant Medications    levothyroxine (SYNTHROID, LEVOTHROID) 125 MCG tablet    Other Relevant Orders    TSH    T4, Free    Hyperglycemia    Relevant Orders    Hemoglobin A1c      Other " Visit Diagnoses     Hematuria, unspecified type    -  Primary    Relevant Orders    POCT urinalysis dipstick, automated (Completed)    Gross hematuria        Relevant Orders    Ambulatory Referral to Urology    Family history of pancreatic cancer        Relevant Orders    Amylase    Lipase    Gastroesophageal reflux disease without esophagitis        Continue pantoprazole    Relevant Medications    pantoprazole (PROTONIX) 40 MG EC tablet        I spent 25 minutes caring for Nick on this date of service. This time includes time spent by me in the following activities:preparing for the visit, reviewing tests, obtaining and/or reviewing a separately obtained history, performing a medically appropriate examination and/or evaluation , counseling and educating the patient/family/caregiver, ordering medications, tests, or procedures, documenting information in the medical record, independently interpreting results and communicating that information with the patient/family/caregiver and care coordination  Follow Up   Return in about 3 months (around 3/31/2021) for Followup with Rajwinder Easley just prior to return.  Patient was given instructions and counseling regarding his condition or for health maintenance advice. Please see specific information pulled into the AVS if appropriate.        Subjective  HPI: 38yoF  at 39w presents for IOL for T2DM. Pt was diagnosed at 9wga with an + early GCT in the setting of known history of prediabetics A1C was 6.4% at that time. Pt reports well controlled sugars on current regimen with fasting ranging 75-85 and post prandial ranging 120-130. +FM. -LOF. -CTXs. -VB. Pt denies any other concerns.    – PNC:  Regular prenatal care.  T2DM as described above. GBS neg.   EFW 3572 g by sono.  – OBHx: #1 - FT  in  7# Denies complications in pregnancy / labor course.   #2 - FT  in  10# Denies complications in pregnancy / labor course.   #3-5 -  MAB in , , 2018. Reports 2x D&Cs and 1 medical  - pt unsure of the order of these events.   #6 - Pt reports no hospitalizations, procedures, or infections in pregnancy. Pt denies complications with blood pressure. T2DM as described above.   – GynHx: denies  – PMH: denies  – PSH: Appendectomy at age 12   – Psych: denies   – Social: denies   – Meds: PNV.  Humalin 22uQHS. Lispro 45u for breakfast and  55u for dinner. Pt does not eat lunch   – Allergies: NKDA  – Will accept blood transfusions? Yes    Objective  Vital Signs Last 24 Hrs  T(C): --  T(F): --  HR: 85 (24 Mar 2022 21:03) (85 - 85)  BP: 110/61 (24 Mar 2022 21:03) (110/61 - 110/61)  BP(mean): --  RR: --  SpO2: --    – PE:   CV: RRR  Pulm: breathing comfortably on RA  Abd: gravid, nontender  Extr: moving all extremities with ease  – FS: 76  – Spec: pooling, nitrazine, ferning, bleeding,  (lesions if patient with HSV2 history)  – VE: .5/50/-3  – FHT: baseline 120, mod variability, +accels, -decels  – [place]: None   – EFW: 3572g by sono  – Sono: vertex     Subjective  HPI: 38yoF  at 38w presents for IOL for T2DM. Pt was diagnosed at 9wga with an + early GCT in the setting of known history of prediabetics A1C was 6.4% at that time. Pt reports well controlled sugars on current regimen with fasting ranging 75-85 and post prandial ranging 120-130. +FM. -LOF. -CTXs. -VB. Pt denies any other concerns.    – PNC:  Regular prenatal care.  T2DM as described above. GBS neg.   EFW 3572 g by sono.  – OBHx: #1 - FT  in  7# Denies complications in pregnancy / labor course.   #2 - FT  in  10# Denies complications in pregnancy / labor course.   #3-5 -  MAB in , , 2018. Reports 2x D&Cs and 1 MAB treated medically - pt unsure of the order of these events.   #6 - Pt reports no hospitalizations, procedures, or infections in pregnancy. Pt denies complications with blood pressure. T2DM as described above.   – GynHx: denies  – PMH: denies  – PSH: Appendectomy at age 12   – Psych: denies   – Social: denies   – Meds: PNV.  Humalin 22uQHS. Lispro 45u for breakfast and  55u for dinner. Pt does not eat lunch   – Allergies: NKDA  – Will accept blood transfusions? Yes    Objective  Vital Signs Last 24 Hrs  T(C): --  T(F): --  HR: 85 (24 Mar 2022 21:03) (85 - 85)  BP: 110/61 (24 Mar 2022 21:03) (110/61 - 110/61)  BP(mean): --  RR: --  SpO2: --    – PE:   CV: RRR  Pulm: breathing comfortably on RA  Abd: gravid, nontender  Extr: moving all extremities with ease  – FS: 76  – VE: .5/50/-3  – FHT: baseline 120, mod variability, +accels, -decels  – [place]: None   – EFW: 3572g by sono  – Sono: vertex     HPI: 38yoF  at 39w presents for IOL for T2DM. Pt was diagnosed at 9wga with an + early GCT in the setting of known history of prediabetics A1C was 6.4% at that time. Pt reports well controlled sugars on current regimen with fasting ranging 75-85 and post prandial ranging 120-130. +FM. -LOF. -CTXs. -VB. Pt denies any other concerns.    – PNC:  Regular prenatal care.  T2DM as described above. GBS neg.   EFW 3572 g by sono.  – OBHx: #1 - FT  in  7# Denies complications in pregnancy / labor course.   #2 - FT  in  10# Denies complications in pregnancy / labor course.   #3-5 -  MAB in , , 2018. Reports 2x D&Cs and 1 MAB treated medically - pt unsure of the order of these events.   #6 - Pt reports no hospitalizations, procedures, or infections in pregnancy. Pt denies complications with blood pressure. T2DM as described above.   – GynHx: denies  – PMH: denies  – PSH: Appendectomy at age 12   – Psych: denies   – Social: denies   – Meds: PNV.  Humalin 22uQHS. Lispro 45u for breakfast and  55u for dinner. Pt does not eat lunch   – Allergies: NKDA  – Will accept blood transfusions? Yes    Objective  Vital Signs Last 24 Hrs  T(C): --  T(F): --  HR: 85 (24 Mar 2022 21:03) (85 - 85)  BP: 110/61 (24 Mar 2022 21:03) (110/61 - 110/61)  BP(mean): --  RR: --  SpO2: --    – PE:   CV: RRR  Pulm: breathing comfortably on RA  Abd: gravid, nontender  Extr: moving all extremities with ease  – FS: 76  – VE: .5/50/-3  – FHT: baseline 120, mod variability, +accels, -decels  – [place]: None   – EFW: 3572g by sono  – Sono: vertex

## 2022-03-30 DIAGNOSIS — E06.3 HYPOTHYROIDISM DUE TO HASHIMOTO'S THYROIDITIS: ICD-10-CM

## 2022-03-30 DIAGNOSIS — E03.8 HYPOTHYROIDISM DUE TO HASHIMOTO'S THYROIDITIS: ICD-10-CM

## 2022-03-30 RX ORDER — LEVOTHYROXINE SODIUM 0.12 MG/1
125 TABLET ORAL DAILY
Qty: 30 TABLET | Refills: 5 | Status: SHIPPED | OUTPATIENT
Start: 2022-03-30 | End: 2022-07-28

## 2022-05-19 DIAGNOSIS — K21.00 GASTROESOPHAGEAL REFLUX DISEASE WITH ESOPHAGITIS WITHOUT HEMORRHAGE: Chronic | ICD-10-CM

## 2022-05-19 RX ORDER — SUCRALFATE 1 G/1
TABLET ORAL
Qty: 120 TABLET | Refills: 0 | Status: SHIPPED | OUTPATIENT
Start: 2022-05-19 | End: 2022-05-26 | Stop reason: SDUPTHER

## 2022-05-26 ENCOUNTER — OFFICE VISIT (OUTPATIENT)
Dept: FAMILY MEDICINE CLINIC | Facility: CLINIC | Age: 42
End: 2022-05-26

## 2022-05-26 VITALS
BODY MASS INDEX: 29.33 KG/M2 | SYSTOLIC BLOOD PRESSURE: 116 MMHG | TEMPERATURE: 96.8 F | WEIGHT: 198 LBS | OXYGEN SATURATION: 99 % | HEART RATE: 84 BPM | DIASTOLIC BLOOD PRESSURE: 82 MMHG | HEIGHT: 69 IN

## 2022-05-26 DIAGNOSIS — G89.29 CHRONIC RIGHT SHOULDER PAIN: ICD-10-CM

## 2022-05-26 DIAGNOSIS — K21.00 GASTROESOPHAGEAL REFLUX DISEASE WITH ESOPHAGITIS WITHOUT HEMORRHAGE: Chronic | ICD-10-CM

## 2022-05-26 DIAGNOSIS — E06.3 HYPOTHYROIDISM DUE TO HASHIMOTO'S THYROIDITIS: ICD-10-CM

## 2022-05-26 DIAGNOSIS — E03.8 HYPOTHYROIDISM DUE TO HASHIMOTO'S THYROIDITIS: ICD-10-CM

## 2022-05-26 DIAGNOSIS — M25.511 CHRONIC RIGHT SHOULDER PAIN: ICD-10-CM

## 2022-05-26 DIAGNOSIS — K21.00 GASTROESOPHAGEAL REFLUX DISEASE WITH ESOPHAGITIS WITHOUT HEMORRHAGE: Primary | ICD-10-CM

## 2022-05-26 PROCEDURE — 99214 OFFICE O/P EST MOD 30 MIN: CPT | Performed by: PHYSICIAN ASSISTANT

## 2022-05-26 RX ORDER — SUCRALFATE 1 G/1
1 TABLET ORAL 4 TIMES DAILY
Qty: 120 TABLET | Refills: 0 | Status: SHIPPED | OUTPATIENT
Start: 2022-05-26

## 2022-05-26 RX ORDER — PANTOPRAZOLE SODIUM 40 MG/1
40 TABLET, DELAYED RELEASE ORAL DAILY
Qty: 30 TABLET | Refills: 5 | Status: SHIPPED | OUTPATIENT
Start: 2022-05-26 | End: 2022-12-20

## 2022-05-29 NOTE — PROGRESS NOTES
"Siomara Christian is a 42 y.o. male.       Chief Complaint -acid reflux    History of Present Illness -    ROS    Acid reflux-  Patient reports acid reflux from gastroesophageal reflux disease is controlled with the use of Carafate and pantoprazole.  He reports both medications are required for reflux control.    Hypothyroidism-stable with Synthroid 125 mcg daily    Shoulder pain-  Patient complains of chronic right shoulder pain.  He reports shoulder pain is intermittent moderate and sharp.  He denies any known new injury.  He has history of rotator cuff problems.    The following portions of the patient's history were reviewed and updated as appropriate: allergies, current medications, past family history, past medical history, past social history, past surgical history and problem list.    Review of Systems    Objective  Vital signs:  /82   Pulse 84   Temp 96.8 °F (36 °C) (Temporal)   Ht 175.3 cm (69.02\")   Wt 89.8 kg (198 lb)   SpO2 99%   BMI 29.22 kg/m²     Physical Exam  Vitals and nursing note reviewed.   Constitutional:       Appearance: Normal appearance. He is well-developed.   Eyes:      Extraocular Movements: Extraocular movements intact.      Conjunctiva/sclera: Conjunctivae normal.   Cardiovascular:      Rate and Rhythm: Normal rate and regular rhythm.      Heart sounds: Normal heart sounds. No murmur heard.  Pulmonary:      Effort: Pulmonary effort is normal. No respiratory distress.      Breath sounds: Normal breath sounds. No wheezing.   Musculoskeletal:         General: Tenderness present.      Comments: Diffuse tenderness noted right shoulder with palpation.  No significant erythema or swelling appreciated.   Skin:     General: Skin is warm and dry.      Findings: No rash.   Neurological:      Mental Status: He is alert and oriented to person, place, and time.   Psychiatric:         Mood and Affect: Mood normal.         Behavior: Behavior normal.         Thought Content: " Thought content normal.         The following data was reviewed by: JÚNIOR Nicholas on 05/26/2022:  CMP    CMP 8/12/21   Glucose 86   BUN 14   Creatinine 1.02   eGFR Non African Am 80   Sodium 135 (A)   Potassium 4.7   Chloride 100   Calcium 9.6   Albumin 4.90   Total Bilirubin 0.6   Alkaline Phosphatase 67   AST (SGOT) 27   ALT (SGPT) 31   (A) Abnormal value            CBC w/diff    CBC w/Diff 8/12/21   WBC 7.52   RBC 5.92 (A)   Hemoglobin 15.9   Hematocrit 50.4   MCV 85.1   MCH 26.9   MCHC 31.5   RDW 15.1   Platelets 348   Neutrophil Rel % 50.2   Immature Granulocyte Rel % 1.1 (A)   Lymphocyte Rel % 36.4   Monocyte Rel % 8.5   Eosinophil Rel % 2.9   Basophil Rel % 0.9   (A) Abnormal value            Lipid Panel    Lipid Panel 8/12/21   Total Cholesterol 180   Triglycerides 253 (A)   HDL Cholesterol 34 (A)   VLDL Cholesterol 43 (A)   LDL Cholesterol  103 (A)   LDL/HDL Ratio 2.81   (A) Abnormal value            TSH    TSH 8/12/21   TSH 2.890                  Assessment & Plan     Diagnoses and all orders for this visit:    1. Gastroesophageal reflux disease with esophagitis without hemorrhage (Primary)  -     Ambulatory Referral to Gastroenterology  -     pantoprazole (PROTONIX) 40 MG EC tablet; Take 1 tablet by mouth Daily.  Dispense: 30 tablet; Refill: 5  -     sucralfate (CARAFATE) 1 g tablet; Take 1 tablet by mouth 4 (Four) Times a Day.  Dispense: 120 tablet; Refill: 0    2. Hypothyroidism due to Hashimoto's thyroiditis  -     Ambulatory Referral to Endocrinology    3. Gastroesophageal reflux disease with esophagitis without hemorrhage  Comments:  Discussed differential diagnosis including possible PUD, esophagitis, or H. pylori  Start Carafate  Discussed possible referral to GI if needed  Orders:  -     Ambulatory Referral to Gastroenterology  -     pantoprazole (PROTONIX) 40 MG EC tablet; Take 1 tablet by mouth Daily.  Dispense: 30 tablet; Refill: 5  -     sucralfate (CARAFATE) 1 g tablet; Take 1 tablet  by mouth 4 (Four) Times a Day.  Dispense: 120 tablet; Refill: 0    4. Chronic right shoulder pain  -     XR Shoulder 2+ View Right; Future  -     Ambulatory Referral to Orthopedic Surgery            Patient was given instructions and counseling regarding his condition or for health maintenance advice. Please see specific information pulled into the AVS if appropriate      This document has been electronically signed by:  Tracee Santos PA-C

## 2022-06-02 ENCOUNTER — LAB (OUTPATIENT)
Dept: LAB | Facility: HOSPITAL | Age: 42
End: 2022-06-02

## 2022-06-02 ENCOUNTER — OFFICE VISIT (OUTPATIENT)
Dept: ENDOCRINOLOGY | Facility: CLINIC | Age: 42
End: 2022-06-02

## 2022-06-02 VITALS
DIASTOLIC BLOOD PRESSURE: 78 MMHG | HEART RATE: 116 BPM | BODY MASS INDEX: 30.81 KG/M2 | HEIGHT: 69 IN | OXYGEN SATURATION: 98 % | SYSTOLIC BLOOD PRESSURE: 148 MMHG | WEIGHT: 208 LBS

## 2022-06-02 DIAGNOSIS — E03.8 HYPOTHYROIDISM DUE TO HASHIMOTO'S THYROIDITIS: Primary | ICD-10-CM

## 2022-06-02 DIAGNOSIS — E06.3 HYPOTHYROIDISM DUE TO HASHIMOTO'S THYROIDITIS: Primary | ICD-10-CM

## 2022-06-02 PROCEDURE — 84443 ASSAY THYROID STIM HORMONE: CPT | Performed by: NURSE PRACTITIONER

## 2022-06-02 PROCEDURE — 99213 OFFICE O/P EST LOW 20 MIN: CPT | Performed by: NURSE PRACTITIONER

## 2022-06-02 PROCEDURE — 36415 COLL VENOUS BLD VENIPUNCTURE: CPT | Performed by: NURSE PRACTITIONER

## 2022-06-02 NOTE — PROGRESS NOTES
Chief Complaint   Patient presents with   • Hypothyroidism        Referring Provider  No ref. provider found     HPI   Nick Christian is a 42 y.o. male had concerns including Hypothyroidism.   Seen as new patient today.    He was diagnosed with hypothyroidism in 2008.  He was in the hospital for a viral infection.  He continued to have some other complications.  He went in to [facility].  He went back in to the hospital.  During that time he found out that he had a thyroid issue.  He was placed on T4 then.  He has been on T4 since then.    He has not had an US since 2012.  He is currently taking T4 125 mcg daily.  He is taking his medication appropriately and not missing any doses.    Birth state: [place]  Previous history of radiation to face/neck: none  Consuming foods high in iodine: none  Family history of thyroid complications: unknown    The following portions of the patient's history were reviewed and updated as appropriate: allergies, current medications, past family history, past medical history, past social history, past surgical history and problem list.    Past Medical History:   Diagnosis Date   • Arnold-Chiari deformity (HCC)    • Arrhythmia    • Arthritis    • Coronary artery disease    • Perthes disease    • Sleep apnea    • Stroke (HCC)    • Thyroid disease      Past Surgical History:   Procedure Laterality Date   • GALLBLADDER SURGERY     • HERNIA REPAIR     • HIP BIPOLAR REPLACEMENT  2019      Family History   Problem Relation Age of Onset   • Heart disease Maternal Grandfather    • Diabetes Maternal Grandfather    • Heart disease Paternal Grandfather    • Stroke Paternal Grandfather    • Rheumatologic disease Mother    • Diabetes Mother    • Hypertension Mother    • Obesity Mother    • Asthma Mother    • Diabetes Father    • Cancer Father         PANCREATIC   • Sleep apnea Father    • Hypersomnolence Father    • Asthma Father    • Hypertension Maternal Grandmother    • Thyroid disease Paternal Grandmother      "  Social History     Socioeconomic History   • Marital status:    Tobacco Use   • Smoking status: Former Smoker     Packs/day: 1.00     Types: Cigarettes     Quit date:      Years since quittin.4   • Smokeless tobacco: Former User     Types: Chew   Vaping Use   • Vaping Use: Never used   Substance and Sexual Activity   • Alcohol use: Yes     Comment: occas   • Drug use: No   • Sexual activity: Defer      Allergies   Allergen Reactions   • Morphine Mental Status Change      Current Outpatient Medications on File Prior to Visit   Medication Sig Dispense Refill   • levothyroxine (SYNTHROID, LEVOTHROID) 125 MCG tablet TAKE 1 TABLET BY MOUTH DAILY 30 tablet 5   • pantoprazole (PROTONIX) 40 MG EC tablet Take 1 tablet by mouth Daily. 30 tablet 5   • sucralfate (CARAFATE) 1 g tablet Take 1 tablet by mouth 4 (Four) Times a Day. 120 tablet 0     No current facility-administered medications on file prior to visit.        Review of Systems   Constitutional: Positive for fatigue, unexpected weight gain and unexpected weight loss.   Eyes: Negative.    Endocrine: Positive for heat intolerance.   Skin: Positive for dry skin and rash.   Psychiatric/Behavioral: Positive for sleep disturbance.   All other systems reviewed and are negative.    /78 (BP Location: Left arm, Patient Position: Sitting, Cuff Size: Adult)   Pulse 116   Ht 175.3 cm (69\")   Wt 94.3 kg (208 lb)   SpO2 98%   BMI 30.72 kg/m²      Physical Exam  Vitals reviewed.   Constitutional:       Appearance: Normal appearance.   Eyes:      Extraocular Movements: Extraocular movements intact.   Neck:      Comments: Mild thyromegaly with overall firmness noted.  No palpable nodules.  Cardiovascular:      Rate and Rhythm: Normal rate and regular rhythm.      Pulses: Normal pulses.      Heart sounds: Normal heart sounds.   Pulmonary:      Effort: Pulmonary effort is normal.      Breath sounds: Normal breath sounds.   Skin:     General: Skin is warm and " dry.   Neurological:      Mental Status: He is alert and oriented to person, place, and time.   Psychiatric:         Mood and Affect: Mood normal.         Behavior: Behavior normal.         Thought Content: Thought content normal.         Judgment: Judgment normal.       Labs/Imaging  CMP  Lab Results   Component Value Date    GLUCOSE 86 08/12/2021    BUN 14 08/12/2021    CREATININE 1.02 08/12/2021    EGFRIFNONA 80 08/12/2021    BCR 13.7 08/12/2021    K 4.7 08/12/2021    CO2 21.2 (L) 08/12/2021    CALCIUM 9.6 08/12/2021    ALBUMIN 4.90 08/12/2021    AST 27 08/12/2021    ALT 31 08/12/2021        CBC w/DIFF   Lab Results   Component Value Date    WBC 7.52 08/12/2021    RBC 5.92 (H) 08/12/2021    HGB 15.9 08/12/2021    HCT 50.4 08/12/2021    MCV 85.1 08/12/2021    MCH 26.9 08/12/2021    MCHC 31.5 08/12/2021    RDW 15.1 08/12/2021    RDWSD 47.1 08/12/2021    MPV 8.5 08/12/2021     08/12/2021    NEUTRORELPCT 50.2 08/12/2021    LYMPHORELPCT 36.4 08/12/2021    MONORELPCT 8.5 08/12/2021    EOSRELPCT 2.9 08/12/2021    BASORELPCT 0.9 08/12/2021    AUTOIGPER 1.1 (H) 08/12/2021    NEUTROABS 3.77 08/12/2021    LYMPHSABS 2.74 08/12/2021    MONOSABS 0.64 08/12/2021    EOSABS 0.22 08/12/2021    BASOSABS 0.07 08/12/2021    AUTOIGNUM 0.08 (H) 08/12/2021    NRBC 0.0 08/12/2021       TSH  Lab Results   Component Value Date    TSH 2.890 08/12/2021    TSH 7.54 (H) 01/25/2018       T4  Lab Results   Component Value Date    FREET4 1.28 08/12/2021    FREET4 1.1 01/25/2018     No results found for: G9GLRRH    T3  Lab Results   Component Value Date    T3FREE 4.14 08/12/2021     No results found for: A7HGEVZ    TRAb  No results found for: TSURCPAB    TPO  No results found for: THYROIDAB    No valid procedures specified.    Assessment and Plan    Diagnoses and all orders for this visit:    1. Hypothyroidism due to Hashimoto's thyroiditis (Primary)  Assessment & Plan:  - Clinically euthyroid.  -TFTs today with medication adjustment as  indicated.  -Will obtain ultrasound thyroid.  -Follow up in 4 months.      Orders:  -     US Thyroid  -     TSH       Return in 4 months (on 10/2/2022) for Follow-up appointment. The patient was instructed to contact the clinic with any interval questions or concerns.      This document has been electronically signed by EMMETT Holley  June 2, 2022 11:47 EDT   Endocrinology    Please note that portions of this document were completed with a voice recognition program. Efforts were made to edit the dictations, but occasionally words are mis-transcribed.

## 2022-06-02 NOTE — ASSESSMENT & PLAN NOTE
- Clinically euthyroid.  -TFTs today with medication adjustment as indicated.  -Will obtain ultrasound thyroid.  -Follow up in 4 months.

## 2022-06-03 ENCOUNTER — OFFICE VISIT (OUTPATIENT)
Dept: FAMILY MEDICINE CLINIC | Facility: CLINIC | Age: 42
End: 2022-06-03

## 2022-06-03 VITALS
OXYGEN SATURATION: 98 % | WEIGHT: 200 LBS | TEMPERATURE: 98.2 F | BODY MASS INDEX: 29.62 KG/M2 | SYSTOLIC BLOOD PRESSURE: 130 MMHG | HEIGHT: 69 IN | DIASTOLIC BLOOD PRESSURE: 84 MMHG | HEART RATE: 116 BPM

## 2022-06-03 DIAGNOSIS — R21 RASH: Primary | ICD-10-CM

## 2022-06-03 LAB — TSH SERPL DL<=0.05 MIU/L-ACNC: 1.41 UIU/ML (ref 0.27–4.2)

## 2022-06-03 PROCEDURE — 99213 OFFICE O/P EST LOW 20 MIN: CPT | Performed by: NURSE PRACTITIONER

## 2022-06-03 PROCEDURE — 96372 THER/PROPH/DIAG INJ SC/IM: CPT | Performed by: NURSE PRACTITIONER

## 2022-06-03 RX ORDER — FAMOTIDINE 20 MG/1
20 TABLET, FILM COATED ORAL 2 TIMES DAILY
Qty: 60 TABLET | Refills: 0 | Status: SHIPPED | OUTPATIENT
Start: 2022-06-03 | End: 2022-07-01 | Stop reason: SDUPTHER

## 2022-06-03 RX ORDER — PREDNISONE 10 MG/1
10 TABLET ORAL 2 TIMES DAILY
Qty: 10 TABLET | Refills: 0 | Status: SHIPPED | OUTPATIENT
Start: 2022-06-03 | End: 2022-09-07

## 2022-06-03 RX ORDER — LEVOCETIRIZINE DIHYDROCHLORIDE 5 MG/1
5 TABLET, FILM COATED ORAL EVERY EVENING
Qty: 30 TABLET | Refills: 0 | Status: SHIPPED | OUTPATIENT
Start: 2022-06-03 | End: 2023-02-27

## 2022-06-03 RX ORDER — DEXAMETHASONE SODIUM PHOSPHATE 4 MG/ML
8 INJECTION, SOLUTION INTRA-ARTICULAR; INTRALESIONAL; INTRAMUSCULAR; INTRAVENOUS; SOFT TISSUE ONCE
Status: COMPLETED | OUTPATIENT
Start: 2022-06-03 | End: 2022-06-03

## 2022-06-03 RX ORDER — CETIRIZINE HYDROCHLORIDE 1 MG/ML
5 SOLUTION ORAL 2 TIMES DAILY
Qty: 240 ML | Refills: 2 | Status: SHIPPED | OUTPATIENT
Start: 2022-06-03 | End: 2023-02-27

## 2022-06-03 RX ADMIN — DEXAMETHASONE SODIUM PHOSPHATE 8 MG: 4 INJECTION, SOLUTION INTRA-ARTICULAR; INTRALESIONAL; INTRAMUSCULAR; INTRAVENOUS; SOFT TISSUE at 17:33

## 2022-06-03 NOTE — PROGRESS NOTES
Nick Christian is a 42 y.o. male who presents to the office today complaining of a rash.  He reports 2 days ago while at work he developed a rash. 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      Rash  This is a new problem. The current episode started in the past 7 days. The problem has been waxing and waning since onset. The rash is diffuse. The rash is characterized by redness, itchiness and burning. It is unknown if there was an exposure to a precipitant. Associated symptoms include nail changes. Pertinent negatives include no anorexia, congestion, cough, eye pain, facial edema, fatigue, fever, joint pain, rhinorrhea, shortness of breath, sore throat or vomiting. Past treatments include antihistamine. The treatment provided mild relief.      The following portions of the patient's history were reviewed and updated as appropriate: allergies, current medications, past family history, past medical history, past social history, past surgical history and problem list.    Current Outpatient Medications:   •  levothyroxine (SYNTHROID, LEVOTHROID) 125 MCG tablet, TAKE 1 TABLET BY MOUTH DAILY, Disp: 30 tablet, Rfl: 5  •  pantoprazole (PROTONIX) 40 MG EC tablet, Take 1 tablet by mouth Daily., Disp: 30 tablet, Rfl: 5  •  sucralfate (CARAFATE) 1 g tablet, Take 1 tablet by mouth 4 (Four) Times a Day., Disp: 120 tablet, Rfl: 0  •  Cetirizine HCl (zyrTEC) 1 MG/ML syrup, Take 5 mL by mouth 2 (Two) Times a Day., Disp: 240 mL, Rfl: 2  •  famotidine (Pepcid) 20 MG tablet, Take 1 tablet by mouth 2 (Two) Times a Day., Disp: 60 tablet, Rfl: 0  •  levocetirizine (XYZAL) 5 MG tablet, Take 1 tablet by mouth Every Evening., Disp: 30 tablet, Rfl: 0  •  predniSONE (DELTASONE) 10 MG tablet, Take 1 tablet by mouth 2 (Two) Times a Day., Disp: 10 tablet, Rfl: 0    Allergies   Allergen Reactions   • Morphine Mental Status Change  "    Review of Systems   Constitutional: Positive for activity change. Negative for appetite change, chills, fatigue, fever and unexpected weight change.   HENT: Negative.  Negative for congestion, facial swelling, postnasal drip, rhinorrhea, sinus pressure, sinus pain, sneezing, sore throat, tinnitus and trouble swallowing.    Eyes: Negative for pain, discharge, redness, itching and visual disturbance.   Respiratory: Negative for cough, shortness of breath and wheezing.    Cardiovascular: Negative for chest pain, palpitations and leg swelling.   Gastrointestinal: Negative for anorexia, nausea and vomiting.        GERD   Endocrine: Negative for cold intolerance, heat intolerance, polydipsia, polyphagia and polyuria.   Musculoskeletal: Negative for joint pain.   Skin: Positive for nail changes and rash. Negative for color change.   Allergic/Immunologic: Positive for environmental allergies.   Neurological: Negative for dizziness, speech difficulty, weakness, light-headedness and headaches.   Hematological: Negative for adenopathy.   Psychiatric/Behavioral: Negative for confusion and decreased concentration. The patient is not nervous/anxious.    All other systems reviewed and are negative.    Visit Vitals  /84   Pulse 116   Temp 98.2 °F (36.8 °C)   Ht 175.3 cm (69.02\")   Wt 90.7 kg (200 lb)   SpO2 98%   BMI 29.52 kg/m²     Physical Exam  Vitals and nursing note reviewed.   Constitutional:       General: He is not in acute distress.     Appearance: He is well-developed.      Comments: Pleasant; in no acute distress   HENT:      Head: Normocephalic.      Nose: Nose normal.   Eyes:      General:         Right eye: No discharge.         Left eye: No discharge.      Conjunctiva/sclera: Conjunctivae normal.   Neck:      Thyroid: No thyromegaly.      Vascular: No JVD.   Cardiovascular:      Rate and Rhythm: Normal rate and regular rhythm.      Heart sounds: Normal heart sounds. No murmur heard.    No friction rub. "   Pulmonary:      Effort: Pulmonary effort is normal. No respiratory distress.      Breath sounds: Normal breath sounds. No wheezing or rales.   Abdominal:      General: Bowel sounds are normal. There is no distension.      Palpations: Abdomen is soft.      Tenderness: There is no abdominal tenderness.   Musculoskeletal:      Cervical back: Neck supple.      Right lower leg: No edema.      Left lower leg: No edema.   Lymphadenopathy:      Cervical: No cervical adenopathy.   Skin:     General: Skin is warm and dry.      Capillary Refill: Capillary refill takes less than 2 seconds.      Findings: Rash present. No erythema.      Comments: Erythematous rash which has a blotchy hive type appearance.    Neurological:      Mental Status: He is alert and oriented to person, place, and time.      Cranial Nerves: Cranial nerves are intact.      Gait: Gait is intact.   Psychiatric:         Mood and Affect: Mood and affect normal.         Speech: Speech normal.         Behavior: Behavior is cooperative.         Thought Content: Thought content normal.         Cognition and Memory: Cognition and memory normal.       Assessment & Plan   Diagnoses and all orders for this visit:    1. Rash (Primary)  -     dexamethasone (DECADRON) injection 8 mg  -     Cetirizine HCl (zyrTEC) 1 MG/ML syrup; Take 5 mL by mouth 2 (Two) Times a Day.  Dispense: 240 mL; Refill: 2  -     levocetirizine (XYZAL) 5 MG tablet; Take 1 tablet by mouth Every Evening.  Dispense: 30 tablet; Refill: 0  -     famotidine (Pepcid) 20 MG tablet; Take 1 tablet by mouth 2 (Two) Times a Day.  Dispense: 60 tablet; Refill: 0  -     predniSONE (DELTASONE) 10 MG tablet; Take 1 tablet by mouth 2 (Two) Times a Day.  Dispense: 10 tablet; Refill: 0    Findings and recommendations discussed with Nick. Reviewed treatment options.  Counseled regarding supportive care measures.  Signs and symptoms of concern reviewed occur to seek further medical evaluation or if symptoms worsen or  do not improve.  He will follow up with me July 1 to discuss long-term measures sooner if problems concerns occur.         This document has been electronically signed by EMMETT Tracy, MANAN-BC, FLORA

## 2022-07-01 DIAGNOSIS — R21 RASH: ICD-10-CM

## 2022-07-01 RX ORDER — FAMOTIDINE 20 MG/1
20 TABLET, FILM COATED ORAL 2 TIMES DAILY
Qty: 60 TABLET | Refills: 0 | Status: SHIPPED | OUTPATIENT
Start: 2022-07-01 | End: 2022-09-06

## 2022-07-21 ENCOUNTER — OFFICE VISIT (OUTPATIENT)
Dept: ORTHOPEDIC SURGERY | Facility: CLINIC | Age: 42
End: 2022-07-21

## 2022-07-21 ENCOUNTER — HOSPITAL ENCOUNTER (OUTPATIENT)
Dept: GENERAL RADIOLOGY | Facility: HOSPITAL | Age: 42
Discharge: HOME | End: 2022-07-21
Admitting: PHYSICIAN ASSISTANT

## 2022-07-21 VITALS
HEIGHT: 69 IN | DIASTOLIC BLOOD PRESSURE: 86 MMHG | SYSTOLIC BLOOD PRESSURE: 131 MMHG | HEART RATE: 85 BPM | BODY MASS INDEX: 29.62 KG/M2 | WEIGHT: 200 LBS

## 2022-07-21 DIAGNOSIS — M79.10 MUSCLE PAIN: ICD-10-CM

## 2022-07-21 DIAGNOSIS — M62.838 MUSCLE SPASM: ICD-10-CM

## 2022-07-21 DIAGNOSIS — G89.29 CHRONIC RIGHT SHOULDER PAIN: Primary | ICD-10-CM

## 2022-07-21 DIAGNOSIS — M25.611 SHOULDER STIFFNESS, RIGHT: ICD-10-CM

## 2022-07-21 DIAGNOSIS — G89.29 CHRONIC RIGHT SHOULDER PAIN: ICD-10-CM

## 2022-07-21 DIAGNOSIS — Q07.00 ARNOLD-CHIARI MALFORMATION: ICD-10-CM

## 2022-07-21 DIAGNOSIS — M54.2 NECK PAIN: ICD-10-CM

## 2022-07-21 DIAGNOSIS — M19.011 OSTEOARTHRITIS OF RIGHT AC (ACROMIOCLAVICULAR) JOINT: ICD-10-CM

## 2022-07-21 DIAGNOSIS — M25.511 CHRONIC RIGHT SHOULDER PAIN: ICD-10-CM

## 2022-07-21 DIAGNOSIS — M25.511 CHRONIC RIGHT SHOULDER PAIN: Primary | ICD-10-CM

## 2022-07-21 DIAGNOSIS — M75.81 ROTATOR CUFF TENDONITIS, RIGHT: ICD-10-CM

## 2022-07-21 PROCEDURE — 99204 OFFICE O/P NEW MOD 45 MIN: CPT | Performed by: FAMILY MEDICINE

## 2022-07-21 PROCEDURE — 73030 X-RAY EXAM OF SHOULDER: CPT

## 2022-07-21 NOTE — PROGRESS NOTES
"New Patient Visit      Patient: Nick Christian  YOB: 1980  Date of Encounter: 07/21/2022  PCP: Tracee Santos PA  Referring Provider: JÚNIOR Nicholas     Subjective   Nick Christian is a 42 y.o. male who presents to the office today for evaluation of Initial Evaluation and Pain of the Right Shoulder      Chief Complaint   Patient presents with   • Right Shoulder - Initial Evaluation, Pain       HPI     The patient states that he had problems with his right shoulder back in 2009 or 2010 when he was living in [place]. He states that he had imaging completed on his right shoulder and was told that something was pinched in his rotator cuff. The patient states that the nerve was never affected, it was mainly his rotator cuff. He received a couple of injections to reduce the inflammation, but they were not beneficial. The patient reports that he started working out again and continued to experience intermittent pain levels. He explains that in the past few years he has reduced his upper extremity work outs. The patient occasionally will hear a \"pop and grind\" in the shoulder. He states that in 2021 the shoulder discomfort returned, feeling as if he had a knot in the shoulder when lying on his right side. The patient states that he would place his upper extremity against the wall to open the rotator cuff. He states that today he can feel the pain down into upper extremity. The patient reports the feeling is difficult to explain. He notes pain on the inside. The patient states that last week he picked a piece of pipe on his upper extremity, and it felt like someone took a wrench to his upper extremity. He notes the feeling of the \"knot\" sensation in the shoulder. The patient denies any injury. 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 The patient is unsure if he had an MRI scan because it was years ago. He tried to see another physician in [place] and states they do not have imaging " there anymore.     The patient states the pain is in the top of the shoulder. He reports that he tries to exercise his upper extremity and takes aspirin, and up to 4 Aleve a day for other reasons. He alternates the aspirin and the Aleve, never taking at the same time. The patient states that he takes Tylenol for his hip. He notes that he has had 2 hip replacements. He states that he had to have one of his hip replacements redone due to the wrong joint socket was placed in the hip. The patient reports that his other hip is locking up on him at times. The patient states that he is unable to take NSAIDs much because he has acid reflux. The patient states that he has had injections in his shoulder in the past, the last one was in 2015, but it did was not beneficial.     The patient states that he was born with Arnold -Chiari malformation, and also experiences acid reflux. He has been in between stage 1 and stage 2 for years. He states that occasionally he has spinal problems. 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 He notes that his brothers also have back issues. The patient states that he tries to do his exercises. He reports his pain will be spontaneous. The patient is interested in trying the medication first. He would like to stay away from attending physical therapy, he has exercises at home to try.      He states that he is taking the aspirin for his pain. The patient took aspirin in the past for his cholesterol. He reports having heart failure in 2009. He notes he is a twin. He explains that he has two rods going to his knee. The patient states he has titanium. He states at night is when the pain is increased. He reports that he has taken a muscle relaxer, but it affects his acid reflux. The patient denies using a TENS unit.    The patient has a sleep apnea machine.       Patient Active Problem List   Diagnosis   • Healthcare maintenance   • Arnold-Chiari malformation (HCC)    • Hypothyroidism due to Hashimoto's thyroiditis   • Juvenile osteochondrosis of head of femur   • Legg-Perthes disease, right   • Periodic headache syndrome, not intractable   • Mixed hyperlipidemia   • Prediabetes   • JEMMA (obstructive sleep apnea)   • Obesity (BMI 30-39.9)   • Sleepiness   • Hyperglycemia   • Exposure to COVID-19 virus   • Hematospermia   • Acute prostatitis   • Epididymitis   • Preoperative clearance   • History of CHF (congestive heart failure)       Past Medical History:   Diagnosis Date   • Arnold-Chiari deformity (HCC)    • Arrhythmia    • Arthritis    • Coronary artery disease    • Perthes disease    • Sleep apnea    • Stroke (HCC)    • Thyroid disease        Past Surgical History:   Procedure Laterality Date   • GALLBLADDER SURGERY     • HERNIA REPAIR     • HIP BIPOLAR REPLACEMENT         Family History   Problem Relation Age of Onset   • Arthritis Mother    • Rheumatologic disease Mother    • Diabetes Mother    • Hypertension Mother    • Obesity Mother    • Asthma Mother    • Diabetes Father    • Cancer Father         PANCREATIC   • Sleep apnea Father    • Hypersomnolence Father    • Asthma Father    • Arthritis Father    • Cancer Paternal Aunt    • Hypertension Maternal Grandmother    • Heart disease Maternal Grandfather    • Diabetes Maternal Grandfather    • Thyroid disease Paternal Grandmother    • Heart disease Paternal Grandfather    • Stroke Paternal Grandfather        Social History     Socioeconomic History   • Marital status:    Tobacco Use   • Smoking status: Former Smoker     Packs/day: 1.00     Types: Cigarettes     Quit date:      Years since quittin.5   • Smokeless tobacco: Former User     Types: Chew   Vaping Use   • Vaping Use: Never used   Substance and Sexual Activity   • Alcohol use: Yes     Comment: occas   • Drug use: No   • Sexual activity: Defer       Current Outpatient Medications   Medication Sig Dispense Refill   • Cetirizine HCl (zyrTEC) 1  "MG/ML syrup Take 5 mL by mouth 2 (Two) Times a Day. 240 mL 2   • famotidine (Pepcid) 20 MG tablet Take 1 tablet by mouth 2 (Two) Times a Day. 60 tablet 0   • levocetirizine (XYZAL) 5 MG tablet Take 1 tablet by mouth Every Evening. 30 tablet 0   • levothyroxine (SYNTHROID, LEVOTHROID) 125 MCG tablet TAKE 1 TABLET BY MOUTH DAILY 30 tablet 5   • pantoprazole (PROTONIX) 40 MG EC tablet Take 1 tablet by mouth Daily. 30 tablet 5   • sucralfate (CARAFATE) 1 g tablet Take 1 tablet by mouth 4 (Four) Times a Day. 120 tablet 0   • diclofenac (VOLTAREN) 50 MG EC tablet Take 1 tablet by mouth 2 (Two) Times a Day As Needed (pain). 60 tablet 1   • predniSONE (DELTASONE) 10 MG tablet Take 1 tablet by mouth 2 (Two) Times a Day. 10 tablet 0     No current facility-administered medications for this visit.       Allergies   Allergen Reactions   • Morphine Mental Status Change       Review of Systems   Constitutional: Positive for activity change. Negative for fever.   Respiratory: Negative for shortness of breath and wheezing.    Cardiovascular: Negative for chest pain.   Musculoskeletal: Positive for arthralgias, myalgias, neck pain and neck stiffness.   Skin: Negative for color change and wound.   Neurological: Negative for weakness and numbness.       Visit Vitals  /86   Pulse 85   Ht 175.3 cm (69\")   Wt 90.7 kg (200 lb)   BMI 29.53 kg/m²     42 y.o.male  Physical Exam  Vitals and nursing note reviewed.   Constitutional:       General: He is not in acute distress.     Appearance: Normal appearance.   Pulmonary:      Effort: Pulmonary effort is normal. No respiratory distress.   Skin:     General: Skin is warm and dry.      Findings: No erythema.   Neurological:      General: No focal deficit present.      Mental Status: He is alert.      Sensory: No sensory deficit.      Motor: No weakness.          Neck exam:  Nontender cervical spine.  Tender paraspinals bilaterally.  Tender in the right trapezius.  Right Spurling radiates " to the top of the shoulder.  Left Spurling still goes to the top of the right shoulder.    Right shoulder exam:  Tender AC, anterior shoulder joint, coracoid, coracoid, biceps.  Full range of motion with end range pain in all directions in the right shoulder.  Pain on biceps testing.  Pain on resisted testing of all musculature, worse on biceps, infraspinatus, and supraspinatus without weakness.  Pain on [place] tests.  Positive Speed's test.  Positive AC crossover and shear test.  Pain on impingement sign.    Bilateral upper extremities:  Neurovascularly intact.  Intact  strength.  Deep tendon reflexes: Intact.      Radiology Results:    XR Shoulder 2+ View Right    Result Date: 7/21/2022    No acute findings in the right shoulder.  This report was finalized on 7/21/2022 1:22 PM by Dr. Bogdan Gusman MD.      MRI RIGHT SHOULDER-     INDICATION-  Pain and decreased range of motion.     FINDINGS-  MRI of the right shoulder was performed without   contrast. There is mild acromioclavicular osteoarthritis with   capsular hypertrophy. There is cystic change at the junction of   the greater tuberosity and humeral head posteriorly. The rotator   cuff tendons are normal in thickness and in signal. The long head   of the biceps tendon is intact. No gross labral abnormality is   demonstrated. The humeral head has normal marrow signal. There is   no joint effusion or fluid in the subacromial/subdeltoid bursa.     IMPRESSION-       1. Changes in the proximal humerus suggest chronic shoulder   impingement. However, there is no evidence of rotator cuff tear.     2. Acromioclavicular osteoarthritis.     If the above results differ significantly from clinical impression   further evaluation may be warranted.         Dictated on- 10/07/2011 15-15-59      Assessment & Plan          Diagnoses and all orders for this visit:    1. Chronic right shoulder pain (Primary)  -     XR Shoulder 2+ View Right; Future  -     diclofenac  (VOLTAREN) 50 MG EC tablet; Take 1 tablet by mouth 2 (Two) Times a Day As Needed (pain).  Dispense: 60 tablet; Refill: 1    2. Rotator cuff tendonitis, right  -     diclofenac (VOLTAREN) 50 MG EC tablet; Take 1 tablet by mouth 2 (Two) Times a Day As Needed (pain).  Dispense: 60 tablet; Refill: 1    3. Shoulder stiffness, right  -     diclofenac (VOLTAREN) 50 MG EC tablet; Take 1 tablet by mouth 2 (Two) Times a Day As Needed (pain).  Dispense: 60 tablet; Refill: 1    4. Osteoarthritis of right AC (acromioclavicular) joint  -     diclofenac (VOLTAREN) 50 MG EC tablet; Take 1 tablet by mouth 2 (Two) Times a Day As Needed (pain).  Dispense: 60 tablet; Refill: 1    5. Neck pain  -     diclofenac (VOLTAREN) 50 MG EC tablet; Take 1 tablet by mouth 2 (Two) Times a Day As Needed (pain).  Dispense: 60 tablet; Refill: 1    6. Muscle spasm  -     diclofenac (VOLTAREN) 50 MG EC tablet; Take 1 tablet by mouth 2 (Two) Times a Day As Needed (pain).  Dispense: 60 tablet; Refill: 1    7. Muscle pain  -     diclofenac (VOLTAREN) 50 MG EC tablet; Take 1 tablet by mouth 2 (Two) Times a Day As Needed (pain).  Dispense: 60 tablet; Refill: 1    8. Arnold-Chiari malformation (HCC)         MEDS ORDERED DURING VISIT:  New Medications Ordered This Visit   Medications   • diclofenac (VOLTAREN) 50 MG EC tablet     Sig: Take 1 tablet by mouth 2 (Two) Times a Day As Needed (pain).     Dispense:  60 tablet     Refill:  1     MEDICATION ISSUES:  Discussed medication options and treatment plan of prescribed medication as well as the risks, benefits, and side effects including potential falls, possible impaired driving and metabolic adversities among others. Patient is agreeable to call the office with any worsening of symptoms or onset of side effects. Patient is agreeable to call 911 or go to the nearest ER should he/she begin having SI/HI.     Discussion:  Reviewed old MRI. Consider PT, injections, advanced imaging if not improving.   Patient  having right shoulder pain issues which are likely multifactorial involving combination of AC arthritis, rotator cuff impingement and tendinitis, biceps tendinitis and possibly some cervical radicular component as well. I have given the patient exercises for the shoulder and neck and he will follow up in 2 to 4 weeks for reevaluation. Plan to do cervical spine x-rays at that time if not improving. Changed his NSAID to diclofenac since he has some GERD issues and his Aleve is not particularly helpful anyway. Also sent in an order for a tens unit for the patient to help with his muscle spasm issues in the trapezius and paraspinals. Follow up in 2 to 4 weeks or sooner if needed.             This document has been electronically signed by Martina Nevarez   July 21, 2022 18:27 EDT    Part of this note may be an electronic transcription/translation of spoken language to printed text using the Dragon Dictation System.      Transcribed from ambient dictation for Ozzie Maldonado DO by Martina Nvearez.  07/21/22   18:27 EDT    Patient verbalized consent to the visit recording.  I have personally performed the services described in this document as transcribed by the above individual, and it is both accurate and complete.  Martina Nevarez  7/21/2022  18:27 EDT

## 2022-07-28 DIAGNOSIS — E06.3 HYPOTHYROIDISM DUE TO HASHIMOTO'S THYROIDITIS: ICD-10-CM

## 2022-07-28 DIAGNOSIS — N45.1 EPIDIDYMITIS: ICD-10-CM

## 2022-07-28 DIAGNOSIS — E03.8 HYPOTHYROIDISM DUE TO HASHIMOTO'S THYROIDITIS: ICD-10-CM

## 2022-07-28 RX ORDER — PROMETHAZINE HYDROCHLORIDE 25 MG/1
TABLET ORAL
Qty: 21 TABLET | Refills: 2 | OUTPATIENT
Start: 2022-07-28

## 2022-07-28 RX ORDER — LEVOTHYROXINE SODIUM 0.12 MG/1
125 TABLET ORAL DAILY
Qty: 30 TABLET | Refills: 5 | Status: SHIPPED | OUTPATIENT
Start: 2022-07-28 | End: 2023-02-27 | Stop reason: SDUPTHER

## 2022-09-03 DIAGNOSIS — R21 RASH: ICD-10-CM

## 2022-09-06 RX ORDER — FAMOTIDINE 20 MG/1
TABLET, FILM COATED ORAL
Qty: 60 TABLET | Refills: 0 | Status: SHIPPED | OUTPATIENT
Start: 2022-09-06 | End: 2022-10-05

## 2022-09-07 ENCOUNTER — OFFICE VISIT (OUTPATIENT)
Dept: FAMILY MEDICINE CLINIC | Facility: CLINIC | Age: 42
End: 2022-09-07

## 2022-09-07 VITALS
HEIGHT: 69 IN | OXYGEN SATURATION: 98 % | DIASTOLIC BLOOD PRESSURE: 80 MMHG | SYSTOLIC BLOOD PRESSURE: 120 MMHG | TEMPERATURE: 98.6 F | WEIGHT: 212 LBS | BODY MASS INDEX: 31.4 KG/M2 | HEART RATE: 65 BPM

## 2022-09-07 DIAGNOSIS — K21.00 GASTROESOPHAGEAL REFLUX DISEASE WITH ESOPHAGITIS WITHOUT HEMORRHAGE: ICD-10-CM

## 2022-09-07 DIAGNOSIS — K62.5 BRIGHT RED BLOOD PER RECTUM: ICD-10-CM

## 2022-09-07 DIAGNOSIS — R11.0 NAUSEA: ICD-10-CM

## 2022-09-07 DIAGNOSIS — N32.89 BLADDER WALL THICKENING: ICD-10-CM

## 2022-09-07 DIAGNOSIS — R10.30 LOWER ABDOMINAL PAIN: ICD-10-CM

## 2022-09-07 DIAGNOSIS — R50.81 FEVER IN OTHER DISEASES: Primary | ICD-10-CM

## 2022-09-07 PROBLEM — Z01.818 PREOPERATIVE CLEARANCE: Status: RESOLVED | Noted: 2021-03-10 | Resolved: 2022-09-07

## 2022-09-07 LAB
ALBUMIN SERPL-MCNC: 5.12 G/DL (ref 3.5–5.2)
ALBUMIN/GLOB SERPL: 1.7 G/DL
ALP SERPL-CCNC: 59 U/L (ref 39–117)
ALT SERPL W P-5'-P-CCNC: 39 U/L (ref 1–41)
ANION GAP SERPL CALCULATED.3IONS-SCNC: 14.2 MMOL/L (ref 5–15)
AST SERPL-CCNC: 26 U/L (ref 1–40)
BASOPHILS # BLD AUTO: 0.1 10*3/MM3 (ref 0–0.2)
BASOPHILS NFR BLD AUTO: 1.2 % (ref 0–1.5)
BILIRUB SERPL-MCNC: 0.6 MG/DL (ref 0–1.2)
BUN SERPL-MCNC: 10 MG/DL (ref 6–20)
BUN/CREAT SERPL: 9.4 (ref 7–25)
CALCIUM SPEC-SCNC: 9.9 MG/DL (ref 8.6–10.5)
CHLORIDE SERPL-SCNC: 100 MMOL/L (ref 98–107)
CO2 SERPL-SCNC: 26.8 MMOL/L (ref 22–29)
CREAT SERPL-MCNC: 1.06 MG/DL (ref 0.76–1.27)
CRP SERPL-MCNC: <0.3 MG/DL (ref 0–0.5)
DEPRECATED RDW RBC AUTO: 41.2 FL (ref 37–54)
EGFRCR SERPLBLD CKD-EPI 2021: 89.3 ML/MIN/1.73
EOSINOPHIL # BLD AUTO: 0.15 10*3/MM3 (ref 0–0.4)
EOSINOPHIL NFR BLD AUTO: 1.8 % (ref 0.3–6.2)
ERYTHROCYTE [DISTWIDTH] IN BLOOD BY AUTOMATED COUNT: 13.2 % (ref 12.3–15.4)
GLOBULIN UR ELPH-MCNC: 3.1 GM/DL
GLUCOSE SERPL-MCNC: 89 MG/DL (ref 65–99)
HCT VFR BLD AUTO: 46.9 % (ref 37.5–51)
HGB BLD-MCNC: 15.6 G/DL (ref 13–17.7)
IMM GRANULOCYTES # BLD AUTO: 0.06 10*3/MM3 (ref 0–0.05)
IMM GRANULOCYTES NFR BLD AUTO: 0.7 % (ref 0–0.5)
LIPASE SERPL-CCNC: 35 U/L (ref 13–60)
LYMPHOCYTES # BLD AUTO: 3.02 10*3/MM3 (ref 0.7–3.1)
LYMPHOCYTES NFR BLD AUTO: 36.6 % (ref 19.6–45.3)
MCH RBC QN AUTO: 28.8 PG (ref 26.6–33)
MCHC RBC AUTO-ENTMCNC: 33.3 G/DL (ref 31.5–35.7)
MCV RBC AUTO: 86.5 FL (ref 79–97)
MONOCYTES # BLD AUTO: 0.64 10*3/MM3 (ref 0.1–0.9)
MONOCYTES NFR BLD AUTO: 7.8 % (ref 5–12)
NEUTROPHILS NFR BLD AUTO: 4.28 10*3/MM3 (ref 1.7–7)
NEUTROPHILS NFR BLD AUTO: 51.9 % (ref 42.7–76)
NRBC BLD AUTO-RTO: 0 /100 WBC (ref 0–0.2)
PLATELET # BLD AUTO: 361 10*3/MM3 (ref 140–450)
PMV BLD AUTO: 8.2 FL (ref 6–12)
POTASSIUM SERPL-SCNC: 4.1 MMOL/L (ref 3.5–5.2)
PROCALCITONIN SERPL-MCNC: 0.05 NG/ML (ref 0–0.25)
PROT SERPL-MCNC: 8.2 G/DL (ref 6–8.5)
RBC # BLD AUTO: 5.42 10*6/MM3 (ref 4.14–5.8)
SODIUM SERPL-SCNC: 141 MMOL/L (ref 136–145)
WBC NRBC COR # BLD: 8.25 10*3/MM3 (ref 3.4–10.8)

## 2022-09-07 PROCEDURE — 80053 COMPREHEN METABOLIC PANEL: CPT | Performed by: PHYSICIAN ASSISTANT

## 2022-09-07 PROCEDURE — 85025 COMPLETE CBC W/AUTO DIFF WBC: CPT | Performed by: PHYSICIAN ASSISTANT

## 2022-09-07 PROCEDURE — 86140 C-REACTIVE PROTEIN: CPT | Performed by: PHYSICIAN ASSISTANT

## 2022-09-07 PROCEDURE — 83690 ASSAY OF LIPASE: CPT | Performed by: PHYSICIAN ASSISTANT

## 2022-09-07 PROCEDURE — 99214 OFFICE O/P EST MOD 30 MIN: CPT | Performed by: PHYSICIAN ASSISTANT

## 2022-09-07 PROCEDURE — 84145 PROCALCITONIN (PCT): CPT | Performed by: PHYSICIAN ASSISTANT

## 2022-09-07 RX ORDER — ONDANSETRON 4 MG/1
4 TABLET, FILM COATED ORAL EVERY 8 HOURS PRN
Qty: 30 TABLET | Refills: 1 | Status: SHIPPED | OUTPATIENT
Start: 2022-09-07 | End: 2022-12-20

## 2022-09-07 NOTE — PATIENT INSTRUCTIONS
BMI for Adults  Body mass index (BMI) is a number that is calculated from a person's weight and height. In most adults, the number is used to find how much of an adult's weight is made up of fat. BMI is not as accurate as a direct measure of body fat.  HOW IS BMI CALCULATED?  BMI is calculated by dividing weight in kilograms by height in meters squared. It can also be calculated by dividing weight in pounds by height in inches squared, then multiplying the resulting number by 703. Charts are available to help you find your BMI quickly and easily without doing this calculation.   HOW IS BMI INTERPRETED?  Health care professionals use BMI charts to identify whether an adult is underweight, at a normal weight, or overweight based on the following guidelines:  Underweight: BMI less than 18.5.  Normal weight: BMI between 18.5 and 24.9.  Overweight: BMI between 25 and 29.9.  Obese: BMI of 30 and above.  BMI is usually interpreted the same for males and females.  Weight includes both fat and muscle, so someone with a muscular build, such as an athlete, may have a BMI that is higher than 24.9. In cases like these, BMI may not accurately depict body fat. To determine if excess body fat is the cause of a BMI of 25 or higher, further assessments may need to be done by a health care provider.  WHY IS BMI A USEFUL TOOL?  BMI is used to identify a possible weight problem that may be related to a medical problem or may increase the risk for medical problems. BMI can also be used to promote changes to reach a healthy weight.     This information is not intended to replace advice given to you by your health care provider. Make sure you discuss any questions you have with your health care provider.     Document Released: 08/29/2005 Document Revised: 01/08/2016 Document Reviewed: 05/15/2015  [employer] Patient Education ©2017 [employer]       Calorie Counting for Weight Loss  Calories are energy you get from the things  you eat and drink. Your body uses this energy to keep you going throughout the day. The number of calories you eat affects your weight. When you eat more calories than your body needs, your body stores the extra calories as fat. When you eat fewer calories than your body needs, your body burns fat to get the energy it needs.  Calorie counting means keeping track of how many calories you eat and drink each day. If you make sure to eat fewer calories than your body needs, you should lose weight. In order for calorie counting to work, you will need to eat the number of calories that are right for you in a day to lose a healthy amount of weight per week. A healthy amount of weight to lose per week is usually 1-2 lb (0.5-0.9 kg). A dietitian can determine how many calories you need in a day and give you suggestions on how to reach your calorie goal.   WHAT IS MY MY PLAN?  My goal is to have __________ calories per day.   If I have this many calories per day, I should lose around __________ pounds per week.  WHAT DO I NEED TO KNOW ABOUT CALORIE COUNTING?  In order to meet your daily calorie goal, you will need to:  Find out how many calories are in each food you would like to eat. Try to do this before you eat.  Decide how much of the food you can eat.  Write down what you ate and how many calories it had. Doing this is called keeping a food log.  WHERE DO I FIND CALORIE INFORMATION?  The number of calories in a food can be found on a Nutrition Facts label. Note that all the information on a label is based on a specific serving of the food. If a food does not have a Nutrition Facts label, try to look up the calories online or ask your dietitian for help.  HOW DO I DECIDE HOW MUCH TO EAT?  To decide how much of the food you can eat, you will need to consider both the number of calories in one serving and the size of one serving. This information can be found on the Nutrition Facts label. If a food does not have a Nutrition  Facts label, look up the information online or ask your dietitian for help.  Remember that calories are listed per serving. If you choose to have more than one serving of a food, you will have to multiply the calories per serving by the amount of servings you plan to eat. For example, the label on a package of bread might say that a serving size is 1 slice and that there are 90 calories in a serving. If you eat 1 slice, you will have eaten 90 calories. If you eat 2 slices, you will have eaten 180 calories.  HOW DO I KEEP A FOOD LOG?  After each meal, record the following information in your food log:  What you ate.  How much of it you ate.  How many calories it had.  Then, add up your calories.  Keep your food log near you, such as in a small notebook in your pocket. Another option is to use a mobile monty or website. Some programs will calculate calories for you and show you how many calories you have left each time you add an item to the log.  WHAT ARE SOME CALORIE COUNTING TIPS?  Use your calories on foods and drinks that will fill you up and not leave you hungry. Some examples of this include foods like nuts and nut butters, vegetables, lean proteins, and high-fiber foods (more than 5 g fiber per serving).  Eat nutritious foods and avoid empty calories. Empty calories are calories you get from foods or beverages that do not have many nutrients, such as candy and soda. It is better to have a nutritious high-calorie food (such as an avocado) than a food with few nutrients (such as a bag of chips).  Know how many calories are in the foods you eat most often. This way, you do not have to look up how many calories they have each time you eat them.  Look out for foods that may seem like low-calorie foods but are really high-calorie foods, such as baked goods, soda, and fat-free candy.  Pay attention to calories in drinks. Drinks such as sodas, specialty coffee drinks, alcohol, and juices have a lot of calories yet do  not fill you up. Choose low-calorie drinks like water and diet drinks.  Focus your calorie counting efforts on higher calorie items. Logging the calories in a garden salad that contains only vegetables is less important than calculating the calories in a milk shake.  Find a way of tracking calories that works for you. Get creative. Most people who are successful find ways to keep track of how much they eat in a day, even if they do not count every calorie.  WHAT ARE SOME PORTION CONTROL TIPS?  Know how many calories are in a serving. This will help you know how many servings of a certain food you can have.  Use a measuring cup to measure serving sizes. This is helpful when you start out. With time, you will be able to estimate serving sizes for some foods.  Take some time to put servings of different foods on your favorite plates, bowls, and cups so you know what a serving looks like.  Try not to eat straight from a bag or box. Doing this can lead to overeating. Put the amount you would like to eat in a cup or on a plate to make sure you are eating the right portion.  Use smaller plates, glasses, and bowls to prevent overeating. This is a quick and easy way to practice portion control. If your plate is smaller, less food can fit on it.  Try not to multitask while eating, such as watching TV or using your computer. If it is time to eat, sit down at a table and enjoy your food. Doing this will help you to start recognizing when you are full. It will also make you more aware of what and how much you are eating.  HOW CAN I CALORIE COUNT WHEN EATING OUT?  Ask for smaller portion sizes or child-sized portions.  Consider sharing an entree and sides instead of getting your own entree.  If you get your own entree, eat only half. Ask for a box at the beginning of your meal and put the rest of your entree in it so you are not tempted to eat it.  Look for the calories on the menu. If calories are listed, choose the lower  "calorie options.  Choose dishes that include vegetables, fruits, whole grains, low-fat dairy products, and lean protein. Focusing on smart food choices from each of the 5 food groups can help you stay on track at restaurants.  Choose items that are boiled, broiled, grilled, or steamed.  Choose water, milk, unsweetened iced tea, or other drinks without added sugars. If you want an alcoholic beverage, choose a lower calorie option. For example, a regular darlin can have up to 700 calories and a glass of wine has around 150.  Stay away from items that are buttered, battered, fried, or served with cream sauce. Items labeled \"crispy\" are usually fried, unless stated otherwise.  Ask for dressings, sauces, and syrups on the side. These are usually very high in calories, so do not eat much of them.  Watch out for salads. Many people think salads are a healthy option, but this is often not the case. Many salads come with jean, fried chicken, lots of cheese, fried chips, and dressing. All of these items have a lot of calories. If you want a salad, choose a garden salad and ask for grilled meats or steak. Ask for the dressing on the side, or ask for olive oil and vinegar or lemon to use as dressing.  Estimate how many servings of a food you are given. For example, a serving of cooked rice is ½ cup or about the size of half a tennis ball or one cupcake wrapper. Knowing serving sizes will help you be aware of how much food you are eating at restaurants. The list below tells you how big or small some common portion sizes are based on everyday objects.  1 oz--4 stacked dice.  3 oz--1 deck of cards.  1 tsp--1 dice.  1 Tbsp--½ a Ping-Pong ball.  2 Tbsp--1 Ping-Pong ball.  ½ cup--1 tennis ball or 1 cupcake wrapper.  1 cup--1 baseball.     This information is not intended to replace advice given to you by your health care provider. Make sure you discuss any questions you have with your health care provider.     Document Released: " 12/18/2006 Document Revised: 01/08/2016 Document Reviewed: 10/23/2014  [employer] Interactive Patient Education ©2017 [employer]

## 2022-09-07 NOTE — PROGRESS NOTES
Subjective        Chief Complaint  Nausea, Fever, and Rectal Bleeding    Subjective      History of Present Illness  Nick Christian is a 42 y.o. male who presents today to [facility] for multiple complaints. Past medical history is significant for CAD with prior stent in 2008, history of CVA, hypothyroidism, Arnold-Chiari deformity, Perthes disease, sleep apnea, dyslipidemia, hx of prostatitis.    Fever:   Nausea/Vomiting:   Fatigue:   Bright red blood per rectum:   Intermittent lose stool:   Mr. Christian presented to the office today with multiple complaints. He reports that most recently, over the last 3-4 days, he has had fever up to 103.4F. This has been associated with nausea without vomiting. No associated cough, sinus congestion. No known COVID + contacts. No recent antibiotic therapy. No recent travel. No eating undercooked meats or drinking unclean water. No rash. No headache or neck stiffness. He has had some mild loose stool, but no watery diarrhea. He also reports about 5 months of intermittent BRBPR. This is not necessarily associated with painful BMs or straining. He reports a remote history of colonoscopy and was told he had a spastic colon. Denies known history of IBD, hemorrhoidal bleeding, or diverticulitis. Denies regular alcohol intake or history of pancreatitis. No specific abdominal pain. He was previously referred to GI, however, canceled his initial appointment due to conflicts. He has another appointment scheduled for next month. This was for GERD with esophagitis.     GERD with esophagitis:   He reports a long history of significant acid reflux. Referred to GI previously as noted above with appt pending. On pantoprazole and carafate with reports that nothing really helps his symptoms. Unsure if he is also taking pepid. Has tried other PPIs in the past. He was previously ordered a H. Pylori test, however, did not have this completed. He is unsure if he has  "had a prior EGD. Prior cholecystectomy noted.     CAD:   Reports hx of stenting x1 in 2008. No recent chest pains.     Sleep Apnea:   Reports previously unable to tolerate CPAP and sent it back to the company.     Perthes disease:   S/p bilateral hip replacement.       Current Outpatient Medications:   •  Cetirizine HCl (zyrTEC) 1 MG/ML syrup, Take 5 mL by mouth 2 (Two) Times a Day., Disp: 240 mL, Rfl: 2  •  famotidine (PEPCID) 20 MG tablet, TAKE 1 TABLET BY MOUTH TWICE DAILY, Disp: 60 tablet, Rfl: 0  •  levocetirizine (XYZAL) 5 MG tablet, Take 1 tablet by mouth Every Evening., Disp: 30 tablet, Rfl: 0  •  levothyroxine (SYNTHROID, LEVOTHROID) 125 MCG tablet, TAKE 1 TABLET BY MOUTH DAILY, Disp: 30 tablet, Rfl: 5  •  ondansetron (Zofran) 4 MG tablet, Take 1 tablet by mouth Every 8 (Eight) Hours As Needed for Nausea or Vomiting., Disp: 30 tablet, Rfl: 1  •  pantoprazole (PROTONIX) 40 MG EC tablet, Take 1 tablet by mouth Daily., Disp: 30 tablet, Rfl: 5  •  sucralfate (CARAFATE) 1 g tablet, Take 1 tablet by mouth 4 (Four) Times a Day., Disp: 120 tablet, Rfl: 0      Allergies   Allergen Reactions   • Morphine Mental Status Change     Objective     Objective   Vital Signs:  Blood Pressure 120/80   Pulse 65   Temperature 98.6 °F (37 °C) (Temporal)   Height 175.3 cm (69\")   Weight 96.2 kg (212 lb)   Oxygen Saturation 98%   Body Mass Index 31.31 kg/m²   Estimated body mass index is 31.31 kg/m² as calculated from the following:    Height as of this encounter: 175.3 cm (69\").    Weight as of this encounter: 96.2 kg (212 lb).        Past Medical History:   Diagnosis Date   • Arnold-Chiari deformity (HCC)    • Arrhythmia    • Arthritis    • Coronary artery disease    • Perthes disease    • Sleep apnea    • Stroke (HCC)    • Thyroid disease      Past Surgical History:   Procedure Laterality Date   • GALLBLADDER SURGERY     • HERNIA REPAIR     • HIP BIPOLAR REPLACEMENT  2019     Social History     Socioeconomic History   • " Marital status:    Tobacco Use   • Smoking status: Former Smoker     Packs/day: 1.00     Types: Cigarettes     Quit date:      Years since quittin.6   • Smokeless tobacco: Former User     Types: Chew   Vaping Use   • Vaping Use: Never used   Substance and Sexual Activity   • Alcohol use: Yes     Comment: occas   • Drug use: No   • Sexual activity: Defer      Physical Exam  Vitals and nursing note reviewed.   Constitutional:       General: He is not in acute distress.     Appearance: He is well-developed. He is not diaphoretic.   HENT:      Head: Normocephalic and atraumatic.   Eyes:      General: No scleral icterus.        Right eye: No discharge.         Left eye: No discharge.      Conjunctiva/sclera: Conjunctivae normal.   Cardiovascular:      Rate and Rhythm: Normal rate and regular rhythm.      Heart sounds: Normal heart sounds. No murmur heard.    No friction rub. No gallop.   Pulmonary:      Effort: Pulmonary effort is normal. No respiratory distress.      Breath sounds: Normal breath sounds. No wheezing or rales.   Chest:      Chest wall: No tenderness.   Abdominal:      General: Bowel sounds are normal. There is no distension.      Palpations: Abdomen is soft. There is no mass.      Tenderness: There is no abdominal tenderness. There is no right CVA tenderness, left CVA tenderness, guarding or rebound.      Hernia: No hernia is present.   Musculoskeletal:         General: Normal range of motion.      Cervical back: Normal range of motion and neck supple.      Right lower leg: No edema.      Left lower leg: No edema.   Skin:     General: Skin is warm and dry.      Coloration: Skin is not pale.      Findings: No erythema or rash.   Neurological:      Mental Status: He is alert and oriented to person, place, and time.   Psychiatric:         Behavior: Behavior normal.       Result Review :  The following data was reviewed by: JÚNIOR Velázquez on 2022:  Office Visit on 2022    Component Date Value Ref Range Status   • Glucose 09/07/2022 89  65 - 99 mg/dL Final   • BUN 09/07/2022 10  6 - 20 mg/dL Final   • Creatinine 09/07/2022 1.06  0.76 - 1.27 mg/dL Final   • Sodium 09/07/2022 141  136 - 145 mmol/L Final   • Potassium 09/07/2022 4.1  3.5 - 5.2 mmol/L Final   • Chloride 09/07/2022 100  98 - 107 mmol/L Final   • CO2 09/07/2022 26.8  22.0 - 29.0 mmol/L Final   • Calcium 09/07/2022 9.9  8.6 - 10.5 mg/dL Final   • Total Protein 09/07/2022 8.2  6.0 - 8.5 g/dL Final   • Albumin 09/07/2022 5.12  3.50 - 5.20 g/dL Final   • ALT (SGPT) 09/07/2022 39  1 - 41 U/L Final   • AST (SGOT) 09/07/2022 26  1 - 40 U/L Final   • Alkaline Phosphatase 09/07/2022 59  39 - 117 U/L Final   • Total Bilirubin 09/07/2022 0.6  0.0 - 1.2 mg/dL Final   • Globulin 09/07/2022 3.1  gm/dL Final   • A/G Ratio 09/07/2022 1.7  g/dL Final   • BUN/Creatinine Ratio 09/07/2022 9.4  7.0 - 25.0 Final   • Anion Gap 09/07/2022 14.2  5.0 - 15.0 mmol/L Final   • eGFR 09/07/2022 89.3  >60.0 mL/min/1.73 Final    National Kidney Foundation and American Society of Nephrology (ASN) Task Force recommended calculation based on the Chronic Kidney Disease Epidemiology Collaboration (CKD-EPI) equation refit without adjustment for race.   • Lipase 09/07/2022 35  13 - 60 U/L Final   • C-Reactive Protein 09/07/2022 <0.30  0.00 - 0.50 mg/dL Final   • Procalcitonin 09/07/2022 0.05  0.00 - 0.25 ng/mL Final   • WBC 09/07/2022 8.25  3.40 - 10.80 10*3/mm3 Final   • RBC 09/07/2022 5.42  4.14 - 5.80 10*6/mm3 Final   • Hemoglobin 09/07/2022 15.6  13.0 - 17.7 g/dL Final   • Hematocrit 09/07/2022 46.9  37.5 - 51.0 % Final   • MCV 09/07/2022 86.5  79.0 - 97.0 fL Final   • MCH 09/07/2022 28.8  26.6 - 33.0 pg Final   • MCHC 09/07/2022 33.3  31.5 - 35.7 g/dL Final   • RDW 09/07/2022 13.2  12.3 - 15.4 % Final   • RDW-SD 09/07/2022 41.2  37.0 - 54.0 fl Final   • MPV 09/07/2022 8.2  6.0 - 12.0 fL Final   • Platelets 09/07/2022 361  140 - 450 10*3/mm3 Final   •  Neutrophil % 09/07/2022 51.9  42.7 - 76.0 % Final   • Lymphocyte % 09/07/2022 36.6  19.6 - 45.3 % Final   • Monocyte % 09/07/2022 7.8  5.0 - 12.0 % Final   • Eosinophil % 09/07/2022 1.8  0.3 - 6.2 % Final   • Basophil % 09/07/2022 1.2  0.0 - 1.5 % Final   • Immature Grans % 09/07/2022 0.7 (A) 0.0 - 0.5 % Final   • Neutrophils, Absolute 09/07/2022 4.28  1.70 - 7.00 10*3/mm3 Final   • Lymphocytes, Absolute 09/07/2022 3.02  0.70 - 3.10 10*3/mm3 Final   • Monocytes, Absolute 09/07/2022 0.64  0.10 - 0.90 10*3/mm3 Final   • Eosinophils, Absolute 09/07/2022 0.15  0.00 - 0.40 10*3/mm3 Final   • Basophils, Absolute 09/07/2022 0.10  0.00 - 0.20 10*3/mm3 Final   • Immature Grans, Absolute 09/07/2022 0.06 (A) 0.00 - 0.05 10*3/mm3 Final   • nRBC 09/07/2022 0.0  0.0 - 0.2 /100 WBC Final         Assessment / Plan         Assessment   Diagnoses and all orders for this visit:    1. Fever in other diseases (Primary)  2. Nausea  3. BRBPR  • STAT labs obtained today and have since resulted.   • H/H within normal limits as well as WBC count and CRP. Slight increase in immature granulocytes. CMP normal. Lipase normal. Procal normal.   • STAT CT ABD/PEL obtained and shows some mild bladder wall thickening.  • UA obtained and is normal. Patient without urinary symptoms at this time.   • COVID/Influenza testing negative.   • Acute symptoms may be 2/2 a viral gastroenteritis. Continue supportive care with rest, adequate oral fluid intake, symptomatic care. PRN zofran prescribed. Monitor for improvement.   • Patient reports the BRBPR has been presenting intermittently x5 months. H/H stable. Encouraged him on keeping the scheduled GI appt next month as he will likely need EGD/Colonoscopy.   -     CBC & Differential  -     C-reactive protein  -     Procalcitonin  -     Lipase  -     CT Abdomen Pelvis Without Contrast  -     Comprehensive Metabolic Panel    5. Bladder wall thickening  • UA unremarkable. Patient reports prior recommendations for  cystoscopy with urology. Encouraged on discussing this once again.   -     Urinalysis With Culture If Indicated -; Future    6. Gastroesophageal reflux disease with esophagitis without hemorrhage  • Will likely require EGD. GI appt pending. Encouraged on keeping this.   • Continue PPI carafate.   • Avoid precipitating foods.   • Elevate head of the bed as tolerating.   • H. Pylori testing previously ordered, but not completed. If patient is willing to come off of ppi x2 weeks, will consider reordering the test if symptoms persist.     Other orders  -     ondansetron (Zofran) 4 MG tablet; Take 1 tablet by mouth Every 8 (Eight) Hours As Needed for Nausea or Vomiting.  Dispense: 30 tablet; Refill: 1       New Medications Ordered This Visit   Medications   • ondansetron (Zofran) 4 MG tablet     Sig: Take 1 tablet by mouth Every 8 (Eight) Hours As Needed for Nausea or Vomiting.     Dispense:  30 tablet     Refill:  1     Health Maintenance  • Consider influenza vaccine when available.     Follow Up   Return in about 1 week (around 9/14/2022) for Recheck.    Patient was given instructions and counseling regarding his condition or for health maintenance advice. Please see specific information pulled into the AVS if appropriate.       This document has been electronically signed by JÚNIOR Velázquez   September 10, 2022 17:50 EDT    Dictated Utilizing Dragon Dictation: Part of this note may be an electronic transcription/translation of spoken language to printed text using the Dragon Dictation System.

## 2022-09-08 ENCOUNTER — LAB (OUTPATIENT)
Dept: FAMILY MEDICINE CLINIC | Facility: CLINIC | Age: 42
End: 2022-09-08

## 2022-09-08 DIAGNOSIS — R05.9 COUGH: Primary | ICD-10-CM

## 2022-09-08 LAB
FLUAV RNA RESP QL NAA+PROBE: NOT DETECTED
FLUBV RNA RESP QL NAA+PROBE: NOT DETECTED
SARS-COV-2 RNA RESP QL NAA+PROBE: NOT DETECTED

## 2022-09-08 PROCEDURE — 87636 SARSCOV2 & INF A&B AMP PRB: CPT | Performed by: PHYSICIAN ASSISTANT

## 2022-09-09 ENCOUNTER — LAB (OUTPATIENT)
Dept: FAMILY MEDICINE CLINIC | Facility: CLINIC | Age: 42
End: 2022-09-09

## 2022-09-09 DIAGNOSIS — N32.89 BLADDER WALL THICKENING: ICD-10-CM

## 2022-09-09 LAB
BILIRUB UR QL STRIP: NEGATIVE
CLARITY UR: CLEAR
COLOR UR: YELLOW
GLUCOSE UR STRIP-MCNC: NEGATIVE MG/DL
HGB UR QL STRIP.AUTO: NEGATIVE
KETONES UR QL STRIP: NEGATIVE
LEUKOCYTE ESTERASE UR QL STRIP.AUTO: NEGATIVE
NITRITE UR QL STRIP: NEGATIVE
PH UR STRIP.AUTO: 6 [PH] (ref 5–8)
PROT UR QL STRIP: NEGATIVE
SP GR UR STRIP: 1.02 (ref 1–1.03)
UROBILINOGEN UR QL STRIP: NORMAL

## 2022-09-09 PROCEDURE — 81003 URINALYSIS AUTO W/O SCOPE: CPT | Performed by: PHYSICIAN ASSISTANT

## 2022-10-05 DIAGNOSIS — R21 RASH: ICD-10-CM

## 2022-10-05 RX ORDER — FAMOTIDINE 20 MG/1
TABLET, FILM COATED ORAL
Qty: 60 TABLET | Refills: 0 | Status: SHIPPED | OUTPATIENT
Start: 2022-10-05 | End: 2022-12-20

## 2022-12-19 DIAGNOSIS — R21 RASH: ICD-10-CM

## 2022-12-19 DIAGNOSIS — K21.00 GASTROESOPHAGEAL REFLUX DISEASE WITH ESOPHAGITIS WITHOUT HEMORRHAGE: ICD-10-CM

## 2022-12-20 RX ORDER — ONDANSETRON 4 MG/1
TABLET, FILM COATED ORAL
Qty: 30 TABLET | Refills: 1 | Status: SHIPPED | OUTPATIENT
Start: 2022-12-20

## 2022-12-20 RX ORDER — FAMOTIDINE 20 MG/1
TABLET, FILM COATED ORAL
Qty: 60 TABLET | Refills: 0 | Status: SHIPPED | OUTPATIENT
Start: 2022-12-20 | End: 2023-02-27

## 2022-12-20 RX ORDER — PANTOPRAZOLE SODIUM 40 MG/1
40 TABLET, DELAYED RELEASE ORAL DAILY
Qty: 30 TABLET | Refills: 5 | Status: SHIPPED | OUTPATIENT
Start: 2022-12-20 | End: 2023-02-27 | Stop reason: SDUPTHER

## 2022-12-27 ENCOUNTER — OFFICE VISIT (OUTPATIENT)
Dept: FAMILY MEDICINE CLINIC | Facility: CLINIC | Age: 42
End: 2022-12-27
Payer: COMMERCIAL

## 2022-12-27 VITALS
DIASTOLIC BLOOD PRESSURE: 70 MMHG | BODY MASS INDEX: 29.33 KG/M2 | TEMPERATURE: 97.1 F | HEART RATE: 117 BPM | HEIGHT: 69 IN | OXYGEN SATURATION: 100 % | SYSTOLIC BLOOD PRESSURE: 128 MMHG | WEIGHT: 198 LBS

## 2022-12-27 DIAGNOSIS — K04.7 DENTAL ABSCESS: ICD-10-CM

## 2022-12-27 DIAGNOSIS — H65.02 NON-RECURRENT ACUTE SEROUS OTITIS MEDIA OF LEFT EAR: Primary | ICD-10-CM

## 2022-12-27 PROCEDURE — 1160F RVW MEDS BY RX/DR IN RCRD: CPT | Performed by: NURSE PRACTITIONER

## 2022-12-27 PROCEDURE — 96372 THER/PROPH/DIAG INJ SC/IM: CPT | Performed by: NURSE PRACTITIONER

## 2022-12-27 PROCEDURE — 99213 OFFICE O/P EST LOW 20 MIN: CPT | Performed by: NURSE PRACTITIONER

## 2022-12-27 PROCEDURE — 1159F MED LIST DOCD IN RCRD: CPT | Performed by: NURSE PRACTITIONER

## 2022-12-27 RX ORDER — CEFTRIAXONE 1 G/1
1 INJECTION, POWDER, FOR SOLUTION INTRAMUSCULAR; INTRAVENOUS EVERY 24 HOURS
Status: SHIPPED | OUTPATIENT
Start: 2022-12-27 | End: 2022-12-30

## 2022-12-27 RX ORDER — KETOROLAC TROMETHAMINE 30 MG/ML
60 INJECTION, SOLUTION INTRAMUSCULAR; INTRAVENOUS ONCE
Status: COMPLETED | OUTPATIENT
Start: 2022-12-27 | End: 2022-12-27

## 2022-12-27 RX ORDER — CLINDAMYCIN HYDROCHLORIDE 300 MG/1
300 CAPSULE ORAL 3 TIMES DAILY
Qty: 30 CAPSULE | Refills: 0 | Status: SHIPPED | OUTPATIENT
Start: 2022-12-27 | End: 2023-02-27

## 2022-12-27 RX ADMIN — CEFTRIAXONE 1 G: 1 INJECTION, POWDER, FOR SOLUTION INTRAMUSCULAR; INTRAVENOUS at 18:17

## 2022-12-27 RX ADMIN — KETOROLAC TROMETHAMINE 60 MG: 30 INJECTION, SOLUTION INTRAMUSCULAR; INTRAVENOUS at 18:18

## 2022-12-27 NOTE — PROGRESS NOTES
Siomara Christian is a 42 y.o. male.     Chief Complaint   Patient presents with   • Facial Swelling       History of Present Illness     Gum and Facial swelling-on the left.  Along his jaw line, along his teeth line.  He reports symptoms started Wednesday and he cannot bear it any longer.  He reports he has not had any fever.  He reports some tenderness of his throat with swallowing.  Some blood when he has rhinorrhea.  He reports some pain in his lymph nodes.  No ear pain but sensitivity is present.  He has chronic sinusitis.  He has some sensation of PND.  No increased dry mouth.  He reports he was too uncomfortable to sleep last night.      The following portions of the patient's history were reviewed and updated as appropriate: CC, ROS, allergies, current medications, past family history, past medical history, past social history, past surgical history and problem list.      Review of Systems   Constitutional: Positive for chills and fatigue. Negative for appetite change, unexpected weight gain and unexpected weight loss.   HENT: Positive for congestion, facial swelling, rhinorrhea, sinus pressure, sore throat and swollen glands. Negative for ear pain, postnasal drip, trouble swallowing and voice change.    Eyes: Negative for pain and visual disturbance.   Respiratory: Negative for cough, chest tightness, shortness of breath and wheezing.    Cardiovascular: Negative for chest pain and palpitations.   Gastrointestinal: Negative for abdominal pain, blood in stool, constipation, diarrhea, nausea and indigestion.   Genitourinary: Negative for dysuria, hematuria and urgency.   Musculoskeletal: Negative for arthralgias and back pain.   Skin: Negative for color change and skin lesions.   Allergic/Immunologic: Negative.    Neurological: Positive for headache. Negative for dizziness, numbness and confusion.   Hematological: Negative.    Psychiatric/Behavioral: Negative for sleep disturbance and suicidal ideas.    All other systems reviewed and are negative.      Objective     /70   Pulse 117   Temp 97.1 °F (36.2 °C) (Temporal)   Ht 175.3 cm (69\")   Wt 89.8 kg (198 lb)   SpO2 100%   BMI 29.24 kg/m²     Physical Exam  Vitals reviewed.   Constitutional:       General: He is not in acute distress.     Appearance: Normal appearance. He is well-developed.   HENT:      Head: Normocephalic and atraumatic.      Jaw: Swelling (mild on left) and pain on movement (left) present.      Salivary Glands: Right salivary gland is not diffusely enlarged or tender.      Comments: Oropharynx not examined.  Patient is presently wearing a face covering/mask due to COVID-19 pandemic.     Right Ear: Ear canal and external ear normal.      Left Ear: Ear canal and external ear normal. Tenderness present. A middle ear effusion is present. Tympanic membrane is not retracted or bulging.      Nose: Congestion present.      Left Sinus: Maxillary sinus tenderness and frontal sinus tenderness present.      Mouth/Throat:      Pharynx: Posterior oropharyngeal erythema (mild) present.   Eyes:      General: No scleral icterus.     Pupils: Pupils are equal, round, and reactive to light.   Neck:      Thyroid: No thyromegaly.      Vascular: No JVD.   Cardiovascular:      Rate and Rhythm: Normal rate and regular rhythm.      Heart sounds: Normal heart sounds.   Pulmonary:      Effort: Pulmonary effort is normal.      Breath sounds: Normal breath sounds.   Abdominal:      General: Bowel sounds are normal.      Palpations: Abdomen is soft.      Tenderness: There is no abdominal tenderness.   Musculoskeletal:      Cervical back: Normal range of motion and neck supple.   Lymphadenopathy:      Cervical: Cervical adenopathy present.      Right cervical: Superficial cervical adenopathy present.      Left cervical: Superficial cervical adenopathy and posterior cervical adenopathy present.   Skin:     General: Skin is warm and dry.      Capillary Refill:  Capillary refill takes less than 2 seconds.   Neurological:      Mental Status: He is alert and oriented to person, place, and time.      Cranial Nerves: No cranial nerve deficit.      Coordination: Coordination normal.      Gait: Gait normal.   Psychiatric:         Behavior: Behavior normal.         Thought Content: Thought content normal.         Judgment: Judgment normal.           Diagnoses and all orders for this visit:    1. Non-recurrent acute serous otitis media of left ear (Primary)  -     cefTRIAXone (ROCEPHIN) injection 1 g  -     clindamycin (Cleocin) 300 MG capsule; Take 1 capsule by mouth 3 (Three) Times a Day.  Dispense: 30 capsule; Refill: 0  -     ketorolac (TORADOL) injection 60 mg    2. Dental abscess  -     cefTRIAXone (ROCEPHIN) injection 1 g  -     clindamycin (Cleocin) 300 MG capsule; Take 1 capsule by mouth 3 (Three) Times a Day.  Dispense: 30 capsule; Refill: 0  -     ketorolac (TORADOL) injection 60 mg        Understands disease processes and need for medications.  Understands reasons for urgent and emergent care.  Patient (& family) verbalized agreement for treatment plan.   Emotional support and active listening provided.  Patient provided time to verbalize feelings.    Rocephin injection for the next 3 days.  Patient understands for any worsening symptoms to report directly to the ER as he has been hospitalized for similar symptoms in the past.    May alternate warm and cool compresses to jaw and face for edema.    RTC PRN 3-5 days for worsening or non resolving symptoms            This document has been electronically signed by:  EMMETT Kumar FNP-C Dragon disclaimer:  Part of this note may be an electronic transcription/translation of spoken language to printed text using the Dragon Dictation System.

## 2022-12-28 ENCOUNTER — TELEPHONE (OUTPATIENT)
Dept: FAMILY MEDICINE CLINIC | Facility: CLINIC | Age: 42
End: 2022-12-28

## 2022-12-28 ENCOUNTER — CLINICAL SUPPORT (OUTPATIENT)
Dept: FAMILY MEDICINE CLINIC | Facility: CLINIC | Age: 42
End: 2022-12-28

## 2022-12-28 DIAGNOSIS — K04.7 DENTAL ABSCESS: ICD-10-CM

## 2022-12-28 PROCEDURE — 96372 THER/PROPH/DIAG INJ SC/IM: CPT | Performed by: NURSE PRACTITIONER

## 2022-12-28 RX ADMIN — CEFTRIAXONE 1 G: 1 INJECTION, POWDER, FOR SOLUTION INTRAMUSCULAR; INTRAVENOUS at 09:04

## 2022-12-28 NOTE — TELEPHONE ENCOUNTER
Caller: Nick Christian    Relationship to patient: Self    Best call back number: [phone]    Patient is needing: PATIENT REQUESTS CALL BACK REGARDING THE STEROID SHOT HE WAS GIVEN TODAY AND SCHEDULED FOR TOMORROW TO RECEIVE ANOTHER.    STATED IT IS NOT HELPING WITH THE PAIN.

## 2022-12-28 NOTE — TELEPHONE ENCOUNTER
Patient notified and states verbal understanding.      ----- Message from EMMETT Kumar sent at 12/28/2022  2:54 PM EST -----  Due to his history and the length of time he has had problems. I advise him to seek emergent care.  He may need imaging of his neck and some IV medication instead   ----- Message -----  From: Gemma Mixon MA  Sent: 12/28/2022   2:35 PM EST  To: EMMETT Kumar    Patient has called and stated his face and neck is now burning , and swelling. Has been miserable since last night. He is requesting advice to help, and wanting to see if you would possibly have another toradol shot and steroids for him.

## 2022-12-28 NOTE — TELEPHONE ENCOUNTER
Patient stated that he doesn't think that its helping. He said that he was first thinking it was infection but know he thinks it may be inflammation in his sinus cavities. He wanted to know what he can do.

## 2023-02-27 ENCOUNTER — OFFICE VISIT (OUTPATIENT)
Dept: FAMILY MEDICINE CLINIC | Facility: CLINIC | Age: 43
End: 2023-02-27
Payer: COMMERCIAL

## 2023-02-27 VITALS
WEIGHT: 198 LBS | DIASTOLIC BLOOD PRESSURE: 82 MMHG | SYSTOLIC BLOOD PRESSURE: 110 MMHG | TEMPERATURE: 97.3 F | BODY MASS INDEX: 29.33 KG/M2 | HEART RATE: 89 BPM | OXYGEN SATURATION: 97 % | HEIGHT: 69 IN

## 2023-02-27 DIAGNOSIS — G47.33 OBSTRUCTIVE SLEEP APNEA: Chronic | ICD-10-CM

## 2023-02-27 DIAGNOSIS — E06.3 HYPOTHYROIDISM DUE TO HASHIMOTO'S THYROIDITIS: Chronic | ICD-10-CM

## 2023-02-27 DIAGNOSIS — R53.83 FATIGUE, UNSPECIFIED TYPE: Primary | ICD-10-CM

## 2023-02-27 DIAGNOSIS — K21.00 GASTROESOPHAGEAL REFLUX DISEASE WITH ESOPHAGITIS WITHOUT HEMORRHAGE: Chronic | ICD-10-CM

## 2023-02-27 DIAGNOSIS — E78.2 MIXED HYPERLIPIDEMIA: Chronic | ICD-10-CM

## 2023-02-27 DIAGNOSIS — E03.8 HYPOTHYROIDISM DUE TO HASHIMOTO'S THYROIDITIS: Chronic | ICD-10-CM

## 2023-02-27 PROCEDURE — 84402 ASSAY OF FREE TESTOSTERONE: CPT | Performed by: PHYSICIAN ASSISTANT

## 2023-02-27 PROCEDURE — 99214 OFFICE O/P EST MOD 30 MIN: CPT | Performed by: PHYSICIAN ASSISTANT

## 2023-02-27 PROCEDURE — 80053 COMPREHEN METABOLIC PANEL: CPT | Performed by: PHYSICIAN ASSISTANT

## 2023-02-27 PROCEDURE — 84403 ASSAY OF TOTAL TESTOSTERONE: CPT | Performed by: PHYSICIAN ASSISTANT

## 2023-02-27 PROCEDURE — 83921 ORGANIC ACID SINGLE QUANT: CPT | Performed by: PHYSICIAN ASSISTANT

## 2023-02-27 PROCEDURE — 85025 COMPLETE CBC W/AUTO DIFF WBC: CPT | Performed by: PHYSICIAN ASSISTANT

## 2023-02-27 PROCEDURE — 80061 LIPID PANEL: CPT | Performed by: PHYSICIAN ASSISTANT

## 2023-02-27 RX ORDER — LEVOTHYROXINE SODIUM 0.12 MG/1
125 TABLET ORAL DAILY
Qty: 30 TABLET | Refills: 5 | Status: SHIPPED | OUTPATIENT
Start: 2023-02-27

## 2023-02-27 RX ORDER — PANTOPRAZOLE SODIUM 40 MG/1
40 TABLET, DELAYED RELEASE ORAL DAILY
Qty: 30 TABLET | Refills: 5 | Status: SHIPPED | OUTPATIENT
Start: 2023-02-27

## 2023-02-27 NOTE — PROGRESS NOTES
"Siomara Christian is a 43 y.o. male.       Chief Complaint -fatigue    History of Present Illness -    ROS    Fatigue-  Patient complains of fatigue.  He states that when he goes to the gym and is exercising he has good amount of energy but if he sits down to rest fatigue hits him very quickly.  Patient does have obstructive sleep apnea which causes some decreased quality and quantity of sleep.  Patient was unable to use the CPAP stating that it was uncomfortable.    Hypothyroidism-  Stable with Synthroid 125 mcg daily    Gastroesophageal reflux disease-stable with pantoprazole and dietary modification    Hyperlipidemia-stable with low-cholesterol diet    The following portions of the patient's history were reviewed and updated as appropriate: allergies, current medications, past family history, past medical history, past social history, past surgical history and problem list.    Review of Systems    Objective  Vital signs:  /82   Pulse 89   Temp 97.3 °F (36.3 °C) (Temporal)   Ht 175.3 cm (69\")   Wt 89.8 kg (198 lb)   SpO2 97%   BMI 29.24 kg/m²     Physical Exam  Vitals and nursing note reviewed.   Constitutional:       Appearance: Normal appearance. He is well-developed.   Eyes:      Extraocular Movements: Extraocular movements intact.      Conjunctiva/sclera: Conjunctivae normal.   Cardiovascular:      Rate and Rhythm: Normal rate and regular rhythm.      Heart sounds: Normal heart sounds. No murmur heard.  Pulmonary:      Effort: Pulmonary effort is normal. No respiratory distress.      Breath sounds: Normal breath sounds. No wheezing.   Musculoskeletal:         General: No tenderness.   Skin:     General: Skin is warm and dry.      Findings: No rash.   Neurological:      Mental Status: He is alert and oriented to person, place, and time.   Psychiatric:         Mood and Affect: Mood normal.         Behavior: Behavior normal.         Thought Content: Thought content normal.         The " following data was reviewed by: JÚNIOR Nicholas on 02/27/2023:  CMP    CMP 9/7/22   Glucose 89   BUN 10   Creatinine 1.06   eGFR 89.3   Sodium 141   Potassium 4.1   Chloride 100   Calcium 9.9   Total Protein 8.2   Albumin 5.12   Globulin 3.1   Total Bilirubin 0.6   Alkaline Phosphatase 59   AST (SGOT) 26   ALT (SGPT) 39   Albumin/Globulin Ratio 1.7   BUN/Creatinine Ratio 9.4   Anion Gap 14.2      Comments are available for some flowsheets but are not being displayed.           CBC w/diff    CBC w/Diff 9/7/22   WBC 8.25   RBC 5.42   Hemoglobin 15.6   Hematocrit 46.9   MCV 86.5   MCH 28.8   MCHC 33.3   RDW 13.2   Platelets 361   Neutrophil Rel % 51.9   Immature Granulocyte Rel % 0.7 (A)   Lymphocyte Rel % 36.6   Monocyte Rel % 7.8   Eosinophil Rel % 1.8   Basophil Rel % 1.2   (A) Abnormal value                TSH    TSH 6/2/22   TSH 1.410                      Assessment & Plan     Diagnoses and all orders for this visit:    1. Fatigue, unspecified type (Primary)  -     Testosterone  -     Testosterone Free Direct  -     Methylmalonic Acid, Serum    2. Hypothyroidism due to Hashimoto's thyroiditis  Comments:  Continue Synthroid 125 mcg daily  Orders:  -     levothyroxine (SYNTHROID, LEVOTHROID) 125 MCG tablet; Take 1 tablet by mouth Daily.  Dispense: 30 tablet; Refill: 5    3. Gastroesophageal reflux disease with esophagitis without hemorrhage  Comments:  Continue pantoprazole  Advised to avoid known trigger foods  Orders:  -     pantoprazole (PROTONIX) 40 MG EC tablet; Take 1 tablet by mouth Daily.  Dispense: 30 tablet; Refill: 5  -     CBC & Differential  -     Comprehensive Metabolic Panel    4. Mixed hyperlipidemia  Comments:  Advised low-cholesterol diet  Lab work drawn today for further evaluation  Orders:  -     CBC & Differential  -     Comprehensive Metabolic Panel  -     Lipid Panel    5. Obstructive sleep apnea  Comments:  Patient unable to tolerate CPAP  Patient declines ENT referral/surgery for sleep  apnea            Patient was given instructions and counseling regarding his condition or for health maintenance advice. Please see specific information pulled into the AVS if appropriate      This document has been electronically signed by:  Tracee Santos PA-C

## 2023-02-28 DIAGNOSIS — E78.2 MIXED HYPERLIPIDEMIA: Primary | ICD-10-CM

## 2023-02-28 LAB
ALBUMIN SERPL-MCNC: 5.1 G/DL (ref 3.5–5.2)
ALBUMIN/GLOB SERPL: 1.6 G/DL
ALP SERPL-CCNC: 63 U/L (ref 39–117)
ALT SERPL W P-5'-P-CCNC: 35 U/L (ref 1–41)
ANION GAP SERPL CALCULATED.3IONS-SCNC: 12 MMOL/L (ref 5–15)
AST SERPL-CCNC: 34 U/L (ref 1–40)
BASOPHILS # BLD AUTO: 0.08 10*3/MM3 (ref 0–0.2)
BASOPHILS NFR BLD AUTO: 1 % (ref 0–1.5)
BILIRUB SERPL-MCNC: 0.3 MG/DL (ref 0–1.2)
BUN SERPL-MCNC: 14 MG/DL (ref 6–20)
BUN/CREAT SERPL: 12.2 (ref 7–25)
CALCIUM SPEC-SCNC: 10.1 MG/DL (ref 8.6–10.5)
CHLORIDE SERPL-SCNC: 99 MMOL/L (ref 98–107)
CHOLEST SERPL-MCNC: 237 MG/DL (ref 0–200)
CO2 SERPL-SCNC: 25 MMOL/L (ref 22–29)
CREAT SERPL-MCNC: 1.15 MG/DL (ref 0.76–1.27)
DEPRECATED RDW RBC AUTO: 42.5 FL (ref 37–54)
EGFRCR SERPLBLD CKD-EPI 2021: 80.5 ML/MIN/1.73
EOSINOPHIL # BLD AUTO: 0.13 10*3/MM3 (ref 0–0.4)
EOSINOPHIL NFR BLD AUTO: 1.7 % (ref 0.3–6.2)
ERYTHROCYTE [DISTWIDTH] IN BLOOD BY AUTOMATED COUNT: 13.9 % (ref 12.3–15.4)
GLOBULIN UR ELPH-MCNC: 3.1 GM/DL
GLUCOSE SERPL-MCNC: 105 MG/DL (ref 65–99)
HCT VFR BLD AUTO: 46.4 % (ref 37.5–51)
HDLC SERPL-MCNC: 46 MG/DL (ref 40–60)
HGB BLD-MCNC: 15.3 G/DL (ref 13–17.7)
IMM GRANULOCYTES # BLD AUTO: 0.08 10*3/MM3 (ref 0–0.05)
IMM GRANULOCYTES NFR BLD AUTO: 1 % (ref 0–0.5)
LDLC SERPL CALC-MCNC: 161 MG/DL (ref 0–100)
LDLC/HDLC SERPL: 3.45 {RATIO}
LYMPHOCYTES # BLD AUTO: 2.33 10*3/MM3 (ref 0.7–3.1)
LYMPHOCYTES NFR BLD AUTO: 29.9 % (ref 19.6–45.3)
MCH RBC QN AUTO: 27.9 PG (ref 26.6–33)
MCHC RBC AUTO-ENTMCNC: 33 G/DL (ref 31.5–35.7)
MCV RBC AUTO: 84.7 FL (ref 79–97)
MONOCYTES # BLD AUTO: 0.61 10*3/MM3 (ref 0.1–0.9)
MONOCYTES NFR BLD AUTO: 7.8 % (ref 5–12)
NEUTROPHILS NFR BLD AUTO: 4.56 10*3/MM3 (ref 1.7–7)
NEUTROPHILS NFR BLD AUTO: 58.6 % (ref 42.7–76)
NRBC BLD AUTO-RTO: 0 /100 WBC (ref 0–0.2)
PLATELET # BLD AUTO: 386 10*3/MM3 (ref 140–450)
PMV BLD AUTO: 8.8 FL (ref 6–12)
POTASSIUM SERPL-SCNC: 4 MMOL/L (ref 3.5–5.2)
PROT SERPL-MCNC: 8.2 G/DL (ref 6–8.5)
RBC # BLD AUTO: 5.48 10*6/MM3 (ref 4.14–5.8)
SODIUM SERPL-SCNC: 136 MMOL/L (ref 136–145)
TESTOST SERPL-MCNC: 263 NG/DL (ref 249–836)
TRIGL SERPL-MCNC: 162 MG/DL (ref 0–150)
VLDLC SERPL-MCNC: 30 MG/DL (ref 5–40)
WBC NRBC COR # BLD: 7.79 10*3/MM3 (ref 3.4–10.8)

## 2023-02-28 RX ORDER — ROSUVASTATIN CALCIUM 10 MG/1
10 TABLET, COATED ORAL DAILY
Qty: 30 TABLET | Refills: 5 | Status: SHIPPED | OUTPATIENT
Start: 2023-02-28

## 2023-03-02 DIAGNOSIS — E03.8 HYPOTHYROIDISM DUE TO HASHIMOTO'S THYROIDITIS: Chronic | ICD-10-CM

## 2023-03-02 DIAGNOSIS — E06.3 HYPOTHYROIDISM DUE TO HASHIMOTO'S THYROIDITIS: Chronic | ICD-10-CM

## 2023-03-02 LAB — METHYLMALONATE SERPL-SCNC: 225 NMOL/L (ref 0–378)

## 2023-03-02 RX ORDER — LEVOTHYROXINE SODIUM 0.12 MG/1
125 TABLET ORAL DAILY
Qty: 30 TABLET | Refills: 5 | OUTPATIENT
Start: 2023-03-02

## 2023-03-04 LAB — TESTOST FREE SERPL-MCNC: 8.6 PG/ML (ref 6.8–21.5)

## 2023-06-05 RX ORDER — ONDANSETRON 4 MG/1
TABLET, FILM COATED ORAL
Qty: 30 TABLET | Refills: 1 | Status: SHIPPED | OUTPATIENT
Start: 2023-06-05

## 2023-06-19 DIAGNOSIS — E03.8 HYPOTHYROIDISM DUE TO HASHIMOTO'S THYROIDITIS: Chronic | ICD-10-CM

## 2023-06-19 DIAGNOSIS — E06.3 HYPOTHYROIDISM DUE TO HASHIMOTO'S THYROIDITIS: Chronic | ICD-10-CM

## 2023-06-19 RX ORDER — LEVOTHYROXINE SODIUM 0.12 MG/1
125 TABLET ORAL DAILY
Qty: 30 TABLET | Refills: 5 | Status: SHIPPED | OUTPATIENT
Start: 2023-06-19

## 2023-07-29 DIAGNOSIS — K21.00 GASTROESOPHAGEAL REFLUX DISEASE WITH ESOPHAGITIS WITHOUT HEMORRHAGE: Chronic | ICD-10-CM

## 2023-07-31 DIAGNOSIS — K21.00 GASTROESOPHAGEAL REFLUX DISEASE WITH ESOPHAGITIS WITHOUT HEMORRHAGE: Chronic | ICD-10-CM

## 2023-07-31 RX ORDER — SUCRALFATE 1 G/1
TABLET ORAL
Qty: 120 TABLET | Refills: 0 | Status: SHIPPED | OUTPATIENT
Start: 2023-07-31

## 2023-07-31 RX ORDER — SUCRALFATE 1 G/1
TABLET ORAL
Qty: 360 TABLET | OUTPATIENT
Start: 2023-07-31

## 2023-08-15 ENCOUNTER — OFFICE VISIT (OUTPATIENT)
Dept: FAMILY MEDICINE CLINIC | Facility: CLINIC | Age: 43
End: 2023-08-15
Payer: COMMERCIAL

## 2023-08-15 VITALS
OXYGEN SATURATION: 96 % | DIASTOLIC BLOOD PRESSURE: 82 MMHG | HEART RATE: 110 BPM | WEIGHT: 200 LBS | BODY MASS INDEX: 29.62 KG/M2 | HEIGHT: 69 IN | TEMPERATURE: 97.8 F | SYSTOLIC BLOOD PRESSURE: 120 MMHG

## 2023-08-15 DIAGNOSIS — J06.9 ACUTE URI: Primary | ICD-10-CM

## 2023-08-15 DIAGNOSIS — E78.2 MIXED HYPERLIPIDEMIA: Chronic | ICD-10-CM

## 2023-08-15 PROBLEM — R11.2 NAUSEA AND VOMITING: Status: ACTIVE | Noted: 2023-08-07

## 2023-08-15 PROCEDURE — 1160F RVW MEDS BY RX/DR IN RCRD: CPT | Performed by: PHYSICIAN ASSISTANT

## 2023-08-15 PROCEDURE — 1159F MED LIST DOCD IN RCRD: CPT | Performed by: PHYSICIAN ASSISTANT

## 2023-08-15 PROCEDURE — 99213 OFFICE O/P EST LOW 20 MIN: CPT | Performed by: PHYSICIAN ASSISTANT

## 2023-08-15 NOTE — LETTER
August 15, 2023     Patient: Nick Christian   YOB: 1980   Date of Visit: 8/15/2023       To Whom It May Concern:    Nick Christian can return to work on 8/16/23 without restrictions.           Sincerely,        JÚNIOR Nicholas    CC: No Recipients

## 2023-08-15 NOTE — PROGRESS NOTES
"Siomara Christian is a 43 y.o. male.       Chief Complaint -upper respiratory infection    History of Present Illness -       Upper respiratory infection-  Patient complained of fever chills nausea dry heaves dry cough and nasal congestion that began on 8/6/2023 while in [place].  He went to [facility] where he was diagnosed with viral upper respiratory infection with cough, nausea and vomiting.  He was treated symptomatically.  Patient tested negative for influenza A, influenza B, and COVID-19.  Symptoms have resolved and patient denies any shortness of breath cough nausea or fever within the last 2 days.  He does need medical clearance to return to work as a .    Hyperlipidemia-stable with rosuvastatin and low-cholesterol diet    The following portions of the patient's history were reviewed and updated as appropriate: allergies, current medications, past family history, past medical history, past social history, past surgical history and problem list.        Objective  Vital signs:  /82   Pulse 110   Temp 97.8 øF (36.6 øC) (Temporal)   Ht 175.3 cm (69\")   Wt 90.7 kg (200 lb)   SpO2 96%   BMI 29.53 kg/mý     Physical Exam  Vitals and nursing note reviewed.   Constitutional:       General: He is not in acute distress.     Appearance: Normal appearance. He is well-developed. He is not diaphoretic.   HENT:      Head: Normocephalic and atraumatic.      Right Ear: Tympanic membrane, ear canal and external ear normal.      Left Ear: Tympanic membrane, ear canal and external ear normal.      Nose: Nose normal.      Mouth/Throat:      Mouth: Mucous membranes are moist.      Pharynx: No oropharyngeal exudate or posterior oropharyngeal erythema.   Eyes:      Extraocular Movements: Extraocular movements intact.      Conjunctiva/sclera: Conjunctivae normal.   Neck:      Thyroid: No thyromegaly.   Cardiovascular:      Rate and Rhythm: Normal rate and regular rhythm.      Heart sounds: " Normal heart sounds. No murmur heard.  Pulmonary:      Effort: Pulmonary effort is normal. No respiratory distress.      Breath sounds: Normal breath sounds. No wheezing or rales.   Musculoskeletal:         General: No tenderness.      Cervical back: Normal range of motion and neck supple.   Lymphadenopathy:      Cervical: No cervical adenopathy.   Skin:     General: Skin is warm and dry.      Findings: No rash.   Neurological:      Mental Status: He is alert and oriented to person, place, and time.   Psychiatric:         Mood and Affect: Mood normal.         Behavior: Behavior normal.         Thought Content: Thought content normal.       The following data was reviewed by Tracee Santos PA-C:         Lab Results   Component Value Date    BUN 14 02/27/2023    CREATININE 1.15 02/27/2023    EGFR 80.5 02/27/2023    ALT 35 02/27/2023    AST 34 02/27/2023    WBC 7.79 02/27/2023    HGB 15.3 02/27/2023    HCT 46.4 02/27/2023     02/27/2023    CHOL 237 (H) 02/27/2023    TRIG 162 (H) 02/27/2023    HDL 46 02/27/2023     (H) 02/27/2023    TSH 1.410 06/02/2022           Assessment & Plan     Diagnoses and all orders for this visit:    1. Acute URI (Primary)  Comments:  He is cleared to return to work without restrictions as symptoms have improved and no fever for over 48 hours    2. Mixed hyperlipidemia  Comments:  Advised low-cholesterol diet  Continue rosuvastatin        BMI is >= 25 and <30. (Overweight) The following options were offered after discussion;: exercise counseling/recommendations and nutrition counseling/recommendations          Patient was given instructions and counseling regarding his condition or for health maintenance advice. Please see specific information pulled into the AVS if appropriate      This document has been electronically signed by:  Tracee Santos PA-C

## 2023-08-15 NOTE — PATIENT INSTRUCTIONS
"Fat and Cholesterol Restricted Eating Plan  Getting too much fat and cholesterol in your diet may cause health problems. Choosing the right foods helps keep your fat and cholesterol at normal levels. This can keep you from getting certain diseases.  Your doctor may recommend an eating plan that includes:  Total fat: ______% or less of total calories a day. This is ______g of fat a day.  Saturated fat: ______% or less of total calories a day. This is ______g of saturated fat a day.  Cholesterol: less than _________mg a day.  Fiber: ______g a day.  What are tips for following this plan?  General tips  Work with your doctor to lose weight if you need to.  Avoid:  Foods with added sugar.  Fried foods.  Foods with trans fat or partially hydrogenated oils. This includes some margarines and baked goods.  If you drink alcohol:  Limit how much you have to:  0-1 drink a day for women who are not pregnant.  0-2 drinks a day for men.  Know how much alcohol is in a drink. In the [place] one drink equals one 12 oz bottle of beer (355 mL), one 5 oz glass of wine (148 mL), or one 1« oz glass of hard liquor (44 mL).  Reading food labels  Check food labels for:  Trans fats.  Partially hydrogenated oils.  Saturated fat (g) in each serving.  Cholesterol (mg) in each serving.  Fiber (g) in each serving.  Choose foods with healthy fats, such as:  Monounsaturated fats and polyunsaturated fats. These include olive and canola oil, flaxseeds, walnuts, almonds, and seeds.  Omega-3 fats. These are found in certain fish, flaxseed oil, and ground flaxseeds.  Choose grain products that have whole grains. Look for the word \"whole\" as the first word in the ingredient list.  Cooking  Cook foods using low-fat methods. These include baking, boiling, grilling, and broiling.  Eat more home-cooked foods. Eat at restaurants and buffets less often. Eat less fast food.  Avoid cooking using saturated fats, such as butter, cream, palm oil, palm kernel oil, and " coconut oil.  Meal planning    At meals, divide your plate into four equal parts:  Fill one-half of your plate with vegetables, green salads, and fruit.  Fill one-fourth of your plate with whole grains.  Fill one-fourth of your plate with low-fat (lean) protein foods.  Eat fish that is high in omega-3 fats at least two times a week. This includes mackerel, tuna, sardines, and salmon.  Eat foods that are high in fiber, such as whole grains, beans, apples, pears, berries, broccoli, carrots, peas, and barley.  What foods should I eat?  Fruits  All fresh, canned (in natural juice), or frozen fruits.  Vegetables  Fresh or frozen vegetables (raw, steamed, roasted, or grilled). Green salads.  Grains  Whole grains, such as whole wheat or whole grain breads, crackers, cereals, and pasta. Unsweetened oatmeal, bulgur, barley, quinoa, or brown rice. Corn or whole wheat flour tortillas.  Meats and other protein foods  Ground beef (85% or leaner), grass-fed beef, or beef trimmed of fat. Skinless chicken or turkey. Ground chicken or turkey. Pork trimmed of fat. All fish and seafood. Egg whites. Dried beans, peas, or lentils. Unsalted nuts or seeds. Unsalted canned beans. Nut butters without added sugar or oil.  Dairy  Low-fat or nonfat dairy products, such as skim or 1% milk, 2% or reduced-fat cheeses, low-fat and fat-free ricotta or cottage cheese, or plain low-fat and nonfat yogurt.  Fats and oils  Tub margarine without trans fats. Light or reduced-fat mayonnaise and salad dressings. Avocado. Olive, canola, sesame, or safflower oils.  The items listed above may not be a complete list of foods and beverages you can eat. Contact a dietitian for more information.  What foods should I avoid?  Fruits  Canned fruit in heavy syrup. Fruit in cream or butter sauce. Fried fruit.  Vegetables  Vegetables cooked in cheese, cream, or butter sauce. Fried vegetables.  Grains  White bread. White pasta. White rice. Cornbread. Bagels, pastries,  and croissants. Crackers and snack foods that contain trans fat and hydrogenated oils.  Meats and other protein foods  Fatty cuts of meat. Ribs, chicken wings, jean, sausage, bologna, salami, chitterlings, fatback, hot dogs, bratwurst, and packaged lunch meats. Liver and organ meats. Whole eggs and egg yolks. Chicken and turkey with skin. Fried meat.  Dairy  Whole or 2% milk, cream, half-and-half, and cream cheese. Whole milk cheeses. Whole-fat or sweetened yogurt. Full-fat cheeses. Nondairy creamers and whipped toppings. Processed cheese, cheese spreads, and cheese curds.  Fats and oils  Butter, stick margarine, lard, shortening, ghee, or jean fat. Coconut, palm kernel, and palm oils.  Beverages  Alcohol. Sugar-sweetened drinks such as sodas, lemonade, and fruit drinks.  Sweets and desserts  Corn syrup, sugars, honey, and molasses. Candy. Jam and jelly. Syrup. Sweetened cereals. Cookies, pies, cakes, donuts, muffins, and ice cream.  The items listed above may not be a complete list of foods and beverages you should avoid. Contact a dietitian for more information.  Summary  Choosing the right foods helps keep your fat and cholesterol at normal levels. This can keep you from getting certain diseases.  At meals, fill one-half of your plate with vegetables, green salads, and fruits.  Eat high fiber foods, like whole grains, beans, apples, pears, berries, carrots, peas, and barley.  Limit added sugar, saturated fats, alcohol, and fried foods.  This information is not intended to replace advice given to you by your health care provider. Make sure you discuss any questions you have with your health care provider.  Document Revised: 04/29/2022 Document Reviewed: 04/29/2022  Elsevier Patient Education c 2023 [employer]

## 2023-08-24 ENCOUNTER — OFFICE VISIT (OUTPATIENT)
Dept: FAMILY MEDICINE CLINIC | Facility: CLINIC | Age: 43
End: 2023-08-24
Payer: COMMERCIAL

## 2023-08-24 VITALS
HEART RATE: 88 BPM | HEIGHT: 69 IN | BODY MASS INDEX: 31.84 KG/M2 | TEMPERATURE: 98 F | SYSTOLIC BLOOD PRESSURE: 132 MMHG | DIASTOLIC BLOOD PRESSURE: 80 MMHG | WEIGHT: 215 LBS | OXYGEN SATURATION: 97 %

## 2023-08-24 DIAGNOSIS — E06.3 HYPOTHYROIDISM DUE TO HASHIMOTO'S THYROIDITIS: Chronic | ICD-10-CM

## 2023-08-24 DIAGNOSIS — L25.5 DERMATITIS DUE TO PLANT: Primary | ICD-10-CM

## 2023-08-24 DIAGNOSIS — E78.2 MIXED HYPERLIPIDEMIA: Chronic | ICD-10-CM

## 2023-08-24 DIAGNOSIS — K21.00 GASTROESOPHAGEAL REFLUX DISEASE WITH ESOPHAGITIS WITHOUT HEMORRHAGE: Chronic | ICD-10-CM

## 2023-08-24 DIAGNOSIS — E03.8 HYPOTHYROIDISM DUE TO HASHIMOTO'S THYROIDITIS: Chronic | ICD-10-CM

## 2023-08-24 RX ORDER — PANTOPRAZOLE SODIUM 40 MG/1
40 TABLET, DELAYED RELEASE ORAL DAILY
Qty: 30 TABLET | Refills: 5 | Status: SHIPPED | OUTPATIENT
Start: 2023-08-24

## 2023-08-24 RX ORDER — ONDANSETRON 4 MG/1
4 TABLET, FILM COATED ORAL EVERY 8 HOURS PRN
Qty: 30 TABLET | Refills: 1 | Status: SHIPPED | OUTPATIENT
Start: 2023-08-24

## 2023-08-24 RX ORDER — TRIAMCINOLONE ACETONIDE 5 MG/G
1 CREAM TOPICAL 3 TIMES DAILY
Qty: 60 G | Refills: 0 | Status: SHIPPED | OUTPATIENT
Start: 2023-08-24

## 2023-08-24 RX ORDER — METHYLPREDNISOLONE ACETATE 80 MG/ML
80 INJECTION, SUSPENSION INTRA-ARTICULAR; INTRALESIONAL; INTRAMUSCULAR; SOFT TISSUE ONCE
Status: COMPLETED | OUTPATIENT
Start: 2023-08-24 | End: 2023-08-24

## 2023-08-24 RX ORDER — ROSUVASTATIN CALCIUM 10 MG/1
10 TABLET, COATED ORAL DAILY
Qty: 30 TABLET | Refills: 5 | Status: SHIPPED | OUTPATIENT
Start: 2023-08-24

## 2023-08-24 RX ORDER — LEVOTHYROXINE SODIUM 0.12 MG/1
125 TABLET ORAL DAILY
Qty: 30 TABLET | Refills: 5 | Status: SHIPPED | OUTPATIENT
Start: 2023-08-24

## 2023-08-24 RX ADMIN — METHYLPREDNISOLONE ACETATE 80 MG: 80 INJECTION, SUSPENSION INTRA-ARTICULAR; INTRALESIONAL; INTRAMUSCULAR; SOFT TISSUE at 15:32

## 2023-08-24 NOTE — PROGRESS NOTES
"Siomara Christian is a 43 y.o. male.       Chief Complaint -rash     History of Present Illness -       rash-  Patient complains of pruritic rash on bilateral upper extremities and Trunk after exposure to poison sumac.  Onset 3 days.  Minimal relief with Benadryl.    Hypothyroidism-  Stable with Synthroid 125 mcg daily    Gastroesophageal reflux disease-stable with pantoprazole    Hyperlipidemia-stable with rosuvastatin and low-cholesterol diet    The following portions of the patient's history were reviewed and updated as appropriate: allergies, current medications, past family history, past medical history, past social history, past surgical history and problem list.        Objective  Vital signs:  /80   Pulse 88   Temp 98 øF (36.7 øC) (Temporal)   Ht 175.3 cm (69\")   Wt 97.5 kg (215 lb)   SpO2 97%   BMI 31.75 kg/mý     Physical Exam  Vitals and nursing note reviewed.   Constitutional:       Appearance: Normal appearance. He is well-developed.   Eyes:      Extraocular Movements: Extraocular movements intact.      Conjunctiva/sclera: Conjunctivae normal.   Cardiovascular:      Rate and Rhythm: Normal rate and regular rhythm.      Heart sounds: Normal heart sounds. No murmur heard.  Pulmonary:      Effort: Pulmonary effort is normal. No respiratory distress.      Breath sounds: Normal breath sounds. No wheezing.   Musculoskeletal:         General: No tenderness.   Skin:     General: Skin is warm and dry.      Findings: Rash present.      Comments: Scattered erythematous vesicular's along bilateral upper extremities and torso   Neurological:      Mental Status: He is alert and oriented to person, place, and time.   Psychiatric:         Mood and Affect: Mood normal.         Behavior: Behavior normal.         Thought Content: Thought content normal.       The following data was reviewed by Tracee Santos PA-C:         Lab Results   Component Value Date    BUN 14 02/27/2023    CREATININE 1.15 02/27/2023 "    EGFR 80.5 02/27/2023    ALT 35 02/27/2023    AST 34 02/27/2023    WBC 7.79 02/27/2023    HGB 15.3 02/27/2023    HCT 46.4 02/27/2023     02/27/2023    CHOL 237 (H) 02/27/2023    TRIG 162 (H) 02/27/2023    HDL 46 02/27/2023     (H) 02/27/2023    TSH 1.410 06/02/2022           Assessment & Plan     Diagnoses and all orders for this visit:    1. Dermatitis due to plant (Primary)  Comments:  Advised use of Benadryl as needed  Advised cleaning of home linens to prevent recurrence  Orders:  -     methylPREDNISolone acetate (DEPO-medrol) injection 80 mg  -     triamcinolone (KENALOG) 0.5 % cream; Apply 1 application  topically to the appropriate area as directed 3 (Three) Times a Day.  Dispense: 60 g; Refill: 0    2. Hypothyroidism due to Hashimoto's thyroiditis  Comments:  Continue Synthroid 125 mcg daily  Orders:  -     levothyroxine (SYNTHROID, LEVOTHROID) 125 MCG tablet; Take 1 tablet by mouth Daily.  Dispense: 30 tablet; Refill: 5    3. Gastroesophageal reflux disease with esophagitis without hemorrhage  Comments:  Continue pantoprazole  Advised to avoid known trigger foods  Orders:  -     pantoprazole (PROTONIX) 40 MG EC tablet; Take 1 tablet by mouth Daily.  Dispense: 30 tablet; Refill: 5  -     ondansetron (ZOFRAN) 4 MG tablet; Take 1 tablet by mouth Every 8 (Eight) Hours As Needed for Nausea or Vomiting.  Dispense: 30 tablet; Refill: 1    4. Mixed hyperlipidemia  Comments:  Continue rosuvastatin  Advised low-cholesterol diet  Orders:  -     rosuvastatin (Crestor) 10 MG tablet; Take 1 tablet by mouth Daily.  Dispense: 30 tablet; Refill: 5                   Patient was given instructions and counseling regarding his condition or for health maintenance advice. Please see specific information pulled into the AVS if appropriate      This document has been electronically signed by:  Tracee Santos PA-C

## 2023-08-24 NOTE — PATIENT INSTRUCTIONS
"Fat and Cholesterol Restricted Eating Plan  Getting too much fat and cholesterol in your diet may cause health problems. Choosing the right foods helps keep your fat and cholesterol at normal levels. This can keep you from getting certain diseases.  Your doctor may recommend an eating plan that includes:  Total fat: ______% or less of total calories a day. This is ______g of fat a day.  Saturated fat: ______% or less of total calories a day. This is ______g of saturated fat a day.  Cholesterol: less than _________mg a day.  Fiber: ______g a day.  What are tips for following this plan?  General tips  Work with your doctor to lose weight if you need to.  Avoid:  Foods with added sugar.  Fried foods.  Foods with trans fat or partially hydrogenated oils. This includes some margarines and baked goods.  If you drink alcohol:  Limit how much you have to:  0-1 drink a day for women who are not pregnant.  0-2 drinks a day for men.  Know how much alcohol is in a drink. In the [place] one drink equals one 12 oz bottle of beer (355 mL), one 5 oz glass of wine (148 mL), or one 1« oz glass of hard liquor (44 mL).  Reading food labels  Check food labels for:  Trans fats.  Partially hydrogenated oils.  Saturated fat (g) in each serving.  Cholesterol (mg) in each serving.  Fiber (g) in each serving.  Choose foods with healthy fats, such as:  Monounsaturated fats and polyunsaturated fats. These include olive and canola oil, flaxseeds, walnuts, almonds, and seeds.  Omega-3 fats. These are found in certain fish, flaxseed oil, and ground flaxseeds.  Choose grain products that have whole grains. Look for the word \"whole\" as the first word in the ingredient list.  Cooking  Cook foods using low-fat methods. These include baking, boiling, grilling, and broiling.  Eat more home-cooked foods. Eat at restaurants and buffets less often. Eat less fast food.  Avoid cooking using saturated fats, such as butter, cream, palm oil, palm kernel oil, and " coconut oil.  Meal planning    At meals, divide your plate into four equal parts:  Fill one-half of your plate with vegetables, green salads, and fruit.  Fill one-fourth of your plate with whole grains.  Fill one-fourth of your plate with low-fat (lean) protein foods.  Eat fish that is high in omega-3 fats at least two times a week. This includes mackerel, tuna, sardines, and salmon.  Eat foods that are high in fiber, such as whole grains, beans, apples, pears, berries, broccoli, carrots, peas, and barley.  What foods should I eat?  Fruits  All fresh, canned (in natural juice), or frozen fruits.  Vegetables  Fresh or frozen vegetables (raw, steamed, roasted, or grilled). Green salads.  Grains  Whole grains, such as whole wheat or whole grain breads, crackers, cereals, and pasta. Unsweetened oatmeal, bulgur, barley, quinoa, or brown rice. Corn or whole wheat flour tortillas.  Meats and other protein foods  Ground beef (85% or leaner), grass-fed beef, or beef trimmed of fat. Skinless chicken or turkey. Ground chicken or turkey. Pork trimmed of fat. All fish and seafood. Egg whites. Dried beans, peas, or lentils. Unsalted nuts or seeds. Unsalted canned beans. Nut butters without added sugar or oil.  Dairy  Low-fat or nonfat dairy products, such as skim or 1% milk, 2% or reduced-fat cheeses, low-fat and fat-free ricotta or cottage cheese, or plain low-fat and nonfat yogurt.  Fats and oils  Tub margarine without trans fats. Light or reduced-fat mayonnaise and salad dressings. Avocado. Olive, canola, sesame, or safflower oils.  The items listed above may not be a complete list of foods and beverages you can eat. Contact a dietitian for more information.  What foods should I avoid?  Fruits  Canned fruit in heavy syrup. Fruit in cream or butter sauce. Fried fruit.  Vegetables  Vegetables cooked in cheese, cream, or butter sauce. Fried vegetables.  Grains  White bread. White pasta. White rice. Cornbread. Bagels, pastries,  and croissants. Crackers and snack foods that contain trans fat and hydrogenated oils.  Meats and other protein foods  Fatty cuts of meat. Ribs, chicken wings, jean, sausage, bologna, salami, chitterlings, fatback, hot dogs, bratwurst, and packaged lunch meats. Liver and organ meats. Whole eggs and egg yolks. Chicken and turkey with skin. Fried meat.  Dairy  Whole or 2% milk, cream, half-and-half, and cream cheese. Whole milk cheeses. Whole-fat or sweetened yogurt. Full-fat cheeses. Nondairy creamers and whipped toppings. Processed cheese, cheese spreads, and cheese curds.  Fats and oils  Butter, stick margarine, lard, shortening, ghee, or jean fat. Coconut, palm kernel, and palm oils.  Beverages  Alcohol. Sugar-sweetened drinks such as sodas, lemonade, and fruit drinks.  Sweets and desserts  Corn syrup, sugars, honey, and molasses. Candy. Jam and jelly. Syrup. Sweetened cereals. Cookies, pies, cakes, donuts, muffins, and ice cream.  The items listed above may not be a complete list of foods and beverages you should avoid. Contact a dietitian for more information.  Summary  Choosing the right foods helps keep your fat and cholesterol at normal levels. This can keep you from getting certain diseases.  At meals, fill one-half of your plate with vegetables, green salads, and fruits.  Eat high fiber foods, like whole grains, beans, apples, pears, berries, carrots, peas, and barley.  Limit added sugar, saturated fats, alcohol, and fried foods.  This information is not intended to replace advice given to you by your health care provider. Make sure you discuss any questions you have with your health care provider.  Document Revised: 04/29/2022 Document Reviewed: 04/29/2022  Elsevier Patient Education c 2023 [employer]

## 2023-08-31 DIAGNOSIS — E78.2 MIXED HYPERLIPIDEMIA: Chronic | ICD-10-CM

## 2023-08-31 RX ORDER — ROSUVASTATIN CALCIUM 10 MG/1
10 TABLET, COATED ORAL DAILY
Qty: 30 TABLET | Refills: 5 | OUTPATIENT
Start: 2023-08-31

## 2023-09-07 DIAGNOSIS — K21.00 GASTROESOPHAGEAL REFLUX DISEASE WITH ESOPHAGITIS WITHOUT HEMORRHAGE: Chronic | ICD-10-CM

## 2023-09-07 RX ORDER — SUCRALFATE 1 G/1
TABLET ORAL
Qty: 120 TABLET | Refills: 0 | Status: SHIPPED | OUTPATIENT
Start: 2023-09-07

## 2023-11-20 DIAGNOSIS — E78.2 MIXED HYPERLIPIDEMIA: Chronic | ICD-10-CM

## 2023-11-20 RX ORDER — ROSUVASTATIN CALCIUM 10 MG/1
10 TABLET, COATED ORAL DAILY
Qty: 30 TABLET | Refills: 5 | Status: SHIPPED | OUTPATIENT
Start: 2023-11-20

## 2023-11-20 NOTE — TELEPHONE ENCOUNTER
Caller: Nick Christian    Relationship: Self    Best call back number: [phone]     Requested Prescriptions:   Requested Prescriptions     Pending Prescriptions Disp Refills    rosuvastatin (Crestor) 10 MG tablet 30 tablet 5     Sig: Take 1 tablet by mouth Daily.        Pharmacy where request should be sent: [id number] [address] AT [facility] [address] - [phone] PH - [phone] FX     Last office visit with prescribing clinician: 8/24/2023   Last telemedicine visit with prescribing clinician: Visit date not found   Next office visit with prescribing clinician: 12/28/2023     Additional details provided by patient:     Does the patient have less than a 3 day supply:  [x] Yes  [] No    Would you like a call back once the refill request has been completed: [] Yes [x] No    If the office needs to give you a call back, can they leave a voicemail: [] Yes [x] No    Tian Barahona Rep   11/20/23 12:07 EST         
[facility] [phone]   RN will call prior to visit  Physical therapy to follow  Services to start day following discharge

## 2024-08-12 ENCOUNTER — TELEPHONE (OUTPATIENT)
Dept: FAMILY MEDICINE CLINIC | Facility: CLINIC | Age: 44
End: 2024-08-12

## 2024-09-12 DIAGNOSIS — E06.3 HYPOTHYROIDISM DUE TO HASHIMOTO'S THYROIDITIS: Chronic | ICD-10-CM

## 2024-09-12 DIAGNOSIS — K21.00 GASTROESOPHAGEAL REFLUX DISEASE WITH ESOPHAGITIS WITHOUT HEMORRHAGE: Chronic | ICD-10-CM

## 2024-09-12 DIAGNOSIS — E03.8 HYPOTHYROIDISM DUE TO HASHIMOTO'S THYROIDITIS: Chronic | ICD-10-CM

## 2024-09-12 RX ORDER — LEVOTHYROXINE SODIUM 125 UG/1
125 TABLET ORAL DAILY
Qty: 30 TABLET | Refills: 5 | Status: SHIPPED | OUTPATIENT
Start: 2024-09-12

## 2024-09-12 RX ORDER — PANTOPRAZOLE SODIUM 40 MG/1
40 TABLET, DELAYED RELEASE ORAL DAILY
Qty: 30 TABLET | Refills: 5 | Status: SHIPPED | OUTPATIENT
Start: 2024-09-12

## 2025-03-14 ENCOUNTER — OFFICE VISIT (OUTPATIENT)
Dept: FAMILY MEDICINE CLINIC | Facility: CLINIC | Age: 45
End: 2025-03-14

## 2025-03-14 VITALS
TEMPERATURE: 97.4 F | HEART RATE: 94 BPM | WEIGHT: 217.4 LBS | OXYGEN SATURATION: 94 % | HEIGHT: 69 IN | RESPIRATION RATE: 20 BRPM | DIASTOLIC BLOOD PRESSURE: 84 MMHG | BODY MASS INDEX: 32.2 KG/M2 | SYSTOLIC BLOOD PRESSURE: 130 MMHG

## 2025-03-14 DIAGNOSIS — J40 BRONCHITIS: Primary | ICD-10-CM

## 2025-03-14 RX ORDER — DOXYCYCLINE 100 MG/1
100 CAPSULE ORAL 2 TIMES DAILY
Qty: 20 CAPSULE | Refills: 0 | Status: SHIPPED | OUTPATIENT
Start: 2025-03-14 | End: 2025-03-24

## 2025-03-14 RX ORDER — TIOTROPIUM BROMIDE INHALATION SPRAY 3.12 UG/1
2 SPRAY, METERED RESPIRATORY (INHALATION)
Qty: 4 G | Refills: 0 | COMMUNITY
Start: 2025-03-14

## 2025-03-14 NOTE — PROGRESS NOTES
"Chief Complaint -cough    History of Present Illness -     Nick Christian is a 45 y.o. male.     Cough-  Patient complains of productive cough with thick yellow sputum, sore throat, wheezing, fatigue and shortness of breath for the last 5 days.      The following portions of the patient's history were reviewed and updated as appropriate: allergies, current medications, past family history, past medical history, past social history, past surgical history and problem list.        Objective  Vital signs:  /84 (BP Location: Left arm, Patient Position: Sitting, Cuff Size: Adult)   Pulse 94   Temp 97.4 °F (36.3 °C) (Temporal)   Resp 20   Ht 175.3 cm (69\")   Wt 98.6 kg (217 lb 6.4 oz)   SpO2 94%   BMI 32.10 kg/m²     Physical Exam  Vitals and nursing note reviewed.   Constitutional:       General: He is not in acute distress.     Appearance: Normal appearance. He is well-developed. He is not diaphoretic.   HENT:      Head: Normocephalic and atraumatic.      Right Ear: Tympanic membrane, ear canal and external ear normal.      Left Ear: Tympanic membrane, ear canal and external ear normal.      Nose: Rhinorrhea present.      Mouth/Throat:      Mouth: Mucous membranes are moist.      Pharynx: Posterior oropharyngeal erythema present. No oropharyngeal exudate.   Neck:      Thyroid: No thyromegaly.   Cardiovascular:      Rate and Rhythm: Normal rate and regular rhythm.      Heart sounds: Normal heart sounds. No murmur heard.  Pulmonary:      Effort: Pulmonary effort is normal.      Breath sounds: No wheezing or rales.      Comments: Bronchovesicular breath sounds  Musculoskeletal:      Cervical back: Normal range of motion and neck supple.   Lymphadenopathy:      Cervical: No cervical adenopathy.   Skin:     General: Skin is warm and dry.      Findings: No rash.   Neurological:      Mental Status: He is alert and oriented to person, place, and time.   Psychiatric:         Mood and Affect: Mood normal.         " Behavior: Behavior normal.         Thought Content: Thought content normal.         The following data was reviewed by Tracee Santos PA-C:         Lab Results   Component Value Date    BUN 14 02/27/2023    CREATININE 1.15 02/27/2023    EGFR 80.5 02/27/2023    ALT 35 02/27/2023    AST 34 02/27/2023    WBC 7.79 02/27/2023    HGB 15.3 02/27/2023    HCT 46.4 02/27/2023     02/27/2023    CHOL 237 (H) 02/27/2023    TRIG 162 (H) 02/27/2023    HDL 46 02/27/2023     (H) 02/27/2023    TSH 1.410 06/02/2022           Assessment & Plan     Diagnoses and all orders for this visit:    1. Bronchitis (Primary)  -     doxycycline (VIBRAMYCIN) 100 MG capsule; Take 1 capsule by mouth 2 (Two) Times a Day for 10 days.  Dispense: 20 capsule; Refill: 0  -     tiotropium bromide monohydrate (Spiriva Respimat) 2.5 MCG/ACT aerosol solution inhaler; Inhale 2 puffs Daily.  Dispense: 4 g; Refill: 0        BMI is >= 30 and <35. (Class 1 Obesity). The following options were offered after discussion;: exercise counseling/recommendations and nutrition counseling/recommendations          Patient was given instructions and counseling regarding his condition or for health maintenance advice. Please see specific information pulled into the AVS if appropriate      This document has been electronically signed by:  Tracee Santos PA-C

## 2025-03-17 ENCOUNTER — TELEPHONE (OUTPATIENT)
Dept: FAMILY MEDICINE CLINIC | Facility: CLINIC | Age: 45
End: 2025-03-17

## 2025-03-17 NOTE — TELEPHONE ENCOUNTER
Caller: Nick Christian    Relationship: Self    Best call back number:      What form or medical record are you requesting: WORK NOTE    Who is requesting this form or medical record from you: PATIENT/WORK    How would you like to receive the form or medical records (pick-up, mail, fax):     If pick-up, provide patient with address and location details    Timeframe paperwork needed: ASAP PLEASE CALL WHEN READY FOR     Additional notes: PATIENT WAS SEEN ON FRIDAY AND NEEDS AN EXTENDED WORK NOTE  UNTIL WED [id number]

## 2025-03-18 ENCOUNTER — OFFICE VISIT (OUTPATIENT)
Dept: FAMILY MEDICINE CLINIC | Facility: CLINIC | Age: 45
End: 2025-03-18

## 2025-03-18 ENCOUNTER — TELEPHONE (OUTPATIENT)
Dept: FAMILY MEDICINE CLINIC | Facility: CLINIC | Age: 45
End: 2025-03-18

## 2025-03-18 VITALS
WEIGHT: 213.6 LBS | HEART RATE: 83 BPM | TEMPERATURE: 97.9 F | DIASTOLIC BLOOD PRESSURE: 72 MMHG | OXYGEN SATURATION: 99 % | BODY MASS INDEX: 31.64 KG/M2 | RESPIRATION RATE: 18 BRPM | HEIGHT: 69 IN | SYSTOLIC BLOOD PRESSURE: 118 MMHG

## 2025-03-18 DIAGNOSIS — J40 BRONCHITIS: Primary | ICD-10-CM

## 2025-03-18 PROCEDURE — 99213 OFFICE O/P EST LOW 20 MIN: CPT | Performed by: PHYSICIAN ASSISTANT

## 2025-03-18 RX ORDER — PREDNISONE 20 MG/1
40 TABLET ORAL DAILY
Qty: 10 TABLET | Refills: 0 | Status: SHIPPED | OUTPATIENT
Start: 2025-03-18

## 2025-03-18 NOTE — TELEPHONE ENCOUNTER
Caller: Nick Christian    Relationship: Self    Best call back number: [phone]     What is the best time to reach you: ANY    Who are you requesting to speak with (clinical staff, provider,  specific staff member): NURSE    Do you know the name of the person who called: PATIENT    What was the call regarding: PATIENT HAVING BRONCHIAL PNEUMONIA, WAS SEEN LAST FRIDAY.  NOW WANTS A Z PACK.     PHARMACY:  [facility]    Is it okay if the provider responds through MyChart: PHONE CALL PLEASE

## 2025-03-18 NOTE — PROGRESS NOTES
"Chief Complaint -cough    History of Present Illness -     Nick Christian is a 45 y.o. male.     Cough-  Patient complains of productive cough with sore throat, wheezing and fatigue.  Onset 9 days.  Some relief with doxycycline and Spiriva.  Patient has started using albuterol nebulizer treatments 3 times daily.  He has been unable to work due to wheezing and fatigue.      The following portions of the patient's history were reviewed and updated as appropriate: allergies, current medications, past family history, past medical history, past social history, past surgical history and problem list.        Objective  Vital signs:  /72 (BP Location: Left arm, Patient Position: Sitting, Cuff Size: Adult)   Pulse 83   Temp 97.9 °F (36.6 °C) (Temporal)   Resp 18   Ht 175.3 cm (69\")   Wt 96.9 kg (213 lb 9.6 oz)   SpO2 99%   BMI 31.54 kg/m²     Physical Exam  Vitals and nursing note reviewed.   Constitutional:       General: He is not in acute distress.     Appearance: Normal appearance. He is well-developed. He is not diaphoretic.   HENT:      Head: Normocephalic and atraumatic.      Right Ear: Tympanic membrane, ear canal and external ear normal.      Left Ear: Tympanic membrane, ear canal and external ear normal.      Nose: Nose normal.      Mouth/Throat:      Mouth: Mucous membranes are moist.      Pharynx: Posterior oropharyngeal erythema present. No oropharyngeal exudate.   Neck:      Thyroid: No thyromegaly.   Cardiovascular:      Rate and Rhythm: Normal rate and regular rhythm.      Heart sounds: Normal heart sounds. No murmur heard.  Pulmonary:      Effort: Pulmonary effort is normal.      Breath sounds: Wheezing present. No rales.   Musculoskeletal:      Cervical back: Normal range of motion and neck supple.   Lymphadenopathy:      Cervical: No cervical adenopathy.   Skin:     General: Skin is warm and dry.      Findings: No rash.   Neurological:      Mental Status: He is alert and oriented to person, " place, and time.   Psychiatric:         Mood and Affect: Mood normal.         Behavior: Behavior normal.         Thought Content: Thought content normal.         The following data was reviewed by Tracee Santos PA-C:         Lab Results   Component Value Date    BUN 14 02/27/2023    CREATININE 1.15 02/27/2023    EGFR 80.5 02/27/2023    ALT 35 02/27/2023    AST 34 02/27/2023    WBC 7.79 02/27/2023    HGB 15.3 02/27/2023    HCT 46.4 02/27/2023     02/27/2023    CHOL 237 (H) 02/27/2023    TRIG 162 (H) 02/27/2023    HDL 46 02/27/2023     (H) 02/27/2023    TSH 1.410 06/02/2022           Assessment & Plan     Diagnoses and all orders for this visit:    1. Bronchitis (Primary)  Comments:  Continue doxycycline until gone  Start prednisone  Advised symptomatic care including adequate hydration and rest  Orders:  -     predniSONE (DELTASONE) 20 MG tablet; Take 2 tablets by mouth Daily. 2 daily  Dispense: 10 tablet; Refill: 0                   Patient was given instructions and counseling regarding his condition or for health maintenance advice. Please see specific information pulled into the AVS if appropriate      This document has been electronically signed by:  Tracee Santos PA-C

## 2025-03-18 NOTE — LETTER
March 18, 2025     Patient: Nick Christian   YOB: 1980   Date of Visit: 3/18/2025       To Whom It May Concern:    Please excuse Nick Christian from work 3/14/25 through 3/19/25 due to medical illness.           Sincerely,        JÚNIOR Nicholas    CC: No Recipients

## 2025-04-22 DIAGNOSIS — E78.2 MIXED HYPERLIPIDEMIA: Chronic | ICD-10-CM

## 2025-04-22 RX ORDER — ROSUVASTATIN CALCIUM 10 MG/1
10 TABLET, COATED ORAL DAILY
Qty: 30 TABLET | Refills: 5 | Status: SHIPPED | OUTPATIENT
Start: 2025-04-22

## 2025-04-28 DIAGNOSIS — K21.00 GASTROESOPHAGEAL REFLUX DISEASE WITH ESOPHAGITIS WITHOUT HEMORRHAGE: Chronic | ICD-10-CM

## 2025-04-28 RX ORDER — ONDANSETRON 4 MG/1
4 TABLET, FILM COATED ORAL EVERY 8 HOURS PRN
Qty: 30 TABLET | Refills: 1 | Status: SHIPPED | OUTPATIENT
Start: 2025-04-28

## 2025-05-02 ENCOUNTER — LAB (OUTPATIENT)
Dept: FAMILY MEDICINE CLINIC | Facility: CLINIC | Age: 45
End: 2025-05-02
Payer: COMMERCIAL

## 2025-05-02 DIAGNOSIS — M91.10 JUVENILE OSTEOCHONDROSIS OF HEAD OF FEMUR, UNSPECIFIED LATERALITY: ICD-10-CM

## 2025-05-02 DIAGNOSIS — R73.03 PREDIABETES: ICD-10-CM

## 2025-05-02 DIAGNOSIS — Z86.79 HISTORY OF CHF (CONGESTIVE HEART FAILURE): Primary | ICD-10-CM

## 2025-05-02 DIAGNOSIS — M91.11 LEGG-PERTHES DISEASE, RIGHT: ICD-10-CM

## 2025-05-02 DIAGNOSIS — R11.2 NAUSEA AND VOMITING, UNSPECIFIED VOMITING TYPE: ICD-10-CM

## 2025-05-02 DIAGNOSIS — R40.0 SLEEPINESS: ICD-10-CM

## 2025-05-02 DIAGNOSIS — R73.9 HYPERGLYCEMIA: ICD-10-CM

## 2025-05-02 DIAGNOSIS — E06.3 HYPOTHYROIDISM DUE TO HASHIMOTO'S THYROIDITIS: ICD-10-CM

## 2025-05-02 DIAGNOSIS — N41.0 ACUTE PROSTATITIS: ICD-10-CM

## 2025-05-02 DIAGNOSIS — G47.33 OSA (OBSTRUCTIVE SLEEP APNEA): ICD-10-CM

## 2025-05-02 DIAGNOSIS — E66.9 OBESITY (BMI 30-39.9): ICD-10-CM

## 2025-05-02 DIAGNOSIS — G43.C0 PERIODIC HEADACHE SYNDROME, NOT INTRACTABLE: ICD-10-CM

## 2025-05-02 DIAGNOSIS — N45.1 EPIDIDYMITIS: ICD-10-CM

## 2025-05-02 DIAGNOSIS — Q07.00 ARNOLD-CHIARI MALFORMATION: ICD-10-CM

## 2025-05-02 DIAGNOSIS — E78.2 MIXED HYPERLIPIDEMIA: ICD-10-CM

## 2025-05-02 PROCEDURE — 83036 HEMOGLOBIN GLYCOSYLATED A1C: CPT | Performed by: PHYSICIAN ASSISTANT

## 2025-05-02 PROCEDURE — 80061 LIPID PANEL: CPT | Performed by: PHYSICIAN ASSISTANT

## 2025-05-02 PROCEDURE — 80053 COMPREHEN METABOLIC PANEL: CPT | Performed by: PHYSICIAN ASSISTANT

## 2025-05-02 PROCEDURE — 85025 COMPLETE CBC W/AUTO DIFF WBC: CPT | Performed by: PHYSICIAN ASSISTANT

## 2025-05-02 PROCEDURE — 82306 VITAMIN D 25 HYDROXY: CPT | Performed by: PHYSICIAN ASSISTANT

## 2025-05-02 PROCEDURE — 84443 ASSAY THYROID STIM HORMONE: CPT | Performed by: PHYSICIAN ASSISTANT

## 2025-05-02 PROCEDURE — 36415 COLL VENOUS BLD VENIPUNCTURE: CPT | Performed by: PHYSICIAN ASSISTANT

## 2025-05-02 PROCEDURE — 82607 VITAMIN B-12: CPT | Performed by: PHYSICIAN ASSISTANT

## 2025-05-03 LAB
25(OH)D3 SERPL-MCNC: 30.3 NG/ML (ref 30–100)
ALBUMIN SERPL-MCNC: 4.6 G/DL (ref 3.5–5.2)
ALBUMIN/GLOB SERPL: 1.7 G/DL
ALP SERPL-CCNC: 60 U/L (ref 39–117)
ALT SERPL W P-5'-P-CCNC: 25 U/L (ref 1–41)
ANION GAP SERPL CALCULATED.3IONS-SCNC: 12.4 MMOL/L (ref 5–15)
AST SERPL-CCNC: 32 U/L (ref 1–40)
BASOPHILS # BLD AUTO: 0.07 10*3/MM3 (ref 0–0.2)
BASOPHILS NFR BLD AUTO: 0.9 % (ref 0–1.5)
BILIRUB SERPL-MCNC: 0.4 MG/DL (ref 0–1.2)
BUN SERPL-MCNC: 18 MG/DL (ref 6–20)
BUN/CREAT SERPL: 15.9 (ref 7–25)
CALCIUM SPEC-SCNC: 9.2 MG/DL (ref 8.6–10.5)
CHLORIDE SERPL-SCNC: 103 MMOL/L (ref 98–107)
CHOLEST SERPL-MCNC: 138 MG/DL (ref 0–200)
CO2 SERPL-SCNC: 23.6 MMOL/L (ref 22–29)
CREAT SERPL-MCNC: 1.13 MG/DL (ref 0.76–1.27)
DEPRECATED RDW RBC AUTO: 45.3 FL (ref 37–54)
EGFRCR SERPLBLD CKD-EPI 2021: 81.2 ML/MIN/1.73
EOSINOPHIL # BLD AUTO: 0.27 10*3/MM3 (ref 0–0.4)
EOSINOPHIL NFR BLD AUTO: 3.5 % (ref 0.3–6.2)
ERYTHROCYTE [DISTWIDTH] IN BLOOD BY AUTOMATED COUNT: 14.2 % (ref 12.3–15.4)
GLOBULIN UR ELPH-MCNC: 2.7 GM/DL
GLUCOSE SERPL-MCNC: 99 MG/DL (ref 65–99)
HBA1C MFR BLD: 6.3 % (ref 4.8–5.6)
HCT VFR BLD AUTO: 45.8 % (ref 37.5–51)
HDLC SERPL-MCNC: 33 MG/DL (ref 40–60)
HGB BLD-MCNC: 14.8 G/DL (ref 13–17.7)
IMM GRANULOCYTES # BLD AUTO: 0.06 10*3/MM3 (ref 0–0.05)
IMM GRANULOCYTES NFR BLD AUTO: 0.8 % (ref 0–0.5)
LDLC SERPL CALC-MCNC: 73 MG/DL (ref 0–100)
LDLC/HDLC SERPL: 2.04 {RATIO}
LYMPHOCYTES # BLD AUTO: 2.75 10*3/MM3 (ref 0.7–3.1)
LYMPHOCYTES NFR BLD AUTO: 36 % (ref 19.6–45.3)
MCH RBC QN AUTO: 28.5 PG (ref 26.6–33)
MCHC RBC AUTO-ENTMCNC: 32.3 G/DL (ref 31.5–35.7)
MCV RBC AUTO: 88.2 FL (ref 79–97)
MONOCYTES # BLD AUTO: 0.66 10*3/MM3 (ref 0.1–0.9)
MONOCYTES NFR BLD AUTO: 8.7 % (ref 5–12)
NEUTROPHILS NFR BLD AUTO: 3.82 10*3/MM3 (ref 1.7–7)
NEUTROPHILS NFR BLD AUTO: 50.1 % (ref 42.7–76)
NRBC BLD AUTO-RTO: 0 /100 WBC (ref 0–0.2)
PLATELET # BLD AUTO: 353 10*3/MM3 (ref 140–450)
PMV BLD AUTO: 9 FL (ref 6–12)
POTASSIUM SERPL-SCNC: 4.6 MMOL/L (ref 3.5–5.2)
PROT SERPL-MCNC: 7.3 G/DL (ref 6–8.5)
RBC # BLD AUTO: 5.19 10*6/MM3 (ref 4.14–5.8)
SODIUM SERPL-SCNC: 139 MMOL/L (ref 136–145)
TRIGL SERPL-MCNC: 189 MG/DL (ref 0–150)
TSH SERPL DL<=0.05 MIU/L-ACNC: 15 UIU/ML (ref 0.27–4.2)
VIT B12 BLD-MCNC: 755 PG/ML (ref 211–946)
VLDLC SERPL-MCNC: 32 MG/DL (ref 5–40)
WBC NRBC COR # BLD AUTO: 7.63 10*3/MM3 (ref 3.4–10.8)

## 2025-05-09 ENCOUNTER — OFFICE VISIT (OUTPATIENT)
Dept: FAMILY MEDICINE CLINIC | Facility: CLINIC | Age: 45
End: 2025-05-09
Payer: COMMERCIAL

## 2025-05-09 VITALS
WEIGHT: 219 LBS | HEIGHT: 69 IN | DIASTOLIC BLOOD PRESSURE: 86 MMHG | BODY MASS INDEX: 32.44 KG/M2 | HEART RATE: 86 BPM | TEMPERATURE: 98 F | SYSTOLIC BLOOD PRESSURE: 128 MMHG | OXYGEN SATURATION: 98 %

## 2025-05-09 DIAGNOSIS — Z12.11 SCREENING FOR COLON CANCER: ICD-10-CM

## 2025-05-09 DIAGNOSIS — R73.9 HYPERGLYCEMIA: ICD-10-CM

## 2025-05-09 DIAGNOSIS — G47.33 OSA (OBSTRUCTIVE SLEEP APNEA): ICD-10-CM

## 2025-05-09 DIAGNOSIS — E06.3 HYPOTHYROIDISM DUE TO HASHIMOTO'S THYROIDITIS: Primary | Chronic | ICD-10-CM

## 2025-05-09 DIAGNOSIS — Q07.00 ARNOLD-CHIARI MALFORMATION: ICD-10-CM

## 2025-05-09 DIAGNOSIS — R73.03 PREDIABETES: Chronic | ICD-10-CM

## 2025-05-09 DIAGNOSIS — G43.109 MIGRAINE WITH AURA AND WITHOUT STATUS MIGRAINOSUS, NOT INTRACTABLE: Chronic | ICD-10-CM

## 2025-05-09 RX ORDER — LEVOTHYROXINE SODIUM 150 UG/1
150 TABLET ORAL
Qty: 90 TABLET | Refills: 1 | Status: SHIPPED | OUTPATIENT
Start: 2025-05-09

## 2025-05-09 RX ORDER — RIZATRIPTAN BENZOATE 10 MG/1
10 TABLET ORAL ONCE AS NEEDED
Qty: 12 TABLET | Refills: 5 | Status: SHIPPED | OUTPATIENT
Start: 2025-05-09

## 2025-05-09 NOTE — PATIENT INSTRUCTIONS
Carbohydrate Counting for Diabetes Mellitus, Adult  Carbohydrate counting is a method of keeping track of how many carbohydrates you eat. Eating carbohydrates increases the amount of sugar (glucose) in the blood. Counting how many carbohydrates you eat improves how well you manage your blood glucose. This, in turn, helps you manage your diabetes.  Carbohydrates are measured in grams (g) per serving. It is important to know how many carbohydrates (in grams or by serving size) you can have in each meal. This is different for every person. A dietitian can help you make a meal plan and calculate how many carbohydrates you should have at each meal and snack.  What foods contain carbohydrates?  Carbohydrates are found in the following foods:  Grains, such as breads and cereals.  Dried beans and soy products.  Starchy vegetables, such as potatoes, peas, and corn.  Fruit and fruit juices.  Milk and yogurt.  Sweets and snack foods, such as cake, cookies, candy, chips, and soft drinks.  How do I count carbohydrates in foods?  There are two ways to count carbohydrates in food. You can read food labels or learn standard serving sizes of foods. You can use either of these methods or a combination of both.  Using the Nutrition Facts label  The Nutrition Facts list is included on the labels of almost all packaged foods and beverages in the [place]. It includes:  The serving size.  Information about nutrients in each serving, including the grams of carbohydrate per serving.  To use the Nutrition Facts, decide how many servings you will have. Then, multiply the number of servings by the number of carbohydrates per serving. The resulting number is the total grams of carbohydrates that you will be having.  Learning the standard serving sizes of foods  When you eat carbohydrate foods that are not packaged or do not include Nutrition Facts on the label, you need to measure the servings in order to count the grams of  carbohydrates.  Measure the foods that you will eat with a food scale or measuring cup, if needed.  Decide how many standard-size servings you will eat.  Multiply the number of servings by 15. For foods that contain carbohydrates, one serving equals 15 g of carbohydrates.  For example, if you eat 2 cups or 10 oz (300 g) of strawberries, you will have eaten 2 servings and 30 g of carbohydrates (2 servings x 15 g = 30 g).  For foods that have more than one food mixed, such as soups and casseroles, you must count the carbohydrates in each food that is included.  The following list contains standard serving sizes of common carbohydrate-rich foods. Each of these servings has about 15 g of carbohydrates:  1 slice of bread.  1 six-inch (15 cm) tortilla.  ? cup or 2 oz (53 g) cooked rice or pasta.  ½ cup or 3 oz (85 g) cooked or canned, drained and rinsed beans or lentils.  ½ cup or 3 oz (85 g) starchy vegetable, such as peas, corn, or squash.  ½ cup or 4 oz (120 g) hot cereal.  ½ cup or 3 oz (85 g) boiled or mashed potatoes, or ¼ or 3 oz (85 g) of a large baked potato.  ½ cup or 4 fl oz (118 mL) fruit juice.  1 cup or 8 fl oz (237 mL) milk.  1 small or 4 oz (106 g) apple.  ½ or 2 oz (63 g) of a medium banana.  1 cup or 5 oz (150 g) strawberries.  3 cups or 1 oz (28.3 g) popped popcorn.  What is an example of carbohydrate counting?  To calculate the grams of carbohydrates in this sample meal, follow the steps shown below.  Sample meal  3 oz (85 g) chicken breast.  ? cup or 4 oz (106 g) brown rice.  ½ cup or 3 oz (85 g) corn.  1 cup or 8 fl oz (237 mL) milk.  1 cup or 5 oz (150 g) strawberries with sugar-free whipped topping.  Carbohydrate calculation  Identify the foods that contain carbohydrates:  Rice.  Corn.  Milk.  Strawberries.  Calculate how many servings you have of each food:  2 servings rice.  1 serving corn.  1 serving milk.  1 serving strawberries.  Multiply each number of servings by 15  servings rice x 15  g = 30 g.  1 serving corn x 15 g = 15 g.  1 serving milk x 15 g = 15 g.  1 serving strawberries x 15 g = 15 g.  Add together all of the amounts to find the total grams of carbohydrates eaten:  30 g + 15 g + 15 g + 15 g = 75 g of carbohydrates total.  What are tips for following this plan?  Shopping  Develop a meal plan and then make a shopping list.  Buy fresh and frozen vegetables, fresh and frozen fruit, dairy, eggs, beans, lentils, and whole grains.  Look at food labels. Choose foods that have more fiber and less sugar.  Avoid processed foods and foods with added sugars.  Meal planning  Aim to have the same number of grams of carbohydrates at each meal and for each snack time.  Plan to have regular, balanced meals and snacks.  Where to find more information  American Diabetes Association: diabetes.org  Centers for Disease Control and Prevention: cdc.gov  Academy of Nutrition and Dietetics: eatright.org  Association of Diabetes Care & Education Specialists: diabeteseducator.org  Summary  Carbohydrate counting is a method of keeping track of how many carbohydrates you eat.  Eating carbohydrates increases the amount of sugar (glucose) in your blood.  Counting how many carbohydrates you eat improves how well you manage your blood glucose. This helps you manage your diabetes.  A dietitian can help you make a meal plan and calculate how many carbohydrates you should have at each meal and snack.  This information is not intended to replace advice given to you by your health care provider. Make sure you discuss any questions you have with your health care provider.  Document Revised: 07/20/2021 Document Reviewed: 07/21/2021  Elsevier Patient Education © 2024 [employer]

## 2025-05-09 NOTE — PROGRESS NOTES
"Chief Complaint -hypothyroidism    History of Present Illness -     Nick Christian is a 45 y.o. male.     Hypothyroidism-  Not at goal as recent lab work shows underactive thyroid levels.  Patient currently taking levothyroxine 125 mcg daily.  He does complain of significant fatigue and inability to lose weight despite significant diet and exercise efforts.    Hyperglycemia-  We discussed lab work showing hemoglobin A1c of 6.3 which is consistent with prediabetes    Migraine-  Patient complains of migraine headaches.  He does report associated phonophobia and photophobia.  He reports visual changes/prodrome.  Pain is described as intermittent moderate throbbing with associated nausea.  He does have history of Arnold Chiari malformation.  He was seen by Dr. Be, neurology, in the past and would like to go back to see him.    Obstructive sleep apnea-  Not at goal.  Patient reports that he stopped using his sleep apnea machine and sent it back in the past for undisclosed reasons.  He is interested in seeing sleep medicine/pulmonology specialist for further evaluation of this issue      The following portions of the patient's history were reviewed and updated as appropriate: allergies, current medications, past family history, past medical history, past social history, past surgical history and problem list.        Objective  Vital signs:  /86 (BP Location: Right arm, Patient Position: Sitting, Cuff Size: Adult)   Pulse 86   Temp 98 °F (36.7 °C) (Temporal)   Ht 175.3 cm (69\")   Wt 99.3 kg (219 lb)   SpO2 98%   BMI 32.34 kg/m²     Physical Exam  Vitals and nursing note reviewed.   Constitutional:       Appearance: Normal appearance. He is well-developed. He is obese.   Eyes:      Extraocular Movements: Extraocular movements intact.      Conjunctiva/sclera: Conjunctivae normal.   Cardiovascular:      Rate and Rhythm: Normal rate and regular rhythm.      Heart sounds: Normal heart sounds. No murmur " heard.  Pulmonary:      Effort: Pulmonary effort is normal. No respiratory distress.      Breath sounds: Normal breath sounds. No wheezing.   Musculoskeletal:         General: No tenderness.   Skin:     General: Skin is warm and dry.      Findings: No rash.   Neurological:      Mental Status: He is alert and oriented to person, place, and time.   Psychiatric:         Mood and Affect: Mood normal.         Behavior: Behavior normal.         Thought Content: Thought content normal.         The following data was reviewed by Tracee Santos PA-C:         Lab Results   Component Value Date    BUN 18 05/02/2025    CREATININE 1.13 05/02/2025    EGFR 81.2 05/02/2025    ALT 25 05/02/2025    AST 32 05/02/2025    WBC 7.63 05/02/2025    HGB 14.8 05/02/2025    HCT 45.8 05/02/2025     05/02/2025    CHOL 138 05/02/2025    TRIG 189 (H) 05/02/2025    HDL 33 (L) 05/02/2025    LDL 73 05/02/2025    TSH 15.000 (H) 05/02/2025    HGBA1C 6.30 (H) 05/02/2025           Assessment & Plan     Diagnoses and all orders for this visit:    1. Hypothyroidism due to Hashimoto's thyroiditis (Primary)  Comments:  Increase Synthroid 150 mcg daily  Ordered ultrasound of thyroid and refer endocrinology  Orders:  -     Ambulatory Referral to Endocrinology  -     levothyroxine (Synthroid) 150 MCG tablet; Take 1 tablet by mouth Every Morning.  Dispense: 90 tablet; Refill: 1  -     TSH; Future  -     T4, Free; Future  -     T3, Free; Future  -     US Thyroid; Future    2. Hyperglycemia  -     Hemoglobin A1c; Future    3. Prediabetes  Comments:  Advised a low carbohydrate diabetic diet  Orders:  -     Hemoglobin A1c; Future    4. Screening for colon cancer  -     Cologuard - Stool, Per Rectum; Future    5. Migraine with aura and without status migrainosus, not intractable  Comments:  Start Maxalt for breakthrough migraines  Orders:  -     Ambulatory Referral to Neurology  -     rizatriptan (Maxalt) 10 MG tablet; Take 1 tablet by mouth 1 (One) Time As  Needed for Migraine. May repeat in 2 hours if needed  Dispense: 12 tablet; Refill: 5    6. JEMMA (obstructive sleep apnea)  -     Ambulatory Referral to Sleep Medicine    7. Arnold-Chiari malformation  -     Ambulatory Referral to Sleep Medicine                   Patient was given instructions and counseling regarding his condition or for health maintenance advice. Please see specific information pulled into the AVS if appropriate      This document has been electronically signed by:  Tracee Santos PA-C

## 2025-05-29 ENCOUNTER — TELEPHONE (OUTPATIENT)
Dept: FAMILY MEDICINE CLINIC | Facility: CLINIC | Age: 45
End: 2025-05-29

## 2025-05-29 ENCOUNTER — HOSPITAL ENCOUNTER (OUTPATIENT)
Facility: HOSPITAL | Age: 45
Discharge: HOME | End: 2025-05-29
Admitting: PHYSICIAN ASSISTANT
Payer: COMMERCIAL

## 2025-05-29 DIAGNOSIS — E06.3 HYPOTHYROIDISM DUE TO HASHIMOTO'S THYROIDITIS: Chronic | ICD-10-CM

## 2025-05-29 PROCEDURE — 76536 US EXAM OF HEAD AND NECK: CPT | Performed by: RADIOLOGY

## 2025-05-29 PROCEDURE — 76536 US EXAM OF HEAD AND NECK: CPT

## 2025-05-29 NOTE — TELEPHONE ENCOUNTER
Caller: Nick Christian    Relationship: Self    Best call back number: [phone]    What is the medical concern/diagnosis:     BREATHING   ACID REFLUX  WHEEZING    What specialty or service is being requested:     PULMONOLOGIST    Any additional details:     PLEASE CALL PATIENT WITH APPOINTMENT DETAILS

## 2025-05-29 NOTE — TELEPHONE ENCOUNTER
I did refer him to pulmonology in [place].  The pulmonologist is the one that we will do the sleep study.  He can discussed his other symptoms with the pulmonologist at that time.

## 2025-05-29 NOTE — TELEPHONE ENCOUNTER
Name: Nick Christian    Relationship: Self    Best Callback Number: [phone]     HUB PROVIDED THE RELAY MESSAGE FROM THE OFFICE   PATIENT VOICED UNDERSTANDING AND HAS NO FURTHER QUESTIONS AT THIS TIME    ADDITIONAL INFORMATION:

## 2025-06-02 ENCOUNTER — TELEPHONE (OUTPATIENT)
Dept: FAMILY MEDICINE CLINIC | Facility: CLINIC | Age: 45
End: 2025-06-02
Payer: COMMERCIAL

## 2025-06-02 DIAGNOSIS — E04.1 THYROID NODULE: Primary | ICD-10-CM

## 2025-06-02 NOTE — TELEPHONE ENCOUNTER
Patient returned phone call, Spoke to patient concerning US results, patient is aware and understanding

## 2025-06-02 NOTE — TELEPHONE ENCOUNTER
Caller: Nick Christian    Relationship: Self    Best call back number: [phone]       What test was performed: U/S THYROID    When was the test performed: 05.30.25    [facility]    Additional notes:

## 2025-06-02 NOTE — TELEPHONE ENCOUNTER
Unable to reach patient concerning US of thyroid results, Left patient a voicemail to call the office back.    HUB/RELAY

## 2025-06-09 ENCOUNTER — OFFICE VISIT (OUTPATIENT)
Dept: SURGERY | Facility: CLINIC | Age: 45
End: 2025-06-09
Payer: COMMERCIAL

## 2025-06-09 ENCOUNTER — TELEPHONE (OUTPATIENT)
Dept: FAMILY MEDICINE CLINIC | Facility: CLINIC | Age: 45
End: 2025-06-09

## 2025-06-09 VITALS — HEIGHT: 69 IN | WEIGHT: 200 LBS | BODY MASS INDEX: 29.62 KG/M2

## 2025-06-09 DIAGNOSIS — E04.1 THYROID NODULE: Primary | ICD-10-CM

## 2025-06-09 DIAGNOSIS — E78.2 MIXED HYPERLIPIDEMIA: ICD-10-CM

## 2025-06-09 DIAGNOSIS — Z86.79 HISTORY OF CHF (CONGESTIVE HEART FAILURE): Primary | ICD-10-CM

## 2025-06-09 PROCEDURE — 1160F RVW MEDS BY RX/DR IN RCRD: CPT | Performed by: SURGERY

## 2025-06-09 PROCEDURE — 1159F MED LIST DOCD IN RCRD: CPT | Performed by: SURGERY

## 2025-06-09 PROCEDURE — 99203 OFFICE O/P NEW LOW 30 MIN: CPT | Performed by: SURGERY

## 2025-06-09 NOTE — TELEPHONE ENCOUNTER
Caller: Nick Christian    Relationship: Self    Best call back number: [phone]     What is the medical concern/diagnosis: HISTORICAL HEART ATTACK AND STROKE    What specialty or service is being requested: CARDIOLOGY    What is the provider, practice or medical service name: [facility] CARDIOLOGY     Any additional details: PATIENT DOES NOT WANT TO BE SEEN BY THE PREVIOUS CARDIOLOGIST AND WOULD LIKE TO STAY WITHIN [facility] IF POSSIBLE.

## 2025-06-09 NOTE — PROGRESS NOTES
Siomara Christian is a 45 y.o. male.     Chief Complaint: Thyroid nodule    History of Present Illness He is a 46 yo who had some hypothyroidism and a had a US ordered that showed a 1.4 x 1.8 cm right thyroid nodule. He does have sinus drainage and at times feels tight in his chest.      The following portions of the patient's history were reviewed and updated as appropriate: current medications, past family history, past medical history, past social history, past surgical history and problem list.    Review of Systems    Objective   Physical Exam I cannot feel the nodule in the neck. No other masses or adenopathy palpable.    Past Medical History:   Diagnosis Date    Arnold-Chiari deformity     Arrhythmia     Arthritis     Coronary artery disease     Perthes disease     Sleep apnea     Stroke     Thyroid disease        Family History   Problem Relation Age of Onset    Arthritis Mother     Rheumatologic disease Mother     Diabetes Mother     Hypertension Mother     Obesity Mother     Asthma Mother     Diabetes Father     Cancer Father         PANCREATIC    Sleep apnea Father     Hypersomnolence Father     Asthma Father     Arthritis Father     Cancer Paternal Aunt     Hypertension Maternal Grandmother     Heart disease Maternal Grandfather     Diabetes Maternal Grandfather     Thyroid disease Paternal Grandmother     Heart disease Paternal Grandfather     Stroke Paternal Grandfather        Social History     Tobacco Use    Smoking status: Former     Current packs/day: 0.00     Average packs/day: 1 pack/day for 23.0 years (23.0 ttl pk-yrs)     Types: Cigarettes     Start date: 1992     Quit date: 2015     Years since quitting: 10.4    Smokeless tobacco: Former     Types: Chew   Vaping Use    Vaping status: Never Used   Substance Use Topics    Alcohol use: Yes     Comment: occas    Drug use: No       Past Surgical History:   Procedure Laterality Date    GALLBLADDER SURGERY      HERNIA REPAIR      HIP BIPOLAR  REPLACEMENT  2019       Current Outpatient Medications   Medication Instructions    levothyroxine (SYNTHROID) 150 mcg, Oral, Every Early Morning    ondansetron (ZOFRAN) 4 mg, Oral, Every 8 Hours PRN    pantoprazole (PROTONIX) 40 mg, Oral, Daily    predniSONE (DELTASONE) 40 mg, Oral, Daily, 2 daily    rizatriptan (MAXALT) 10 mg, Oral, Once As Needed, May repeat in 2 hours if needed    rosuvastatin (CRESTOR) 10 mg, Oral, Daily    sucralfate (CARAFATE) 1 g tablet TAKE 1 TABLET BY MOUTH FOUR TIMES DAILY    tiotropium bromide monohydrate (Spiriva Respimat) 2.5 MCG/ACT aerosol solution inhaler 2 puffs, Inhalation, Daily - RT    triamcinolone (KENALOG) 0.5 % cream 1 application , Topical, 3 Times Daily         Assessment & Plan   Diagnoses and all orders for this visit:    1. Thyroid nodule (Primary)    US guided FNA of the thyroid  I do not think the chest/breathing issues are related to the thyroid, and he may need a CXR or CT for that.           This document has been electronically signed by Jenaro Lazaro MD   June 9, 2025 09:35 EDT

## 2025-06-16 ENCOUNTER — TELEPHONE (OUTPATIENT)
Dept: FAMILY MEDICINE CLINIC | Facility: CLINIC | Age: 45
End: 2025-06-16

## 2025-06-16 DIAGNOSIS — E04.1 THYROID NODULE: Primary | ICD-10-CM

## 2025-06-16 NOTE — TELEPHONE ENCOUNTER
Caller: Nick Christian    Relationship: Self    Best call back number: [phone]     What is the medical concern/diagnosis: THYROID BIOPSY    What specialty or service is being requested: ENDOCRINOLOGY    What is the provider, practice or medical service name: ANY    What is the office location: [facility]    What is the office phone number:     Any additional details:

## 2025-06-16 NOTE — TELEPHONE ENCOUNTER
I called and spoke to Nick. He said that he didn't feel comfortable with [place] and wanted to see someone else in [place].

## 2025-06-16 NOTE — TELEPHONE ENCOUNTER
It looks like Dr. Daly or general surgery in [place] has already ordered the ultrasound-guided biopsy.  Please call the patient for clarification and schedule in [place] per patient request.

## 2025-06-23 ENCOUNTER — TELEPHONE (OUTPATIENT)
Dept: FAMILY MEDICINE CLINIC | Facility: CLINIC | Age: 45
End: 2025-06-23

## 2025-06-23 ENCOUNTER — HOSPITAL ENCOUNTER (OUTPATIENT)
Dept: ULTRASOUND IMAGING | Facility: HOSPITAL | Age: 45
Discharge: HOME | End: 2025-06-23
Admitting: RADIOLOGY
Payer: COMMERCIAL

## 2025-06-23 VITALS
HEART RATE: 98 BPM | DIASTOLIC BLOOD PRESSURE: 83 MMHG | OXYGEN SATURATION: 98 % | SYSTOLIC BLOOD PRESSURE: 137 MMHG | RESPIRATION RATE: 16 BRPM

## 2025-06-23 DIAGNOSIS — E04.1 THYROID NODULE: ICD-10-CM

## 2025-06-23 PROCEDURE — 60100 BIOPSY OF THYROID: CPT | Performed by: RADIOLOGY

## 2025-06-23 PROCEDURE — 76536 US EXAM OF HEAD AND NECK: CPT

## 2025-06-23 PROCEDURE — 76536 US EXAM OF HEAD AND NECK: CPT | Performed by: RADIOLOGY

## 2025-06-23 NOTE — TELEPHONE ENCOUNTER
Looks like the patient is scheduled with saleem granados on 6/24/2025 8:00 AM. I talked to him and he said that he would like to go to [facility] instead. I fixed his referral for that. Will you look at the second part of the message.       Hub to read/relay!

## 2025-06-23 NOTE — TELEPHONE ENCOUNTER
Caller: Anahi Nick    Relationship: Self    Best call back number: [phone]     What is the best time to reach you: ANY    Who are you requesting to speak with (clinical staff, provider,  specific staff member): NURSE    Do you know the name of the person who called: PATIENT    What was the call regarding: PATIENT WOULD LIKE TO KNOW IF WE GOT HIM A [facility] DOCTOR FOR THYROID NODULE.  THE ONE HE SAW TODAY DID NOT DO BIOPSY.      HE WOULD LIKE ANOTHER CARDIOLOGIST OTHER THAN DR TOPETE.  OKAY IF THIS IS [facility] AS WELL.      Is it okay if the provider responds through MyChart: PHONE CALL PLEASE

## 2025-06-24 ENCOUNTER — OFFICE VISIT (OUTPATIENT)
Dept: ENDOCRINOLOGY | Facility: CLINIC | Age: 45
End: 2025-06-24
Payer: COMMERCIAL

## 2025-06-24 VITALS
BODY MASS INDEX: 31.75 KG/M2 | HEART RATE: 78 BPM | DIASTOLIC BLOOD PRESSURE: 81 MMHG | OXYGEN SATURATION: 97 % | SYSTOLIC BLOOD PRESSURE: 130 MMHG | WEIGHT: 215 LBS

## 2025-06-24 DIAGNOSIS — R13.10 DYSPHAGIA, UNSPECIFIED TYPE: ICD-10-CM

## 2025-06-24 DIAGNOSIS — N52.9 ERECTILE DYSFUNCTION, UNSPECIFIED ERECTILE DYSFUNCTION TYPE: ICD-10-CM

## 2025-06-24 DIAGNOSIS — E06.3 HYPOTHYROIDISM DUE TO HASHIMOTO THYROIDITIS: Primary | ICD-10-CM

## 2025-06-24 LAB
ESTRADIOL SERPL HS-MCNC: 35.8 PG/ML
FSH SERPL-ACNC: 4.45 MIU/ML
LH SERPL-ACNC: 3.76 MIU/ML
PROLACTIN SERPL-MCNC: 10.8 NG/ML (ref 4.04–15.2)
PSA SERPL-MCNC: 0.85 NG/ML (ref 0–4)
T3FREE SERPL-MCNC: 4.56 PG/ML (ref 2–4.4)
T4 FREE SERPL-MCNC: 1.1 NG/DL (ref 0.92–1.68)
TSH SERPL DL<=0.05 MIU/L-ACNC: 0.47 UIU/ML (ref 0.27–4.2)

## 2025-06-24 PROCEDURE — 86800 THYROGLOBULIN ANTIBODY: CPT | Performed by: NURSE PRACTITIONER

## 2025-06-24 PROCEDURE — 84445 ASSAY OF TSI GLOBULIN: CPT | Performed by: NURSE PRACTITIONER

## 2025-06-24 PROCEDURE — 84481 FREE ASSAY (FT-3): CPT | Performed by: NURSE PRACTITIONER

## 2025-06-24 PROCEDURE — 83002 ASSAY OF GONADOTROPIN (LH): CPT | Performed by: NURSE PRACTITIONER

## 2025-06-24 PROCEDURE — 83520 IMMUNOASSAY QUANT NOS NONAB: CPT | Performed by: NURSE PRACTITIONER

## 2025-06-24 PROCEDURE — 83001 ASSAY OF GONADOTROPIN (FSH): CPT | Performed by: NURSE PRACTITIONER

## 2025-06-24 PROCEDURE — 84153 ASSAY OF PSA TOTAL: CPT | Performed by: NURSE PRACTITIONER

## 2025-06-24 PROCEDURE — 99214 OFFICE O/P EST MOD 30 MIN: CPT | Performed by: NURSE PRACTITIONER

## 2025-06-24 PROCEDURE — 84146 ASSAY OF PROLACTIN: CPT | Performed by: NURSE PRACTITIONER

## 2025-06-24 PROCEDURE — 86376 MICROSOMAL ANTIBODY EACH: CPT | Performed by: NURSE PRACTITIONER

## 2025-06-24 PROCEDURE — 84439 ASSAY OF FREE THYROXINE: CPT | Performed by: NURSE PRACTITIONER

## 2025-06-24 PROCEDURE — 1159F MED LIST DOCD IN RCRD: CPT | Performed by: NURSE PRACTITIONER

## 2025-06-24 PROCEDURE — 82670 ASSAY OF TOTAL ESTRADIOL: CPT | Performed by: NURSE PRACTITIONER

## 2025-06-24 PROCEDURE — 84403 ASSAY OF TOTAL TESTOSTERONE: CPT | Performed by: NURSE PRACTITIONER

## 2025-06-24 PROCEDURE — 84443 ASSAY THYROID STIM HORMONE: CPT | Performed by: NURSE PRACTITIONER

## 2025-06-24 PROCEDURE — 1160F RVW MEDS BY RX/DR IN RCRD: CPT | Performed by: NURSE PRACTITIONER

## 2025-06-24 PROCEDURE — 84402 ASSAY OF FREE TESTOSTERONE: CPT | Performed by: NURSE PRACTITIONER

## 2025-06-24 NOTE — ASSESSMENT & PLAN NOTE
- Clinically hypothyroid.  -TFTs today with medication adjustment as indicated.  -Reminded of proper administration including taking 7 pills, once daily, per week on an empty stomach with no missed doses, waiting 30-60 minutes prior to other medications or food.  -Discussed adding on T3.  Will do this based on lab results from today.   -Follow up in 3 months.

## 2025-06-24 NOTE — ASSESSMENT & PLAN NOTE
With patient's increased symptoms, will obtain CT Chest to determine if thyroid gland is behind the sternal bone.  Will treat and follow based upon results.

## 2025-06-24 NOTE — PROGRESS NOTES
Chief Complaint   Patient presents with    Thyroid Problem     New patient        Referring Provider  Tracee Santos PA HPI   Nick Christian is a 45 y.o. male had concerns including Thyroid Problem (New patient).   Seen as a new patient.  Hypothyroidism.    In 2008, he got sick with high fever and ended up having a MI and CVA. He reports that at that time he was noted to have hyperthyroidism and started seeing endocrinology at that time.  He was noted to have a nodule and had an Uptake and Scan, but is not sure what the outcome of that testing was.  He reports that he has not followed with his thyroid management well.  He has been having increased symptoms.  He saw PCP and they changed his medication and sent him here for further evaluation.  He is currently taking T4 150 mcg QD. He is taking this regularly without missing doses.  He is not taking any conflicting medication at this time.  He is having some compressive symptoms and dysphagia and increased symptoms when he lifts his arms and feels like his breathing is affected as well.   He has Arnold-Chiari Malformation. He is a twin and was born prematurely.     US Thyroid: 05/29/2025  FINDINGS:    Left thyroid lobe:  Unremarkable as visualized.  No enlarged or calcified nodules.    Right thyroid lobe:  1.8 x 1.4 right lobe of thyroid solid nodule.    Isthmus:  Unremarkable as visualized.  No enlarged or calcified nodules.    Lymph nodes:  Unremarkable as visualized.  No lymphadenopathy.   IMPRESSION:    1.8 x 1.4 right lobe of thyroid solid nodule.    US Thyroid: 06/23/2025  FINDINGS:  Heterogeneous hypoechoic thyroid echotexture with overall somewhat diminutive appearance of the gland compatible with later stage changes of thyroiditis. There are areas of heterogeneity noted throughout the left lobe but there are no discrete thyroid nodules identified. Bandlike fibrous tissue that is somewhat echogenic in the right lobe accounts for pseudo nodule appearance  on previous exam. Assessment of the thyroid  gland during real-time scanning reveals no suspicious thyroid nodules. As such, no biopsy performed. Size of the thyroid gland is stable. Prominent reactive-appearing right jugular chain lymph nodes are noted but do not meet size criteria for adenopathy. Clinical follow-up recommended.  IMPRESSION:  1. Heterogeneous hypoechoic and somewhat small size of thyroid gland compatible with relatively later states changes of thyroiditis.  2. Lobulated appearance of the gland with areas of heterogeneity but no discrete nodule or mass identified. No biopsy performed.  3. Reactive appearing right jugular chain lymph nodes. No adenopathy. Clinical follow-up recommended.  4. Consider follow-up thyroid ultrasound in 6-12 months.    Birth state: [place]  Previous history of radiation to face/neck: none  Consuming foods high in iodine:none  Family history of thyroid complications: none    The following portions of the patient's history were reviewed and updated as appropriate: allergies, current medications, past family history, past medical history, past social history, past surgical history, and problem list.    Past Medical History:   Diagnosis Date    Allergic 5    Arnold-Chiari deformity     Arrhythmia     Arthritis 89    Asthma 06    CHF (congestive heart failure)     Coronary artery disease     GERD (gastroesophageal reflux disease) 89    Headache 89    HL (hearing loss) 89    Hyperlipidemia 06    Myocardial infarction 2009    Perthes disease     Sleep apnea     Stroke 2009    Thyroid disease     Urinary tract infection 2020    Visual impairment 89     Past Surgical History:   Procedure Laterality Date    CHOLECYSTECTOMY  2001    COLONOSCOPY  2000    GALLBLADDER SURGERY      HERNIA REPAIR  2001    HIP BIPOLAR REPLACEMENT  2019    JOINT REPLACEMENT  2019      Family History   Problem Relation Age of Onset    Arthritis Mother     Rheumatologic disease Mother     Diabetes Mother      Hypertension Mother     Obesity Mother     Asthma Mother     Diabetes Father     Cancer Father         PANCREATIC    Sleep apnea Father     Hypersomnolence Father     Asthma Father     Arthritis Father     Cancer Paternal Aunt     Hypertension Maternal Grandmother     Heart disease Maternal Grandfather     Diabetes Maternal Grandfather     Thyroid disease Paternal Grandmother     Heart disease Paternal Grandfather     Stroke Paternal Grandfather       Social History     Socioeconomic History    Marital status:    Tobacco Use    Smoking status: Former     Current packs/day: 0.00     Average packs/day: 1 pack/day for 23.0 years (23.0 ttl pk-yrs)     Types: Cigarettes     Start date: 1992     Quit date: 2015     Years since quitting: 10.4     Passive exposure: Past    Smokeless tobacco: Former     Types: Chew   Vaping Use    Vaping status: Never Used   Substance and Sexual Activity    Alcohol use: Yes     Comment: occas    Drug use: No    Sexual activity: Defer      Allergies   Allergen Reactions    Morphine Mental Status Change, Unknown - High Severity, Other (See Comments) and Unknown (See Comments)     combative      Current Outpatient Medications on File Prior to Visit   Medication Sig Dispense Refill    levothyroxine (Synthroid) 150 MCG tablet Take 1 tablet by mouth Every Morning. 90 tablet 1    ondansetron (ZOFRAN) 4 MG tablet TAKE 1 TABLET BY MOUTH EVERY 8 HOURS AS NEEDED FOR NAUSEA OR VOMITING 30 tablet 1    pantoprazole (PROTONIX) 40 MG EC tablet TAKE 1 TABLET BY MOUTH DAILY 30 tablet 5    rizatriptan (Maxalt) 10 MG tablet Take 1 tablet by mouth 1 (One) Time As Needed for Migraine. May repeat in 2 hours if needed 12 tablet 5    sucralfate (CARAFATE) 1 g tablet TAKE 1 TABLET BY MOUTH FOUR TIMES DAILY 120 tablet 0    predniSONE (DELTASONE) 20 MG tablet Take 2 tablets by mouth Daily. 2 daily (Patient not taking: Reported on 6/24/2025) 10 tablet 0    rosuvastatin (CRESTOR) 10 MG tablet TAKE 1 TABLET BY  MOUTH DAILY (Patient not taking: Reported on 6/24/2025) 30 tablet 5    tiotropium bromide monohydrate (Spiriva Respimat) 2.5 MCG/ACT aerosol solution inhaler Inhale 2 puffs Daily. (Patient not taking: Reported on 6/24/2025) 4 g 0    triamcinolone (KENALOG) 0.5 % cream Apply 1 application  topically to the appropriate area as directed 3 (Three) Times a Day. (Patient not taking: Reported on 6/24/2025) 60 g 0     No current facility-administered medications on file prior to visit.      Review of Systems   Constitutional:  Positive for fatigue, unexpected weight gain and unexpected weight loss.   HENT:  Positive for trouble swallowing and voice change.    Eyes:  Positive for blurred vision and visual disturbance.   Cardiovascular:  Positive for palpitations.   Endocrine: Positive for heat intolerance.   Genitourinary:  Positive for decreased libido and erectile dysfunction.   Skin:  Positive for dry skin.        Hair loss/thinning.    Neurological:  Positive for tremors.   Psychiatric/Behavioral:  Positive for agitation and sleep disturbance. The patient is nervous/anxious.    All other systems reviewed and are negative.    /81 (BP Location: Right arm, Patient Position: Sitting, Cuff Size: Adult)   Pulse 78   Wt 97.5 kg (215 lb)   SpO2 97%   BMI 31.75 kg/m²      Physical Exam  Vitals reviewed.   Constitutional:       Appearance: Normal appearance.   Neck:      Thyroid: Thyroid mass, thyromegaly and thyroid tenderness present.      Comments: Palpable mass in the right lobe.   Cardiovascular:      Rate and Rhythm: Normal rate.   Pulmonary:      Effort: Pulmonary effort is normal.   Feet:      Right foot:      Skin integrity: Skin integrity normal.      Left foot:      Skin integrity: Skin integrity normal.   Skin:     Findings: No abrasion or wound.   Neurological:      General: No focal deficit present.      Mental Status: He is alert and oriented to person, place, and time.   Psychiatric:         Mood and  "Affect: Mood normal.         Behavior: Behavior normal.         Thought Content: Thought content normal.         Judgment: Judgment normal.       Labs/Imaging  CMP  Lab Results   Component Value Date    GLUCOSE 99 05/02/2025    BUN 18 05/02/2025    CREATININE 1.13 05/02/2025    EGFRIFNONA 80 08/12/2021    BCR 15.9 05/02/2025    K 4.6 05/02/2025    CO2 23.6 05/02/2025    CALCIUM 9.2 05/02/2025    ALBUMIN 4.6 05/02/2025    AST 32 05/02/2025    ALT 25 05/02/2025        CBC w/DIFF   Lab Results   Component Value Date    WBC 7.63 05/02/2025    RBC 5.19 05/02/2025    HGB 14.8 05/02/2025    HCT 45.8 05/02/2025    MCV 88.2 05/02/2025    MCH 28.5 05/02/2025    MCHC 32.3 05/02/2025    RDW 14.2 05/02/2025    RDWSD 45.3 05/02/2025    MPV 9.0 05/02/2025     05/02/2025    NEUTRORELPCT 50.1 05/02/2025    LYMPHORELPCT 36.0 05/02/2025    MONORELPCT 8.7 05/02/2025    EOSRELPCT 3.5 05/02/2025    BASORELPCT 0.9 05/02/2025    AUTOIGPER 0.8 (H) 05/02/2025    NEUTROABS 3.82 05/02/2025    LYMPHSABS 2.75 05/02/2025    MONOSABS 0.66 05/02/2025    EOSABS 0.27 05/02/2025    BASOSABS 0.07 05/02/2025    AUTOIGNUM 0.06 (H) 05/02/2025    NRBC 0.0 05/02/2025       TSH  Lab Results   Component Value Date    TSH 15.000 (H) 05/02/2025    TSH 1.410 06/02/2022    TSH 2.890 08/12/2021       T4  Lab Results   Component Value Date    FREET4 1.28 08/12/2021    FREET4 1.1 01/25/2018     No results found for: \"M2ZKMIY\"    T3  Lab Results   Component Value Date    T3FREE 4.14 08/12/2021     No results found for: \"C8MAMYJ\"    TRAb  No results found for: \"TSURCPAB\"    TPO  No results found for: \"THYROIDAB\"    No valid procedures specified.    Assessment and Plan    Diagnoses and all orders for this visit:    1. Hypothyroidism due to Hashimoto thyroiditis (Primary)  Assessment & Plan:  - Clinically hypothyroid.  -TFTs today with medication adjustment as indicated.  -Reminded of proper administration including taking 7 pills, once daily, per week on an " empty stomach with no missed doses, waiting 30-60 minutes prior to other medications or food.  -Discussed adding on T3.  Will do this based on lab results from today.   -Follow up in 3 months.    Orders:  -     TSH  -     T4, free  -     T3, Free  -     Thyroid Antibodies  -     Thyroid Stimulating Immunoglobulin  -     Thyrotropin Receptor Antibody  -     CT Chest With & Without Contrast    2. Dysphagia, unspecified type  Assessment & Plan:  With patient's increased symptoms, will obtain CT Chest to determine if thyroid gland is behind the sternal bone.  Will treat and follow based upon results.      Orders:  -     CT Chest With & Without Contrast    3. Erectile dysfunction, unspecified erectile dysfunction type  Assessment & Plan:  With all of his underlying conditions, it is possible that he might have an underlying condition involving the pituitary gland.  Will obtain labs to determine if this is cause and will treat and follow based on labs.     Orders:  -     FSH & LH  -     Prolactin  -     Estradiol  -     PSA DIAGNOSTIC  -     Testosterone, Free / Tot Equilib         Return in about 3 months (around 9/24/2025) for Follow-up appointment. The patient was instructed to contact the clinic with any interval questions or concerns.      This document has been electronically signed by EMMETT Holley  June 24, 2025 11:31 EDT   Endocrinology    Please note that portions of this document were completed with a voice recognition program. Efforts were made to edit the dictations, but occasionally words are mis-transcribed.

## 2025-06-24 NOTE — ASSESSMENT & PLAN NOTE
With all of his underlying conditions, it is possible that he might have an underlying condition involving the pituitary gland.  Will obtain labs to determine if this is cause and will treat and follow based on labs.

## 2025-06-25 ENCOUNTER — RESULTS FOLLOW-UP (OUTPATIENT)
Dept: ENDOCRINOLOGY | Facility: CLINIC | Age: 45
End: 2025-06-25
Payer: COMMERCIAL

## 2025-06-25 LAB
THYROGLOB AB SERPL-ACNC: 29.3 IU/ML (ref 0–0.9)
THYROPEROXIDASE AB SERPL-ACNC: 58 IU/ML (ref 0–34)
TSH RECEP AB SER-ACNC: <1.1 IU/L (ref 0–1.75)

## 2025-06-26 ENCOUNTER — TELEPHONE (OUTPATIENT)
Dept: FAMILY MEDICINE CLINIC | Facility: CLINIC | Age: 45
End: 2025-06-26

## 2025-06-26 LAB — TSI SER-ACNC: <0.1 IU/L (ref 0–0.55)

## 2025-06-26 NOTE — TELEPHONE ENCOUNTER
The referral has been faxed we are just waiting on an apt. Will call once this is scheduled.  Left message for the patient to give us a call back.   Hub to read/relay!

## 2025-06-26 NOTE — TELEPHONE ENCOUNTER
Caller: Nick Christian    Relationship: Self    Best call back number: [phone]    What specialty or service is being requested:     CARDIOLOGIST    Any additional details:     PLEASE CALL PATIENT WITH APPOINTMENT DETAILS

## 2025-06-26 NOTE — TELEPHONE ENCOUNTER
Name: Anahi Nick    Relationship: Self    Best Callback Number: [phone]     HUB PROVIDED THE RELAY MESSAGE FROM THE OFFICE   PATIENT VOICED UNDERSTANDING AND HAS NO FURTHER QUESTIONS AT THIS TIME

## 2025-06-27 ENCOUNTER — TELEPHONE (OUTPATIENT)
Dept: FAMILY MEDICINE CLINIC | Facility: CLINIC | Age: 45
End: 2025-06-27
Payer: COMMERCIAL

## 2025-06-27 NOTE — TELEPHONE ENCOUNTER
HUB OK TO RELAY AND SCHEDULE:    We received results from [facility] and need to discuss treatment plan with patient. We need to schedule an appt with him asap.    I called and left a vm to return call to schedule a followup appointment.

## 2025-06-29 LAB
TESTOST FREE MFR SERPL: 3.5 % (ref 1.5–4.2)
TESTOST FREE SERPL-MCNC: 13.2 NG/DL (ref 5–21)
TESTOST SERPL-MCNC: 377 NG/DL (ref 264–916)

## 2025-06-30 ENCOUNTER — OFFICE VISIT (OUTPATIENT)
Dept: FAMILY MEDICINE CLINIC | Facility: CLINIC | Age: 45
End: 2025-06-30
Payer: COMMERCIAL

## 2025-06-30 VITALS
WEIGHT: 221.2 LBS | SYSTOLIC BLOOD PRESSURE: 128 MMHG | DIASTOLIC BLOOD PRESSURE: 86 MMHG | HEART RATE: 99 BPM | HEIGHT: 69 IN | TEMPERATURE: 98 F | OXYGEN SATURATION: 96 % | BODY MASS INDEX: 32.76 KG/M2

## 2025-06-30 DIAGNOSIS — E78.2 MIXED HYPERLIPIDEMIA: Primary | Chronic | ICD-10-CM

## 2025-06-30 DIAGNOSIS — G43.109 MIGRAINE WITH AURA AND WITHOUT STATUS MIGRAINOSUS, NOT INTRACTABLE: Chronic | ICD-10-CM

## 2025-06-30 DIAGNOSIS — E06.3 HYPOTHYROIDISM DUE TO HASHIMOTO THYROIDITIS: Chronic | ICD-10-CM

## 2025-06-30 PROCEDURE — 1160F RVW MEDS BY RX/DR IN RCRD: CPT | Performed by: PHYSICIAN ASSISTANT

## 2025-06-30 PROCEDURE — 1125F AMNT PAIN NOTED PAIN PRSNT: CPT | Performed by: PHYSICIAN ASSISTANT

## 2025-06-30 PROCEDURE — 1159F MED LIST DOCD IN RCRD: CPT | Performed by: PHYSICIAN ASSISTANT

## 2025-06-30 PROCEDURE — 99214 OFFICE O/P EST MOD 30 MIN: CPT | Performed by: PHYSICIAN ASSISTANT

## 2025-06-30 NOTE — PROGRESS NOTES
"Chief Complaint -hyperlipidemia    History of Present Illness -     Nick Christian is a 45 y.o. male.     Hyperlipidemia-  Stable with dietary modification and rosuvastatin    Hypothyroidism-  Stable with Synthroid 150 mcg daily.  We discussed his recent lab work.  He has established care with endocrinology.    6/3/2025 ultrasound thyroid  IMPRESSION:  1. Heterogeneous hypoechoic and somewhat small size of thyroid gland  compatible with relatively later states changes of thyroiditis.  2. Lobulated appearance of the gland with areas of heterogeneity but no  discrete nodule or mass identified. No biopsy performed.  3. Reactive appearing right jugular chain lymph nodes. No adenopathy.  Clinical follow-up recommended.  4. Consider follow-up thyroid ultrasound in 6-12 months.    5/29/2025 ultrasound thyroid   IMPRESSION:    1.8 x 1.4 right lobe of thyroid solid nodule.    Migraine disorder-  Stable with rizatriptan for breakthrough migraine      The following portions of the patient's history were reviewed and updated as appropriate: allergies, current medications, past family history, past medical history, past social history, past surgical history and problem list.        Objective  Vital signs:  /86   Pulse 99   Temp 98 °F (36.7 °C) (Temporal)   Ht 175.3 cm (69\")   Wt 100 kg (221 lb 3.2 oz)   SpO2 96%   BMI 32.67 kg/m²     Physical Exam  Vitals and nursing note reviewed.   Constitutional:       Appearance: Normal appearance. He is well-developed.   Eyes:      Extraocular Movements: Extraocular movements intact.      Conjunctiva/sclera: Conjunctivae normal.   Cardiovascular:      Rate and Rhythm: Normal rate and regular rhythm.      Heart sounds: Normal heart sounds. No murmur heard.  Pulmonary:      Effort: Pulmonary effort is normal. No respiratory distress.      Breath sounds: Normal breath sounds. No wheezing.   Musculoskeletal:         General: No tenderness.   Skin:     General: Skin is warm and dry.    "   Findings: No rash.   Neurological:      Mental Status: He is alert and oriented to person, place, and time.   Psychiatric:         Mood and Affect: Mood normal.         Behavior: Behavior normal.         Thought Content: Thought content normal.         The following data was reviewed by Tracee Santos PA-C:         Lab Results   Component Value Date    BUN 18 05/02/2025    CREATININE 1.13 05/02/2025    EGFR 81.2 05/02/2025    ALT 25 05/02/2025    AST 32 05/02/2025    WBC 7.63 05/02/2025    HGB 14.8 05/02/2025    HCT 45.8 05/02/2025     05/02/2025    CHOL 138 05/02/2025    TRIG 189 (H) 05/02/2025    HDL 33 (L) 05/02/2025    LDL 73 05/02/2025    TSH 0.474 06/24/2025    HGBA1C 6.30 (H) 05/02/2025           Assessment & Plan     Diagnoses and all orders for this visit:    1. Mixed hyperlipidemia (Primary)  Comments:  Advised a low-cholesterol diet  Continue rosuvastatin    2. Hypothyroidism due to Hashimoto thyroiditis  Comments:  Discussed the ultrasounds of his thyroid and lab work  Advised to follow with endocrinology    3. Migraine with aura and without status migrainosus, not intractable  Comments:  Continue rizatriptan as needed  Advised to avoid known migraine triggers when possible  Orders:  -     rizatriptan (Maxalt) 10 MG tablet; Take 1 tablet by mouth 1 (One) Time As Needed for Migraine. May repeat in 2 hours if needed    4. Migraine with aura and without status migrainosus, not intractable  Comments:  Start Maxalt for breakthrough migraines  Orders:  -     rizatriptan (Maxalt) 10 MG tablet; Take 1 tablet by mouth 1 (One) Time As Needed for Migraine. May repeat in 2 hours if needed                   Patient was given instructions and counseling regarding his condition or for health maintenance advice. Please see specific information pulled into the AVS if appropriate      This document has been electronically signed by:  Tracee Santos PA-C

## 2025-07-01 PROBLEM — G43.C0 PERIODIC HEADACHE SYNDROME, NOT INTRACTABLE: Status: RESOLVED | Noted: 2020-08-18 | Resolved: 2025-07-01

## 2025-07-01 PROBLEM — E03.9 HYPOTHYROIDISM: Status: ACTIVE | Noted: 2025-07-01

## 2025-07-01 PROBLEM — J06.9 ACUTE UPPER RESPIRATORY INFECTION, UNSPECIFIED: Status: RESOLVED | Noted: 2023-08-07 | Resolved: 2025-07-01

## 2025-07-01 PROBLEM — Z20.822 EXPOSURE TO COVID-19 VIRUS: Status: RESOLVED | Noted: 2021-01-14 | Resolved: 2025-07-01

## 2025-07-01 PROBLEM — Z00.00 HEALTHCARE MAINTENANCE: Status: RESOLVED | Noted: 2019-02-01 | Resolved: 2025-07-01

## 2025-07-01 RX ORDER — RIZATRIPTAN BENZOATE 10 MG/1
10 TABLET ORAL ONCE AS NEEDED
Start: 2025-07-01

## 2025-07-01 NOTE — PATIENT INSTRUCTIONS
"Fat and Cholesterol Restricted Eating Plan  Getting too much fat and cholesterol in your diet may cause health problems. Choosing the right foods helps keep your fat and cholesterol at normal levels. This can keep you from getting certain diseases.  Your doctor may recommend an eating plan that includes:  Total fat: ______% or less of total calories a day. This is ______g of fat a day.  Saturated fat: ______% or less of total calories a day. This is ______g of saturated fat a day.  Cholesterol: less than _________mg a day.  Fiber: ______g a day.  What are tips for following this plan?  General tips  Work with your doctor to lose weight if you need to.  Avoid:  Foods with added sugar.  Fried foods.  Foods with trans fat or partially hydrogenated oils. This includes some margarines and baked goods.  If you drink alcohol:  Limit how much you have to:  0-1 drink a day for women who are not pregnant.  0-2 drinks a day for men.  Know how much alcohol is in a drink. In the [place] one drink equals one 12 oz bottle of beer (355 mL), one 5 oz glass of wine (148 mL), or one 1½ oz glass of hard liquor (44 mL).  Reading food labels  Check food labels for:  Trans fats.  Partially hydrogenated oils.  Saturated fat (g) in each serving.  Cholesterol (mg) in each serving.  Fiber (g) in each serving.  Choose foods with healthy fats, such as:  Monounsaturated fats and polyunsaturated fats. These include olive and canola oil, flaxseeds, walnuts, almonds, and seeds.  Omega-3 fats. These are found in certain fish, flaxseed oil, and ground flaxseeds.  Choose grain products that have whole grains. Look for the word \"whole\" as the first word in the ingredient list.  Cooking  Cook foods using low-fat methods. These include baking, boiling, grilling, and broiling.  Eat more home-cooked foods. Eat at restaurants and buffets less often. Eat less fast food.  Avoid cooking using saturated fats, such as butter, cream, palm oil, palm kernel oil, and " coconut oil.  Meal planning    At meals, divide your plate into four equal parts:  Fill one-half of your plate with vegetables, green salads, and fruit.  Fill one-fourth of your plate with whole grains.  Fill one-fourth of your plate with low-fat (lean) protein foods.  Eat fish that is high in omega-3 fats at least two times a week. This includes mackerel, tuna, sardines, and salmon.  Eat foods that are high in fiber, such as whole grains, beans, apples, pears, berries, broccoli, carrots, peas, and barley.  What foods should I eat?  Fruits  All fresh, canned (in natural juice), or frozen fruits.  Vegetables  Fresh or frozen vegetables (raw, steamed, roasted, or grilled). Green salads.  Grains  Whole grains, such as whole wheat or whole grain breads, crackers, cereals, and pasta. Unsweetened oatmeal, bulgur, barley, quinoa, or brown rice. Corn or whole wheat flour tortillas.  Meats and other protein foods  Ground beef (85% or leaner), grass-fed beef, or beef trimmed of fat. Skinless chicken or turkey. Ground chicken or turkey. Pork trimmed of fat. All fish and seafood. Egg whites. Dried beans, peas, or lentils. Unsalted nuts or seeds. Unsalted canned beans. Nut butters without added sugar or oil.  Dairy  Low-fat or nonfat dairy products, such as skim or 1% milk, 2% or reduced-fat cheeses, low-fat and fat-free ricotta or cottage cheese, or plain low-fat and nonfat yogurt.  Fats and oils  Tub margarine without trans fats. Light or reduced-fat mayonnaise and salad dressings. Avocado. Olive, canola, sesame, or safflower oils.  The items listed above may not be a complete list of foods and beverages you can eat. Contact a dietitian for more information.  What foods should I avoid?  Fruits  Canned fruit in heavy syrup. Fruit in cream or butter sauce. Fried fruit.  Vegetables  Vegetables cooked in cheese, cream, or butter sauce. Fried vegetables.  Grains  White bread. White pasta. White rice. Cornbread. Bagels, pastries,  and croissants. Crackers and snack foods that contain trans fat and hydrogenated oils.  Meats and other protein foods  Fatty cuts of meat. Ribs, chicken wings, jean, sausage, bologna, salami, chitterlings, fatback, hot dogs, bratwurst, and packaged lunch meats. Liver and organ meats. Whole eggs and egg yolks. Chicken and turkey with skin. Fried meat.  Dairy  Whole or 2% milk, cream, half-and-half, and cream cheese. Whole milk cheeses. Whole-fat or sweetened yogurt. Full-fat cheeses. Nondairy creamers and whipped toppings. Processed cheese, cheese spreads, and cheese curds.  Fats and oils  Butter, stick margarine, lard, shortening, ghee, or jean fat. Coconut, palm kernel, and palm oils.  Beverages  Alcohol. Sugar-sweetened drinks such as sodas, lemonade, and fruit drinks.  Sweets and desserts  Corn syrup, sugars, honey, and molasses. Candy. Jam and jelly. Syrup. Sweetened cereals. Cookies, pies, cakes, donuts, muffins, and ice cream.  The items listed above may not be a complete list of foods and beverages you should avoid. Contact a dietitian for more information.  Summary  Choosing the right foods helps keep your fat and cholesterol at normal levels. This can keep you from getting certain diseases.  At meals, fill one-half of your plate with vegetables, green salads, and fruits.  Eat high fiber foods, like whole grains, beans, apples, pears, berries, carrots, peas, and barley.  Limit added sugar, saturated fats, alcohol, and fried foods.  This information is not intended to replace advice given to you by your health care provider. Make sure you discuss any questions you have with your health care provider.  Document Revised: 04/29/2022 Document Reviewed: 04/29/2022  Elsevier Patient Education © 2024 [employer]

## 2025-07-17 ENCOUNTER — OFFICE VISIT (OUTPATIENT)
Dept: FAMILY MEDICINE CLINIC | Facility: CLINIC | Age: 45
End: 2025-07-17
Payer: COMMERCIAL

## 2025-07-17 VITALS
OXYGEN SATURATION: 97 % | SYSTOLIC BLOOD PRESSURE: 134 MMHG | WEIGHT: 224 LBS | BODY MASS INDEX: 33.18 KG/M2 | HEIGHT: 69 IN | TEMPERATURE: 98 F | DIASTOLIC BLOOD PRESSURE: 88 MMHG | HEART RATE: 97 BPM

## 2025-07-17 DIAGNOSIS — E78.2 MIXED HYPERLIPIDEMIA: Chronic | ICD-10-CM

## 2025-07-17 DIAGNOSIS — Q07.00 ARNOLD-CHIARI MALFORMATION: Chronic | ICD-10-CM

## 2025-07-17 DIAGNOSIS — G25.81 RLS (RESTLESS LEGS SYNDROME): Primary | Chronic | ICD-10-CM

## 2025-07-17 PROCEDURE — 99214 OFFICE O/P EST MOD 30 MIN: CPT | Performed by: PHYSICIAN ASSISTANT

## 2025-07-17 PROCEDURE — 1125F AMNT PAIN NOTED PAIN PRSNT: CPT | Performed by: PHYSICIAN ASSISTANT

## 2025-07-17 PROCEDURE — 1159F MED LIST DOCD IN RCRD: CPT | Performed by: PHYSICIAN ASSISTANT

## 2025-07-17 PROCEDURE — 1160F RVW MEDS BY RX/DR IN RCRD: CPT | Performed by: PHYSICIAN ASSISTANT

## 2025-07-17 RX ORDER — ROPINIROLE 0.25 MG/1
TABLET, FILM COATED ORAL
Qty: 120 TABLET | Refills: 0 | Status: SHIPPED | OUTPATIENT
Start: 2025-07-17

## 2025-07-18 PROBLEM — G25.81 RLS (RESTLESS LEGS SYNDROME): Chronic | Status: ACTIVE | Noted: 2025-07-18

## 2025-07-18 NOTE — PROGRESS NOTES
"Chief Complaint -sleep disturbance    History of Present Illness -     Nick Christian is a 45 y.o. male.     Sleep disturbance-  Patient complains of fatigue stating that he does not sleep well.  He underwent an inpatient sleep study that did not show sleep apnea but did show some involuntary movements that could disturb his sleep.    Arnold-Chiari malformation-  Patient has had this malformation for years and we discussed the possible significance of this malformation and that it can have some symptoms similar to sleep apnea.  He has failed Mirapex in the past for the involuntary movements in his legs.    Hyperlipidemia-  Stable with rosuvastatin and low-cholesterol diet      The following portions of the patient's history were reviewed and updated as appropriate: allergies, current medications, past family history, past medical history, past social history, past surgical history and problem list.        Objective  Vital signs:  /88   Pulse 97   Temp 98 °F (36.7 °C) (Temporal)   Ht 175.3 cm (69\")   Wt 102 kg (224 lb)   SpO2 97%   BMI 33.08 kg/m²     Physical Exam  Vitals and nursing note reviewed.   Constitutional:       Appearance: Normal appearance. He is well-developed.   Eyes:      Extraocular Movements: Extraocular movements intact.      Conjunctiva/sclera: Conjunctivae normal.   Cardiovascular:      Rate and Rhythm: Normal rate and regular rhythm.      Heart sounds: Normal heart sounds. No murmur heard.  Pulmonary:      Effort: Pulmonary effort is normal. No respiratory distress.      Breath sounds: Normal breath sounds. No wheezing.   Musculoskeletal:         General: No tenderness.   Skin:     General: Skin is warm and dry.      Findings: No rash.   Neurological:      Mental Status: He is alert and oriented to person, place, and time.   Psychiatric:         Mood and Affect: Mood normal.         Behavior: Behavior normal.         Thought Content: Thought content normal.         The following data " was reviewed by Tracee Santos PA-C:    Data reviewed :   Sleep study    Lab Results   Component Value Date    BUN 18 05/02/2025    CREATININE 1.13 05/02/2025    EGFR 81.2 05/02/2025    ALT 25 05/02/2025    AST 32 05/02/2025    WBC 7.63 05/02/2025    HGB 14.8 05/02/2025    HCT 45.8 05/02/2025     05/02/2025    CHOL 138 05/02/2025    TRIG 189 (H) 05/02/2025    HDL 33 (L) 05/02/2025    LDL 73 05/02/2025    TSH 0.474 06/24/2025    HGBA1C 6.30 (H) 05/02/2025           Assessment & Plan     Diagnoses and all orders for this visit:    1. RLS (restless legs syndrome) (Primary)  Comments:  Start Requip  Failed Mirapex  May be related to the Arnold-Chiari but should help the patient to have better quality and quantity of sleep  Orders:  -     rOPINIRole (REQUIP) 0.25 MG tablet; Take 1 tab qhs x 2 days then 2 tab qhs x 2 days then 4 tabs qhs therafter  Dispense: 120 tablet; Refill: 0    2. Arnold-Chiari malformation  Comments:  Continue to monitor    3. Mixed hyperlipidemia  Comments:  Continue rosuvastatin  Advised low-cholesterol diet                   Patient was given instructions and counseling regarding his condition or for health maintenance advice. Please see specific information pulled into the AVS if appropriate      This document has been electronically signed by:  Tracee Santos PA-C

## 2025-08-15 DIAGNOSIS — K21.00 GASTROESOPHAGEAL REFLUX DISEASE WITH ESOPHAGITIS WITHOUT HEMORRHAGE: Chronic | ICD-10-CM

## 2025-08-16 DIAGNOSIS — G25.81 RLS (RESTLESS LEGS SYNDROME): Chronic | ICD-10-CM

## 2025-08-18 DIAGNOSIS — G43.109 MIGRAINE WITH AURA AND WITHOUT STATUS MIGRAINOSUS, NOT INTRACTABLE: Primary | ICD-10-CM

## 2025-08-18 RX ORDER — ROPINIROLE 0.25 MG/1
TABLET, FILM COATED ORAL
Qty: 120 TABLET | Refills: 0 | Status: SHIPPED | OUTPATIENT
Start: 2025-08-18

## 2025-08-18 RX ORDER — ONDANSETRON 4 MG/1
4 TABLET, FILM COATED ORAL EVERY 8 HOURS PRN
Qty: 30 TABLET | Refills: 1 | Status: SHIPPED | OUTPATIENT
Start: 2025-08-18